# Patient Record
Sex: MALE | Race: WHITE | NOT HISPANIC OR LATINO | Employment: FULL TIME | URBAN - METROPOLITAN AREA
[De-identification: names, ages, dates, MRNs, and addresses within clinical notes are randomized per-mention and may not be internally consistent; named-entity substitution may affect disease eponyms.]

---

## 2018-03-08 ENCOUNTER — TELEPHONE (OUTPATIENT)
Dept: UROLOGY | Facility: MEDICAL CENTER | Age: 43
End: 2018-03-08

## 2018-03-08 NOTE — TELEPHONE ENCOUNTER
Phone call from Jazlyn Mensah @ [address] [facility]  Patient has a new patient appointment at their office at 1:30PM today for Gross hematuria  Past patient of dr Yamileth Lake  I advised Jazlyn Mensah, signed release needed  She will fax the release to my direct fax# as soon as patient arrives  Once Release is received I will fax the records to their direct Fax# [phone]

## 2018-03-09 NOTE — TELEPHONE ENCOUNTER
SIGNED RECORDS REQUEST RECEIVED   RECORDS FAXED TO [facility] [phone]

## 2019-08-19 ENCOUNTER — HOSPITAL ENCOUNTER (EMERGENCY)
Facility: HOSPITAL | Age: 44
Discharge: HOME | End: 2019-08-19
Attending: EMERGENCY MEDICINE | Admitting: EMERGENCY MEDICINE
Payer: COMMERCIAL

## 2019-08-19 VITALS
TEMPERATURE: 97.8 F | RESPIRATION RATE: 20 BRPM | BODY MASS INDEX: 35.87 KG/M2 | DIASTOLIC BLOOD PRESSURE: 91 MMHG | HEART RATE: 68 BPM | WEIGHT: 310 LBS | OXYGEN SATURATION: 98 % | HEIGHT: 78 IN | SYSTOLIC BLOOD PRESSURE: 136 MMHG

## 2019-08-19 DIAGNOSIS — S61.216A LACERATION OF RIGHT LITTLE FINGER WITHOUT FOREIGN BODY WITHOUT DAMAGE TO NAIL, INITIAL ENCOUNTER: Primary | ICD-10-CM

## 2019-08-19 PROCEDURE — 90715 TDAP VACCINE 7 YRS/> IM: CPT

## 2019-08-19 PROCEDURE — 99282 EMERGENCY DEPT VISIT SF MDM: CPT

## 2019-08-19 PROCEDURE — 90471 IMMUNIZATION ADMIN: CPT

## 2019-08-19 RX ORDER — LIDOCAINE HYDROCHLORIDE 20 MG/ML
5 INJECTION, SOLUTION EPIDURAL; INFILTRATION; INTRACAUDAL; PERINEURAL ONCE
Status: COMPLETED | OUTPATIENT
Start: 2019-08-19 | End: 2019-08-19

## 2019-08-19 RX ORDER — PANTOPRAZOLE SODIUM 20 MG/1
20 TABLET, DELAYED RELEASE ORAL DAILY
COMMUNITY

## 2019-08-19 RX ORDER — GINSENG 100 MG
1 CAPSULE ORAL ONCE
Status: COMPLETED | OUTPATIENT
Start: 2019-08-19 | End: 2019-08-19

## 2019-08-19 RX ADMIN — BACITRACIN ZINC 1 SMALL APPLICATION: 500 OINTMENT TOPICAL at 19:15

## 2019-08-19 RX ADMIN — LIDOCAINE HYDROCHLORIDE 5 ML: 20 INJECTION, SOLUTION EPIDURAL; INFILTRATION; INTRACAUDAL; PERINEURAL at 18:20

## 2019-08-19 RX ADMIN — TETANUS TOXOID, REDUCED DIPHTHERIA TOXOID AND ACELLULAR PERTUSSIS VACCINE, ADSORBED 0.5 ML: 5; 2.5; 8; 8; 2.5 SUSPENSION INTRAMUSCULAR at 18:17

## 2019-08-19 NOTE — ED PROVIDER NOTES
History  Chief Complaint   Patient presents with    Laceration     right 5th finger on glass     Patient broke a coffee mug which caused a laceration to his right little finger  Patient states that bled extensively, pain is minimal   Last Tdap is unknown  Patient denies other problems  History provided by:  Patient  Finger Laceration   Location:  Finger  Finger laceration location:  R little finger  Length:  2  Depth: Through dermis  Quality: avulsion    Bleeding: controlled    Laceration mechanism:  Broken glass  Pain details:     Severity:  Mild  Relieved by:  Nothing  Worsened by:  Nothing  Ineffective treatments:  None tried  Tetanus status:  Unknown  Associated symptoms: no fever, no focal weakness, no numbness, no rash, no redness and no swelling        Prior to Admission Medications   Prescriptions Last Dose Informant Patient Reported? Taking? pantoprazole (PROTONIX) 20 mg tablet   Yes Yes   Sig: Take 20 mg by mouth daily      Facility-Administered Medications: None       Past Medical History:   Diagnosis Date    Testicular cancer [facility])        Past Surgical History:   Procedure Laterality Date    HERNIA REPAIR      LYMPHADENECTOMY      SURGERY SCROTAL / TESTICULAR         History reviewed  No pertinent family history  I have reviewed and agree with the history as documented  Social History     Tobacco Use    Smoking status: Never Smoker    Smokeless tobacco: Never Used   Substance Use Topics    Alcohol use: Not Currently    Drug use: Never        Review of Systems   Constitutional: Negative for chills and fever  HENT: Negative for congestion, rhinorrhea and sore throat  Eyes: Negative for visual disturbance  Respiratory: Negative for cough and shortness of breath  Cardiovascular: Negative for chest pain  Gastrointestinal: Negative for abdominal pain, diarrhea, nausea and vomiting  Genitourinary: Negative for difficulty urinating  Skin: Negative for rash     Neurological: Negative for focal weakness and headaches  Physical Exam  Physical Exam   Constitutional: He is oriented to person, place, and time  He appears well-developed and well-nourished  HENT:   Head: Normocephalic  Right Ear: External ear normal    Left Ear: External ear normal    Nose: Nose normal    Mouth/Throat: Oropharynx is clear and moist    Eyes: Pupils are equal, round, and reactive to light  Conjunctivae and EOM are normal    Neck: Normal range of motion  Neck supple  Cardiovascular: Normal rate, regular rhythm, normal heart sounds and intact distal pulses  Pulmonary/Chest: Effort normal and breath sounds normal    Musculoskeletal: Normal range of motion  Neurological: He is alert and oriented to person, place, and time  Skin: Skin is warm and dry  Capillary refill takes less than 2 seconds  2 cm flap laceration over right little finger PIP joint dorsally   Psychiatric: He has a normal mood and affect  His behavior is normal  Thought content normal    Nursing note and vitals reviewed        Vital Signs  ED Triage Vitals [08/19/19 1728]   Temperature Pulse Respirations Blood Pressure SpO2   97 8 °F (36 6 °C) 82 16 142/83 97 %      Temp Source Heart Rate Source Patient Position - Orthostatic VS BP Location FiO2 (%)   Temporal Monitor Sitting Left arm --      Pain Score       6           Vitals:    08/19/19 1728   BP: 142/83   Pulse: 82   Patient Position - Orthostatic VS: Sitting         Visual Acuity      ED Medications  Medications   bacitracin topical ointment 1 small application (has no administration in time range)   tetanus-diphtheria-acellular pertussis (BOOSTRIX) IM injection 0 5 mL (0 5 mL Intramuscular Given 8/19/19 1817)   lidocaine (PF) (XYLOCAINE-MPF) 2 % injection 5 mL (5 mL Infiltration Given 8/19/19 1820)       Diagnostic Studies  Results Reviewed     None                 No orders to display              Procedures  Laceration repair  Date/Time: 8/19/2019 6:59 PM  Performed by: Renata Moreau PA-C  Authorized by: Renata Moreau PA-C   Consent: Verbal consent obtained  Risks and benefits: risks, benefits and alternatives were discussed  Consent given by: patient  Patient understanding: patient states understanding of the procedure being performed  Radiology Images displayed and confirmed  If images not available, report reviewed: imaging studies available  Patient identity confirmed: verbally with patient  Body area: upper extremity  Location details: right small finger  Laceration length: 3 cm  Tendon involvement: none  Nerve involvement: none  Vascular damage: yes (arteriole was lacerated, distal circulation intact)  Anesthesia: digital block    Anesthesia:  Local Anesthetic: lidocaine 2% without epinephrine  Anesthetic total: 5 mL    Wound Dehiscence:  Superficial Wound Dehiscence: simple closure      Procedure Details:  Preparation: Patient was prepped and draped in the usual sterile fashion    Irrigation solution: saline  Irrigation method: syringe  Amount of cleaning: standard  Skin closure: 5-0 nylon and Ethilon  Number of sutures: 6  Technique: simple  Approximation: close  Approximation difficulty: simple  Dressing: 4x4 sterile gauze, antibiotic ointment and splint  Patient tolerance: Patient tolerated the procedure well with no immediate complications             ED Course                               MDM    Disposition  Final diagnoses:   Laceration of right little finger without foreign body without damage to nail, initial encounter     Time reflects when diagnosis was documented in both MDM as applicable and the Disposition within this note     Time User Action Codes Description Comment    8/19/2019  7:01 PM Hayes Esters Add [Y25 361Y] Laceration of right little finger without foreign body without damage to nail, initial encounter       ED Disposition     ED Disposition Condition Date/Time Comment    Discharge Stable Mon Aug 19, 2019  7:01 PM Ute Pineda discharge to home/self care  Follow-up Information     Follow up With Specialties Details Why Contact Info    Brandy Brewster DO Family Medicine Schedule an appointment as soon as possible for a visit in 10 days For suture removal [address]  [phone]            Patient's Medications   Discharge Prescriptions    No medications on file     No discharge procedures on file      ED Provider  Electronically Signed by           Patrick Thompson PA-C  08/19/19 3924

## 2020-08-04 ENCOUNTER — HOSPITAL ENCOUNTER (INPATIENT)
Facility: HOSPITAL | Age: 45
LOS: 1 days | Discharge: HOME | DRG: 558 | End: 2020-08-06
Attending: EMERGENCY MEDICINE | Admitting: HOSPITALIST
Payer: COMMERCIAL

## 2020-08-04 ENCOUNTER — APPOINTMENT (EMERGENCY)
Dept: RADIOLOGY | Facility: HOSPITAL | Age: 45
DRG: 558 | End: 2020-08-04
Payer: COMMERCIAL

## 2020-08-04 DIAGNOSIS — M70.20 OLECRANON BURSITIS: Primary | ICD-10-CM

## 2020-08-04 DIAGNOSIS — M71.10 SEPTIC BURSITIS: ICD-10-CM

## 2020-08-04 DIAGNOSIS — M70.22 OLECRANON BURSITIS OF LEFT ELBOW: ICD-10-CM

## 2020-08-04 PROBLEM — E78.2 MIXED HYPERLIPIDEMIA: Status: ACTIVE | Noted: 2020-08-04

## 2020-08-04 PROBLEM — K21.9 GASTROESOPHAGEAL REFLUX DISEASE WITHOUT ESOPHAGITIS: Status: ACTIVE | Noted: 2020-08-04

## 2020-08-04 PROBLEM — I10 ESSENTIAL HYPERTENSION: Status: ACTIVE | Noted: 2020-08-04

## 2020-08-04 LAB
ALBUMIN SERPL BCP-MCNC: 4.1 G/DL (ref 3.5–5.7)
ALP SERPL-CCNC: 43 U/L (ref 40–150)
ALT SERPL W P-5'-P-CCNC: 16 U/L (ref 7–52)
ANION GAP SERPL CALCULATED.3IONS-SCNC: 7 MMOL/L (ref 4–13)
AST SERPL W P-5'-P-CCNC: 15 U/L (ref 13–39)
BASOPHILS # BLD AUTO: 0 THOUSANDS/ΜL (ref 0–0.1)
BASOPHILS NFR BLD AUTO: 0 % (ref 0–2)
BILIRUB SERPL-MCNC: 0.7 MG/DL (ref 0.2–1)
BUN SERPL-MCNC: 17 MG/DL (ref 7–25)
CALCIUM SERPL-MCNC: 9 MG/DL (ref 8.6–10.5)
CHLORIDE SERPL-SCNC: 100 MMOL/L (ref 98–107)
CO2 SERPL-SCNC: 26 MMOL/L (ref 21–31)
CREAT SERPL-MCNC: 0.87 MG/DL (ref 0.7–1.3)
CRP SERPL QL: 28.3 MG/L
EOSINOPHIL # BLD AUTO: 0 THOUSAND/ΜL (ref 0–0.61)
EOSINOPHIL NFR BLD AUTO: 0 % (ref 0–5)
ERYTHROCYTE [DISTWIDTH] IN BLOOD BY AUTOMATED COUNT: 12.7 % (ref 11.5–14.5)
GFR SERPL CREATININE-BSD FRML MDRD: 104 ML/MIN/1.73SQ M
GLUCOSE SERPL-MCNC: 114 MG/DL (ref 65–99)
HCT VFR BLD AUTO: 36 % (ref 42–47)
HGB BLD-MCNC: 12.5 G/DL (ref 14–18)
LACTATE SERPL-SCNC: 0.8 MMOL/L (ref 0.5–2)
LYMPHOCYTES # BLD AUTO: 1.4 THOUSANDS/ΜL (ref 0.6–4.47)
LYMPHOCYTES NFR BLD AUTO: 10 % (ref 21–51)
MCH RBC QN AUTO: 29.2 PG (ref 26–34)
MCHC RBC AUTO-ENTMCNC: 34.6 G/DL (ref 31–37)
MCV RBC AUTO: 85 FL (ref 81–99)
MONOCYTES # BLD AUTO: 0.9 THOUSAND/ΜL (ref 0.17–1.22)
MONOCYTES NFR BLD AUTO: 7 % (ref 2–12)
NEUTROPHILS # BLD AUTO: 11 THOUSANDS/ΜL (ref 1.4–6.5)
NEUTS SEG NFR BLD AUTO: 82 % (ref 42–75)
PLATELET # BLD AUTO: 202 THOUSANDS/UL (ref 149–390)
PMV BLD AUTO: 7.9 FL (ref 8.6–11.7)
POTASSIUM SERPL-SCNC: 3.5 MMOL/L (ref 3.5–5.5)
PROT SERPL-MCNC: 6.6 G/DL (ref 6.4–8.9)
RBC # BLD AUTO: 4.26 MILLION/UL (ref 4.3–5.9)
SODIUM SERPL-SCNC: 133 MMOL/L (ref 134–143)
WBC # BLD AUTO: 13.3 THOUSAND/UL (ref 4.8–10.8)

## 2020-08-04 PROCEDURE — 99220 PR INITIAL OBSERVATION CARE/DAY 70 MINUTES: CPT | Performed by: NURSE PRACTITIONER

## 2020-08-04 PROCEDURE — 80053 COMPREHEN METABOLIC PANEL: CPT | Performed by: EMERGENCY MEDICINE

## 2020-08-04 PROCEDURE — 83605 ASSAY OF LACTIC ACID: CPT | Performed by: EMERGENCY MEDICINE

## 2020-08-04 PROCEDURE — 85025 COMPLETE CBC W/AUTO DIFF WBC: CPT | Performed by: EMERGENCY MEDICINE

## 2020-08-04 PROCEDURE — 96365 THER/PROPH/DIAG IV INF INIT: CPT

## 2020-08-04 PROCEDURE — 99284 EMERGENCY DEPT VISIT MOD MDM: CPT

## 2020-08-04 PROCEDURE — 86140 C-REACTIVE PROTEIN: CPT | Performed by: EMERGENCY MEDICINE

## 2020-08-04 PROCEDURE — 87040 BLOOD CULTURE FOR BACTERIA: CPT | Performed by: EMERGENCY MEDICINE

## 2020-08-04 PROCEDURE — 96361 HYDRATE IV INFUSION ADD-ON: CPT

## 2020-08-04 PROCEDURE — 99285 EMERGENCY DEPT VISIT HI MDM: CPT | Performed by: EMERGENCY MEDICINE

## 2020-08-04 PROCEDURE — 36415 COLL VENOUS BLD VENIPUNCTURE: CPT | Performed by: EMERGENCY MEDICINE

## 2020-08-04 PROCEDURE — 73080 X-RAY EXAM OF ELBOW: CPT

## 2020-08-04 RX ORDER — LISINOPRIL 10 MG/1
40 TABLET ORAL
COMMUNITY

## 2020-08-04 RX ORDER — ACETAMINOPHEN 325 MG/1
650 TABLET ORAL ONCE
Status: COMPLETED | OUTPATIENT
Start: 2020-08-04 | End: 2020-08-04

## 2020-08-04 RX ORDER — ATORVASTATIN CALCIUM 80 MG/1
TABLET, FILM COATED ORAL
COMMUNITY

## 2020-08-04 RX ORDER — ALBUTEROL SULFATE 90 UG/1
2 AEROSOL, METERED RESPIRATORY (INHALATION) EVERY 6 HOURS PRN
COMMUNITY
Start: 2019-11-08 | End: 2020-11-07

## 2020-08-04 RX ADMIN — VANCOMYCIN HYDROCHLORIDE 2000 MG: 1 INJECTION, POWDER, LYOPHILIZED, FOR SOLUTION INTRAVENOUS at 22:58

## 2020-08-04 RX ADMIN — SODIUM CHLORIDE 1000 ML: 0.9 INJECTION, SOLUTION INTRAVENOUS at 21:07

## 2020-08-04 RX ADMIN — ACETAMINOPHEN 650 MG: 325 TABLET ORAL at 21:07

## 2020-08-05 PROBLEM — K21.9 GASTROESOPHAGEAL REFLUX DISEASE WITHOUT ESOPHAGITIS: Status: RESOLVED | Noted: 2020-08-04 | Resolved: 2020-08-05

## 2020-08-05 LAB
ANION GAP SERPL CALCULATED.3IONS-SCNC: 9 MMOL/L (ref 4–13)
BASOPHILS # BLD AUTO: 0 THOUSANDS/ΜL (ref 0–0.1)
BASOPHILS NFR BLD AUTO: 0 % (ref 0–2)
BUN SERPL-MCNC: 15 MG/DL (ref 7–25)
CALCIUM SERPL-MCNC: 8.5 MG/DL (ref 8.6–10.5)
CHLORIDE SERPL-SCNC: 103 MMOL/L (ref 98–107)
CO2 SERPL-SCNC: 25 MMOL/L (ref 21–31)
CREAT SERPL-MCNC: 0.74 MG/DL (ref 0.7–1.3)
EOSINOPHIL # BLD AUTO: 0 THOUSAND/ΜL (ref 0–0.61)
EOSINOPHIL NFR BLD AUTO: 0 % (ref 0–5)
ERYTHROCYTE [DISTWIDTH] IN BLOOD BY AUTOMATED COUNT: 13 % (ref 11.5–14.5)
GFR SERPL CREATININE-BSD FRML MDRD: 111 ML/MIN/1.73SQ M
GLUCOSE P FAST SERPL-MCNC: 100 MG/DL (ref 65–99)
GLUCOSE SERPL-MCNC: 100 MG/DL (ref 65–99)
HCT VFR BLD AUTO: 35.5 % (ref 42–47)
HGB BLD-MCNC: 12.5 G/DL (ref 14–18)
LYMPHOCYTES # BLD AUTO: 1.1 THOUSANDS/ΜL (ref 0.6–4.47)
LYMPHOCYTES NFR BLD AUTO: 8 % (ref 21–51)
MCH RBC QN AUTO: 29.7 PG (ref 26–34)
MCHC RBC AUTO-ENTMCNC: 35.2 G/DL (ref 31–37)
MCV RBC AUTO: 84 FL (ref 81–99)
MONOCYTES # BLD AUTO: 1.1 THOUSAND/ΜL (ref 0.17–1.22)
MONOCYTES NFR BLD AUTO: 9 % (ref 2–12)
NEUTROPHILS # BLD AUTO: 10.7 THOUSANDS/ΜL (ref 1.4–6.5)
NEUTS SEG NFR BLD AUTO: 83 % (ref 42–75)
PLATELET # BLD AUTO: 185 THOUSANDS/UL (ref 149–390)
PLATELET # BLD AUTO: 187 THOUSANDS/UL (ref 149–390)
PMV BLD AUTO: 8.4 FL (ref 8.6–11.7)
POTASSIUM SERPL-SCNC: 3.3 MMOL/L (ref 3.5–5.5)
PROCALCITONIN SERPL-MCNC: <0.05 NG/ML
RBC # BLD AUTO: 4.21 MILLION/UL (ref 4.3–5.9)
SODIUM SERPL-SCNC: 137 MMOL/L (ref 134–143)
VANCOMYCIN TROUGH SERPL-MCNC: 11 UG/ML (ref 5–12)
WBC # BLD AUTO: 12.9 THOUSAND/UL (ref 4.8–10.8)

## 2020-08-05 PROCEDURE — 80048 BASIC METABOLIC PNL TOTAL CA: CPT | Performed by: NURSE PRACTITIONER

## 2020-08-05 PROCEDURE — 99204 OFFICE O/P NEW MOD 45 MIN: CPT | Performed by: ORTHOPAEDIC SURGERY

## 2020-08-05 PROCEDURE — 99226 PR SBSQ OBSERVATION CARE/DAY 35 MINUTES: CPT | Performed by: HOSPITALIST

## 2020-08-05 PROCEDURE — 80202 ASSAY OF VANCOMYCIN: CPT | Performed by: NURSE PRACTITIONER

## 2020-08-05 PROCEDURE — 85025 COMPLETE CBC W/AUTO DIFF WBC: CPT | Performed by: NURSE PRACTITIONER

## 2020-08-05 PROCEDURE — 85049 AUTOMATED PLATELET COUNT: CPT | Performed by: NURSE PRACTITIONER

## 2020-08-05 PROCEDURE — 84145 PROCALCITONIN (PCT): CPT | Performed by: HOSPITALIST

## 2020-08-05 RX ORDER — SODIUM CHLORIDE 9 MG/ML
100 INJECTION, SOLUTION INTRAVENOUS CONTINUOUS
Status: DISCONTINUED | OUTPATIENT
Start: 2020-08-05 | End: 2020-08-06

## 2020-08-05 RX ORDER — MELOXICAM 7.5 MG/1
15 TABLET ORAL DAILY
Status: DISCONTINUED | OUTPATIENT
Start: 2020-08-05 | End: 2020-08-06 | Stop reason: HOSPADM

## 2020-08-05 RX ORDER — CEFEPIME HYDROCHLORIDE 2 G/50ML
2000 INJECTION, SOLUTION INTRAVENOUS EVERY 12 HOURS
Status: DISCONTINUED | OUTPATIENT
Start: 2020-08-05 | End: 2020-08-06

## 2020-08-05 RX ORDER — LISINOPRIL 5 MG/1
10 TABLET ORAL DAILY
Status: DISCONTINUED | OUTPATIENT
Start: 2020-08-05 | End: 2020-08-06 | Stop reason: HOSPADM

## 2020-08-05 RX ORDER — VANCOMYCIN HYDROCHLORIDE 1 G/200ML
1000 INJECTION, SOLUTION INTRAVENOUS EVERY 12 HOURS
Status: DISCONTINUED | OUTPATIENT
Start: 2020-08-05 | End: 2020-08-05 | Stop reason: DRUGHIGH

## 2020-08-05 RX ORDER — CEFEPIME HYDROCHLORIDE 2 G/1
2000 INJECTION, POWDER, FOR SOLUTION INTRAVENOUS EVERY 12 HOURS
Status: DISCONTINUED | OUTPATIENT
Start: 2020-08-05 | End: 2020-08-05

## 2020-08-05 RX ORDER — HYDROMORPHONE HCL 110MG/55ML
2 PATIENT CONTROLLED ANALGESIA SYRINGE INTRAVENOUS ONCE
Status: COMPLETED | OUTPATIENT
Start: 2020-08-05 | End: 2020-08-05

## 2020-08-05 RX ORDER — ONDANSETRON 2 MG/ML
4 INJECTION INTRAMUSCULAR; INTRAVENOUS EVERY 6 HOURS PRN
Status: DISCONTINUED | OUTPATIENT
Start: 2020-08-05 | End: 2020-08-06 | Stop reason: HOSPADM

## 2020-08-05 RX ORDER — ACETAMINOPHEN 325 MG/1
650 TABLET ORAL EVERY 6 HOURS PRN
Status: DISCONTINUED | OUTPATIENT
Start: 2020-08-05 | End: 2020-08-06 | Stop reason: HOSPADM

## 2020-08-05 RX ORDER — FAMOTIDINE 20 MG/1
40 TABLET, FILM COATED ORAL 2 TIMES DAILY
Status: DISCONTINUED | OUTPATIENT
Start: 2020-08-05 | End: 2020-08-05

## 2020-08-05 RX ORDER — ATORVASTATIN CALCIUM 80 MG/1
80 TABLET, FILM COATED ORAL
Status: DISCONTINUED | OUTPATIENT
Start: 2020-08-05 | End: 2020-08-06 | Stop reason: HOSPADM

## 2020-08-05 RX ORDER — ALBUTEROL SULFATE 90 UG/1
2 AEROSOL, METERED RESPIRATORY (INHALATION) EVERY 6 HOURS PRN
Status: DISCONTINUED | OUTPATIENT
Start: 2020-08-05 | End: 2020-08-06 | Stop reason: HOSPADM

## 2020-08-05 RX ADMIN — MORPHINE SULFATE 2 MG: 2 INJECTION, SOLUTION INTRAMUSCULAR; INTRAVENOUS at 13:44

## 2020-08-05 RX ADMIN — MORPHINE SULFATE 2 MG: 2 INJECTION, SOLUTION INTRAMUSCULAR; INTRAVENOUS at 09:45

## 2020-08-05 RX ADMIN — LISINOPRIL 10 MG: 5 TABLET ORAL at 09:50

## 2020-08-05 RX ADMIN — HYDROMORPHONE HYDROCHLORIDE 2 MG: 2 INJECTION, SOLUTION INTRAMUSCULAR; INTRAVENOUS; SUBCUTANEOUS at 04:50

## 2020-08-05 RX ADMIN — ATORVASTATIN CALCIUM 80 MG: 80 TABLET, FILM COATED ORAL at 15:44

## 2020-08-05 RX ADMIN — VANCOMYCIN HYDROCHLORIDE 1750 MG: 1 INJECTION, POWDER, LYOPHILIZED, FOR SOLUTION INTRAVENOUS at 15:43

## 2020-08-05 RX ADMIN — VANCOMYCIN HYDROCHLORIDE 1750 MG: 1 INJECTION, POWDER, LYOPHILIZED, FOR SOLUTION INTRAVENOUS at 09:53

## 2020-08-05 RX ADMIN — CEFEPIME HYDROCHLORIDE 2000 MG: 2 INJECTION, SOLUTION INTRAVENOUS at 11:55

## 2020-08-05 RX ADMIN — MELOXICAM 15 MG: 7.5 TABLET ORAL at 15:43

## 2020-08-05 RX ADMIN — SODIUM CHLORIDE 100 ML/HR: 0.9 INJECTION, SOLUTION INTRAVENOUS at 02:00

## 2020-08-05 RX ADMIN — CEFEPIME HYDROCHLORIDE 2000 MG: 2 INJECTION, SOLUTION INTRAVENOUS at 22:03

## 2020-08-05 RX ADMIN — MORPHINE SULFATE 2 MG: 2 INJECTION, SOLUTION INTRAMUSCULAR; INTRAVENOUS at 01:57

## 2020-08-05 RX ADMIN — SODIUM CHLORIDE 100 ML/HR: 0.9 INJECTION, SOLUTION INTRAVENOUS at 11:54

## 2020-08-05 RX ADMIN — ENOXAPARIN SODIUM 40 MG: 40 INJECTION SUBCUTANEOUS at 09:50

## 2020-08-05 RX ADMIN — MORPHINE SULFATE 2 MG: 2 INJECTION, SOLUTION INTRAMUSCULAR; INTRAVENOUS at 19:44

## 2020-08-05 NOTE — ASSESSMENT & PLAN NOTE
· Patient continues to complain of excruciating pain in his left elbow, patient cannot flex or extend his elbow - patient is requiring IV narcotics for pain control  · Await formal orthopedic input  · C reactive protein is elevated  · Will check a procalcitonin now, and again in the a m  · Continue vancomycin, will add cefepime  · I will be transitioning this patient from the observation status to the inpatient status in view of the continued need of IV antibiotics, IV narcotics, and an orthopedic evaluation    I do not anticipate that he will be discharged prior to the initial suspected less than 2 midnights  · Will repeat blood work for the morning

## 2020-08-05 NOTE — PROGRESS NOTES
Vancomycin Assessment    Gavino Bob is a 38 y o  male who is currently receiving vancomycin 2000mg IV once, then 1000mg IV Q12H for other bone/joint infection   Relevant clinical data and objective history reviewed:  Creatinine   Date Value Ref Range Status   08/04/2020 0 87 0 70 - 1 30 mg/dL Final     Comment:     Standardized to IDMS reference method     /83 (BP Location: Right arm)   Pulse 80   Temp 98 7 °F (37 1 °C) (Temporal)   Resp 20   Ht 6' 9"   Wt (!) 138 kg (305 lb 0 1 oz)   SpO2 99%   BMI 32 68 kg/m²   No intake/output data recorded  Lab Results   Component Value Date/Time    BUN 17 08/04/2020 09:06 PM    WBC 13 30 (H) 08/04/2020 09:06 PM    HGB 12 5 (L) 08/04/2020 09:06 PM    HCT 36 0 (L) 08/04/2020 09:06 PM    MCV 85 08/04/2020 09:06 PM     08/04/2020 09:06 PM     Temp Readings from Last 3 Encounters:   08/05/20 98 7 °F (37 1 °C) (Temporal)   08/19/19 97 8 °F (36 6 °C) (Temporal)     Vancomycin Days of Therapy: 1    Assessment/Plan  The patient is currently on vancomycin utilizing scheduled dosing based on adjusted body weight (due to obesity)  Baseline risks associated with therapy include: concomitant nephrotoxic medications  The patient is currently receiving 2000mg IV once, then 1000mg IV Q12H and after clinical evaluation will be changed to 2000mg IV once, then 1750mg IV Q8H  Pharmacy will also follow closely for s/sx of nephrotoxicity, infusion reactions, and appropriateness of therapy  BMP and CBC will be ordered per protocol  Plan for trough as patient approaches steady state, prior to the 4th  dose at approximately 2230 on 8/5/20  Due to infection severity, will target a trough of 15-20 (appropriate for most indications)   Pharmacy will continue to follow the patients culture results and clinical progress daily      Tonya Collet, Pharmacist

## 2020-08-05 NOTE — NURSING NOTE
Patient awake  Alert and oriented, pleasant and cooperative with care  Dr Méndez Credit in to assess patient  Moist heat reapplied to left elbow as Dr Méndez Credit requests  Left arm elevated on 2 pillows  Neurovascular status stable

## 2020-08-05 NOTE — QUICK NOTE
Patient did receive morphine 2 mg IV at 1:57 a m  For left elbow pain  RN notified provider at this time, of continued pain  Will order patient 1 time dose of 2 mg IV Dilaudid, continue to monitor patient

## 2020-08-05 NOTE — CONSULTS
Consultation - Orthopedics   Johana Lobato 38 y o  male MRN: [id number]  Unit/Bed#: -01 Encounter: [id number]      Assessment/Plan     Assessment:  Cellulitis left upper extremity slowly resolving  Plan:  Continue intravenous antibiotics for an additional 24-48 hours  Patient states redness is starting to decrease  Would recommend warm moist compresses  Will add anti-inflammatory medication   to be evaluated tomorrow    History of Present Illness   Physician Requesting Consult: Mayra Yates MD  Reason for Consult / Principal Problem:  Cellulitis left upper extremity  HPI: Johana Lobato is a 38y o  year old male who presents with left elbow pain for the past day  He works as a   He denies any specific trauma  He denies any numbness or tingling  He a questionable for chills last night  He noticed erythema along his elbow and forearm region  He did come to the emergency room where he was evaluated and subsequently admitted  He states he still some pain, albeit he states the redness is starting to decrease  Inpatient consult to Orthopedic Surgery  Consult performed by: Ruchi Mayorga DO  Consult ordered by: ALEX Akhtar          Review of Systems   Constitutional: Negative for chills, fever and unexpected weight change  HENT: Negative for hearing loss, nosebleeds and sore throat  Eyes: Negative for pain, redness and visual disturbance  Respiratory: Negative for cough, shortness of breath and wheezing  Cardiovascular: Negative for chest pain, palpitations and leg swelling  Gastrointestinal: Negative for abdominal pain, nausea and vomiting  Endocrine: Negative for polydipsia and polyuria  Genitourinary: Negative for dysuria and hematuria  Musculoskeletal: Positive for arthralgias, joint swelling and myalgias  Negative for back pain, gait problem, neck pain and neck stiffness  As noted in HPI   Skin: Negative for rash and wound     Neurological: Negative for dizziness, numbness and headaches  Psychiatric/Behavioral: Negative for decreased concentration and suicidal ideas  The patient is not nervous/anxious             Historical Information   Past Medical History:   Diagnosis Date    Asthma     Hypertension     Testicular cancer ([address] )      Past Surgical History:   Procedure Laterality Date    HERNIA REPAIR      LYMPHADENECTOMY      ROTATOR CUFF REPAIR      SURGERY SCROTAL / TESTICULAR       Social History   Social History     Substance and Sexual Activity   Alcohol Use Yes    Frequency: Monthly or less    Drinks per session: 1 or 2    Binge frequency: Never    Comment: few beers a month     Social History     Substance and Sexual Activity   Drug Use Never     E-Cigarette/Vaping    E-Cigarette Use Never User      E-Cigarette/Vaping Substances    Nicotine No     THC No     CBD No     Flavoring No     Other No     Unknown No      Social History     Tobacco Use   Smoking Status Former Smoker    Packs/day: 0 50    Years: 1 00    Pack years: 0 50    Types: Cigarettes    Start date: 1992    Last attempt to quit: 10/5/1993    Years since quittin 8   Smokeless Tobacco Never Used     Family History:   Family History   Problem Relation Age of Onset    Cancer Mother     Hypertension Father     Thyroid disease Sister     No Known Problems Daughter        Meds/Allergies   all current active meds have been reviewed  Allergies   Allergen Reactions    Ibuprofen Other (See Comments) and GI Bleeding     Other reaction(s): severe chest pain  800mg  800mg  Other reaction(s): Free Text  800mg    Cat Hair Extract Wheezing    Grass Extracts [Gramineae Pollens] Headache     Eyes water    Lidocaine Hives     rash    Pollen Extract Sneezing    Bee Pollen Rash    Dust Mite Extract Sneezing    Mold Extract [Trichophyton] Sneezing       Objective   Vitals: Blood pressure 137/72, pulse 96, temperature 99 2 °F (37 3 °C), temperature source Tympanic, resp  rate 20, height 6' 9" (2 057 m), weight (!) 138 kg (305 lb 0 1 oz), SpO2 96 %  ,Body mass index is 32 68 kg/m²  Intake/Output Summary (Last 24 hours) at 8/5/2020 1222  Last data filed at 8/5/2020 0953  Gross per 24 hour   Intake 360 ml   Output    Net 360 ml     I/O last 24 hours: In: 360 [P O :360]  Out: -     Invasive Devices     Peripheral Intravenous Line            Peripheral IV 08/04/20 Right Antecubital less than 1 day                Physical Exam  Ortho Exam     Physical Exam   Constitutional: He is oriented to person, place, and time  He appears well-developed and well-nourished  No distress  HENT:   Head: Normocephalic  Eyes: Conjunctivae are normal  Right eye exhibits no discharge  Left eye exhibits no discharge  No scleral icterus  Cardiovascular: Normal rate  Pulmonary/Chest: Effort normal    Neurological: He is alert and oriented to person, place, and time  Skin: Skin is warm and dry  No rash noted  He is not diaphoretic  No pallor  Psychiatric: He has a normal mood and affect  His behavior is normal  Judgment and thought content normal      Skeletal was the left upper extremity is neurovascular intact  Fingers are pink and mobile  Compartments are soft  There is erythema along the posterior aspect of the elbow  There is no fullness or fluid  The skin appears to be cellulitic in nature  There is no open wounds drainage pus or foul smell  There is decreased range of motion along the elbow secondary to pain but functionally intact    No red streaks present    Lab Results:   CBC:   Lab Results   Component Value Date    WBC 12 90 (H) 08/05/2020    HGB 12 5 (L) 08/05/2020    HCT 35 5 (L) 08/05/2020    MCV 84 08/05/2020     08/05/2020    MCH 29 7 08/05/2020    MCHC 35 2 08/05/2020    RDW 13 0 08/05/2020    MPV 8 4 (L) 08/05/2020     CMP:   Lab Results   Component Value Date    SODIUM 137 08/05/2020     08/05/2020    CO2 25 08/05/2020    BUN 15 08/05/2020 CREATININE 0 74 08/05/2020    CALCIUM 8 5 (L) 08/05/2020    AST 15 08/04/2020    ALT 16 08/04/2020    ALKPHOS 43 08/04/2020    EGFR 111 08/05/2020     PT/INR: No results found for: PT, INR  ESR: No results found for: ESR  Imaging Studies: I have personally reviewed pertinent films in PACS     X-rays personally reviewed displaced no fractures or dislocations  There is soft tissue swelling posteriorly    EKG, Pathology, and Other Studies: I have personally reviewed pertinent reports  VTE Prophylaxis: Sequential compression device (Venodyne)     Code Status: Level 1 - Full Code  Advance Directive and Living Will:      Power of :    POLST:      Counseling / Coordination of Care  Total floor / unit time spent today 35 minutes  Greater than 50% of total time was spent with the patient and / or family counseling and / or coordination of care   A description of the counseling / coordination of care:

## 2020-08-05 NOTE — PLAN OF CARE
Problem: INFECTION - ADULT  Goal: Absence or prevention of progression during hospitalization  Description: INTERVENTIONS:  - Assess and monitor for signs and symptoms of infection  - Monitor lab/diagnostic results  - [place] appropriate cooling/warming therapies per order  - Administer medications as ordered  - Instruct and encourage patient and family to use good hand hygiene technique  - Identify and instruct in appropriate isolation precautions for identified infection/condition  Outcome: Progressing     Problem: DISCHARGE PLANNING  Goal: Discharge to home or other facility with appropriate resources  Description: INTERVENTIONS:  - Identify barriers to discharge w/patient and caregiver  - Arrange for needed discharge resources and transportation as appropriate  - Identify discharge learning needs (meds, wound care, etc )  - Refer to Case Management Department for coordinating discharge planning if the patient needs post-hospital services based on physician/advanced practitioner order or complex needs related to functional status, cognitive ability, or social support system  Outcome: Progressing     Problem: SKIN/TISSUE INTEGRITY - ADULT  Goal: Skin integrity remains intact  Description: INTERVENTIONS  - Assess and monitor skin integrity  - Collaborate with interdisciplinary team   - Patient/family teaching  Outcome: Progressing

## 2020-08-05 NOTE — UTILIZATION REVIEW
Notification of Observation Admission/Observation Authorization Request   This is a Notification of Observation Admission for [facility]  Be advised that this patient was admitted to our facility under Observation Status  Contact Harriet Farrar at [phone] for additional admission information  [facility]  DEDICATED [phone]  Patient Name:   Prosper Pulido   YOB: 1975       [address]   [address]:   [address]  Tax ID: [id number]  NPI: [id number] Attending Provider/NPI:  Phone:  Address: Aileen Mora [[id number]]  [phone]  Same as Facility   Place of Service Code: 25     Place of Service Name:  CPT Code for Observation:  On [facility] / CPT 14025   Start Date:  Discharge Date & Time: No discharge date for patient encounter  Type of Admission: Observation Status Discharge Disposition (if discharged): Home/Self Care   Patient Diagnoses: Olecranon bursitis [M70 20]  Elbow swelling [M25 429]  Septic bursitis [M71 10]  Olecranon bursitis of left elbow [M70 22]   Orders: Admission Orders (From admission, onward)     Ordered        08/04/20 2319  Place in Observation (expected length of stay for this patient is less than two midnights)  Once                    Assigned Utilization Review Contact: Ronnie Barrier Review Department  Phone: [phone]; Fax [phone]  Email: [email or web address]   ATTENTION PAYERS: Please call the assigned Utilization  directly with any questions or concerns ALL voicemails in the department are confidential  Send all requests for admission clinical reviews, approved or denied determinations and any other requests to dedicated fax number belonging to the campus where the patient is receiving treatment

## 2020-08-05 NOTE — PROGRESS NOTES
Progress Note - Alvina Gramajo 1975, 38 y o  male MRN: [id number]    Unit/Bed#: -01 Encounter: [id number]    Primary Care Provider: Christofer Cali DO   Date and time admitted to hospital: 2020  8:39 PM        * Olecranon bursitis of left elbow  Assessment & Plan  · Patient continues to complain of excruciating pain in his left elbow, patient cannot flex or extend his elbow - patient is requiring IV narcotics for pain control  · Await formal orthopedic input  · C reactive protein is elevated  · Will check a procalcitonin now, and again in the a m  · Continue vancomycin, will add cefepime  · If, after the orthopedic evaluation, further inpatient workup is needed, I will transition this patient from the observation status to the inpatient status  · Will repeat blood work for the morning    Essential hypertension  Assessment & Plan  · Continue lisinopril    Mixed hyperlipidemia  Assessment & Plan  · Continue Lipitor        VTE Prophylaxis:  Enoxaparin (Lovenox)    Patient Centered Rounds: I have performed bedside rounds with nursing staff today  Discussions with Specialists or Other Care Team Provider:  Orthopedic surgery, case management, nursing, pharmacy  Education and Discussions with Family / Patient:  Patient was brought up to par with the plan of care for today, all questions answered to his satisfaction    Current Length of Stay: 0 day(s)    Current Patient Status: Observation   Certification Statement: The patient will continue to require additional inpatient hospital stay due to The impending orthopedic evaluation    Discharge Plan:  Discharge planning will commence after the patient has been cleared from an orthopedic standpoint    Code Status: Level 1 - Full Code    Subjective:   Patient seen and examined, patient continues to complain of a 10/10 left elbow pain  The pain is prohibiting him from flexing and or extending his elbow      Objective:     Vitals:   Temp (24hrs), Av 6 °F (37 6 °C), Min:98 7 °F (37 1 °C), Max:100 6 °F (38 1 °C)    Temp:  [98 7 °F (37 1 °C)-100 6 °F (38 1 °C)] 99 2 °F (37 3 °C)  HR:  [80-96] 96  Resp:  [18-20] 20  BP: (131-138)/(72-83) 137/72  SpO2:  [96 %-99 %] 96 %  Body mass index is 32 68 kg/m²  Input and Output Summary (last 24 hours):     No intake or output data in the 24 hours ending 08/05/20 1000    Physical Exam:     Physical Exam   Constitutional: He is oriented to person, place, and time  He appears well-developed  HENT:   Head: Normocephalic and atraumatic  Nose: Nose normal    Eyes: Pupils are equal, round, and reactive to light  Conjunctivae are normal    Neck: Normal range of motion  Neck supple  No JVD present  No thyromegaly present  Cardiovascular: Normal rate and regular rhythm  Exam reveals no gallop and no friction rub  No murmur heard  Pulmonary/Chest: Effort normal and breath sounds normal  No respiratory distress  Abdominal: Soft  Bowel sounds are normal  He exhibits no distension and no mass  There is no abdominal tenderness  There is no guarding  Musculoskeletal: Normal range of motion  Comments: Left elbow is extremely swollen, tender to touch, flexion and extension are not possible   Lymphadenopathy:     He has no cervical adenopathy  Neurological: He is alert and oriented to person, place, and time  No cranial nerve deficit  Skin: Skin is warm  No rash noted  No erythema  Psychiatric: His behavior is normal    Vitals reviewed        Additional Data:     Labs:    Results from last 7 days   Lab Units 08/05/20  0448   WBC Thousand/uL 12 90*   HEMOGLOBIN g/dL 12 5*   HEMATOCRIT % 35 5*   PLATELETS Thousands/uL 187   NEUTROS PCT % 83*   LYMPHS PCT % 8*   MONOS PCT % 9   EOS PCT % 0     Results from last 7 days   Lab Units 08/05/20  0448 08/04/20  2106   SODIUM mmol/L 137 133*   POTASSIUM mmol/L 3 3* 3 5   CHLORIDE mmol/L 103 100   CO2 mmol/L 25 26   BUN mg/dL 15 17   CREATININE mg/dL 0 74 0 87   CALCIUM mg/dL 8 5* 9  0   ALK PHOS U/L  --  43   ALT U/L  --  16   AST U/L  --  15                   * I Have Reviewed All Lab Data Listed Above  * Additional Pertinent Lab Tests Reviewed: Johnna 66 Admission  Reviewed    Imaging:  Imaging Reports Reviewed Today Include:  X-ray elbow-left olecranon bursitis    Recent Cultures (last 7 days):           Last 24 Hours Medication List:   acetaminophen, 650 mg, Oral, Q6H PRN, Radhika A Meckes, CRNP  albuterol, 2 puff, Inhalation, Q6H PRN, Radhika A Meckes, CRNP  atorvastatin, 80 mg, Oral, Daily With Dinner, Radhika A Meckes, CRNP  enoxaparin, 40 mg, Subcutaneous, Daily, Radhika A Meckes, CRNP  lisinopril, 10 mg, Oral, Daily, Radhika A Meckes, CRNP  morphine injection, 2 mg, Intravenous, Q4H PRN, Radhika A Meckes, CRNP  ondansetron, 4 mg, Intravenous, Q6H PRN, Radhika A Meckes, CRNP  sodium chloride, 100 mL/hr, Intravenous, Continuous, Radhika A Meckes, CRNP, Last Rate: 100 mL/hr (08/05/20 0200)  vancomycin, 15 mg/kg (Adjusted), Intravenous, Q8H, Radhika A Meckes, CRNP, Last Rate: 1,750 mg (08/05/20 0953)         Today, Patient Was Seen By: Tonia Barkley MD    ** Please Note: Dictation voice to text software may have been used in the creation of this document   **

## 2020-08-05 NOTE — ASSESSMENT & PLAN NOTE
· Sudden onset = septic  · Patient with elevated WBC, and temperature of 100 6°  · Dr Manny oBnilla made aware of orthopedic consultation by ED provider  · Approximately 24 hours of redness, increasing warmth, edema    · Continue vancomycin  · Will alternate heat and ice  · Morphine for pain control  · Continue IV fluids at 100 mL/hour

## 2020-08-05 NOTE — SOCIAL WORK
Evaluated the pt at the bedside  Pt was admitted to the hospital for olecranon bursitis of the elbow  Explained the role of CM and the options of discharge plannign with the pt  Pt lives with his wife in a 2 story home with 15 steps to the 2nd floor  Pt is independent and works  Pt PCP is Dr Kadie Gallego  Pt utilizes [employer]  Pt states his wife will transport home upon discharge  Patient/caregiver received discharge checklist   Content reviewed  Patient/caregiver encouraged to participate in discharge plan of care prior to discharge home  CM reviewed d/c planning process including the following: identifying help at home, patient preference for d/c planning needs, availability of treatment team to discuss questions or concerns patient and/or family may have regarding understanding medications and recognizing signs and symptoms once discharged  CM also encouraged patient to follow up with all recommended appointments after discharge  Patient advised of importance for patient and family to participate in managing patients medical well being

## 2020-08-05 NOTE — ED PROVIDER NOTES
History  Chief Complaint   Patient presents with    Elbow Swelling     HPI    Patient is a 38year old male who presents with left olecranon bursitis for the past day  Some overlying erythema  No vomiting or diarrhea  Currently well appearing  NAD  Notes sharp, pain in the left elbow, no radiation, worse with palpation  MDM olecranon bursitis will discuss plan with ortho  Of note, patient developed a fever while here, this coupled with the overlying erythema and chills, will admit  Prior to Admission Medications   Prescriptions Last Dose Informant Patient Reported? Taking? Cholecalciferol 1 25 MG (34645 UT) TABS 8/2/2020 at Unknown time  Yes Yes   Sig: take 1 capsule by mouth ONCE EVERY WEEK FOR 4 WEEKS THEN ONE CAPSULE ONCE EVERY MONTH   albuterol (PROVENTIL HFA,VENTOLIN HFA) 90 mcg/act inhaler Past Week at Unknown time  Yes Yes   Sig: Inhale 2 puffs every 6 (six) hours as needed   atorvastatin (LIPITOR) 80 mg tablet 8/4/2020 at Unknown time  Yes Yes   lisinopril (ZESTRIL) 10 mg tablet 8/4/2020 at Unknown time  Yes Yes   Sig: Take by mouth   pantoprazole (PROTONIX) 20 mg tablet Not Taking at Unknown time  Yes No   Sig: Take 20 mg by mouth daily      Facility-Administered Medications: None       Past Medical History:   Diagnosis Date    Asthma     Hypertension     [facility]icular cancer ([facility] [address] )        Past Surgical History:   Procedure Laterality Date    HERNIA REPAIR      LYMPHADENECTOMY      ROTATOR CUFF REPAIR      SURGERY SCROTAL / TESTICULAR         Family History   Problem Relation Age of Onset    Cancer Mother     Hypertension Father     Thyroid disease Sister     No Known Problems Daughter      I have reviewed and agree with the history as documented      E-Cigarette/Vaping    E-Cigarette Use Never User      E-Cigarette/Vaping Substances    Nicotine No     THC No     CBD No     Flavoring No     Other No     Unknown No      Social History     Tobacco Use    Smoking status: Former Smoker     Packs/day: 0 50     Years: 1 00     Pack years: 0 50     Types: Cigarettes     Start date: 1992     Last attempt to quit: 10/5/1993     Years since quittin 8    Smokeless tobacco: Never Used   Substance Use Topics    Alcohol use: Yes     Frequency: Monthly or less     Drinks per session: 1 or 2     Binge frequency: Never     Comment: few beers a month    Drug use: Never       Review of Systems   Musculoskeletal: Positive for arthralgias and joint swelling  All other systems reviewed and are negative  Physical Exam  Physical Exam  Vitals signs and nursing note reviewed  Constitutional:       Appearance: He is well-developed  He is not diaphoretic  HENT:      Head: Normocephalic and atraumatic  Eyes:      Pupils: Pupils are equal, round, and reactive to light  Neck:      Musculoskeletal: Normal range of motion and neck supple  Cardiovascular:      Rate and Rhythm: Normal rate and regular rhythm  Heart sounds: Normal heart sounds  No murmur  Pulmonary:      Effort: Pulmonary effort is normal  No respiratory distress  Breath sounds: Normal breath sounds  No wheezing  Abdominal:      General: Bowel sounds are normal  There is no distension  Palpations: Abdomen is soft  Tenderness: There is no abdominal tenderness  Musculoskeletal: Normal range of motion  General: Swelling and tenderness present  Skin:     General: Skin is warm and dry  Findings: Erythema present  Neurological:      Mental Status: He is alert and oriented to person, place, and time  Cranial Nerves: No cranial nerve deficit        Coordination: Coordination normal    Psychiatric:         Behavior: Behavior normal          Vital Signs  ED Triage Vitals [20]   Temperature Pulse Respirations Blood Pressure SpO2   99 9 °F (37 7 °C) 89 18 131/77 99 %      Temp Source Heart Rate Source Patient Position - Orthostatic VS BP Location FiO2 (%) Tympanic Monitor Sitting Left arm --      Pain Score       Worst Possible Pain           Vitals:    08/04/20 2043 08/05/20 0038 08/05/20 0656 08/05/20 1503   BP: 131/77 138/83 137/72 130/74   Pulse: 89 80 96 92   Patient Position - Orthostatic VS: Sitting Sitting Lying Lying         Visual Acuity      ED Medications  Medications   albuterol (PROVENTIL HFA,VENTOLIN HFA) inhaler 2 puff (has no administration in time range)   atorvastatin (LIPITOR) tablet 80 mg (80 mg Oral Given 8/5/20 1544)   lisinopril (ZESTRIL) tablet 10 mg (10 mg Oral Given 8/5/20 0950)   sodium chloride 0 9 % infusion (100 mL/hr Intravenous Rate/Dose Verify 8/5/20 1201)   ondansetron (ZOFRAN) injection 4 mg (has no administration in time range)   enoxaparin (LOVENOX) subcutaneous injection 40 mg (40 mg Subcutaneous Given 8/5/20 0950)   acetaminophen (TYLENOL) tablet 650 mg (has no administration in time range)   morphine injection 2 mg (2 mg Intravenous Given 8/5/20 1344)   vancomycin (VANCOCIN) 1,750 mg in sodium chloride 0 9 % 500 mL IVPB (1,750 mg Intravenous New Bag 8/5/20 1543)   cefepime (MAXIPIME) IVPB (premix) 2,000 mg 50 mL (0 mg Intravenous Stopped 8/5/20 1230)   meloxicam (MOBIC) tablet 15 mg (15 mg Oral Given 8/5/20 1543)   sodium chloride 0 9 % bolus 1,000 mL (0 mL Intravenous Stopped 8/4/20 2213)   acetaminophen (TYLENOL) tablet 650 mg (650 mg Oral Given 8/4/20 2107)   vancomycin (VANCOCIN) 2,000 mg in sodium chloride 0 9 % 500 mL IVPB (2,000 mg Intravenous New Bag 8/4/20 2258)   HYDROmorphone (DILAUDID) injection 2 mg (2 mg Intravenous Given 8/5/20 0450)       Diagnostic Studies  Results Reviewed     Procedure Component Value Units Date/Time    Blood culture #1 [[id number]] Collected:  08/04/20 2200    Lab Status:  Preliminary result Specimen:  Blood from Arm, Right Updated:  08/05/20 1101     Blood Culture Received in Microbiology Lab  Culture in Progress      Blood culture #2 [[id number]] Collected:  08/04/20 2223    Lab Status: Preliminary result Specimen:  Blood from Arm, Left Updated:  08/05/20 1101     Blood Culture Received in Microbiology Lab  Culture in Progress  C-reactive protein [[id number]]  (Abnormal) Collected:  08/04/20 2106    Lab Status:  Final result Specimen:  Blood from Arm, Right Updated:  08/04/20 2135     CRP 28 3 mg/L     Comprehensive metabolic panel [[id number]]  (Abnormal) Collected:  08/04/20 2106    Lab Status:  Final result Specimen:  Blood from Arm, Right Updated:  08/04/20 2134     Sodium 133 mmol/L      Potassium 3 5 mmol/L      Chloride 100 mmol/L      CO2 26 mmol/L      ANION GAP 7 mmol/L      BUN 17 mg/dL      Creatinine 0 87 mg/dL      Glucose 114 mg/dL      Calcium 9 0 mg/dL      AST 15 U/L      ALT 16 U/L      Alkaline Phosphatase 43 U/L      Total Protein 6 6 g/dL      Albumin 4 1 g/dL      Total Bilirubin 0 70 mg/dL      eGFR 104 ml/min/1 73sq m     Narrative:       [place] guidelines for Chronic Kidney Disease (CKD):     Stage 1 with normal or high GFR (GFR > 90 mL/min/1 73 square meters)    Stage 2 Mild CKD (GFR = 60-89 mL/min/1 73 square meters)    Stage 3A Moderate CKD (GFR = 45-59 mL/min/1 73 square meters)    Stage 3B Moderate CKD (GFR = 30-44 mL/min/1 73 square meters)    Stage 4 Severe CKD (GFR = 15-29 mL/min/1 73 square meters)    Stage 5 End Stage CKD (GFR <15 mL/min/1 73 square meters)  Note: GFR calculation is accurate only with a steady state creatinine    Lactic acid [[id number]]  (Normal) Collected:  08/04/20 2106    Lab Status:  Final result Specimen:  Blood from Arm, Right Updated:  08/04/20 2131     LACTIC ACID 0 8 mmol/L     Narrative:       Result may be elevated if tourniquet was used during collection      CBC and differential [[id number]]  (Abnormal) Collected:  08/04/20 2106    Lab Status:  Final result Specimen:  Blood from Arm, Right Updated:  08/04/20 2115     WBC 13 30 Thousand/uL      RBC 4 26 Million/uL      Hemoglobin 12 5 g/dL Hematocrit 36 0 %      MCV 85 fL      MCH 29 2 pg      MCHC 34 6 g/dL      RDW 12 7 %      MPV 7 9 fL      Platelets 267 Thousands/uL      Neutrophils Relative 82 %      Lymphocytes Relative 10 %      Monocytes Relative 7 %      Eosinophils Relative 0 %      Basophils Relative 0 %      Neutrophils Absolute 11 00 Thousands/µL      Lymphocytes Absolute 1 40 Thousands/µL      Monocytes Absolute 0 90 Thousand/µL      Eosinophils Absolute 0 00 Thousand/µL      Basophils Absolute 0 00 Thousands/µL                  XR elbow 3+ vw LEFT   Final Result by Sundeep Hansen MD (08/05 0350)      Soft tissue swelling overlying the olecranon which may be seen with olecranon bursitis  Workstation performed: [id number]                    Procedures  Procedures         ED Course       US AUDIT      Most Recent Value   Initial Alcohol Screen: US AUDIT-C    1  How often do you have a drink containing alcohol?  0 Filed at: 08/04/2020 2046   Audit-C Score  0 Filed at: 08/04/2020 2046                  HENOK/DAST-10      Most Recent Value   How many times in the past year have you    Used an illegal drug or used a prescription medication for non-medical reasons? Never Filed at: 08/04/2020 2046                                MDM      Disposition  Final diagnoses:   Olecranon bursitis   Septic bursitis     Time reflects when diagnosis was documented in both MDM as applicable and the Disposition within this note     Time User Action Codes Description Comment    8/4/2020 11:19 PM Stacey Figueredo Add [M70 20] Olecranon bursitis     8/4/2020 11:19 PM Briseyda Dubon Add [M71 10] Septic bursitis     8/4/2020 11:31 PM Dax Fofana Add [M70 22] Olecranon bursitis of left elbow       ED Disposition     ED Disposition Condition Date/Time Comment    Admit Stable Tue Aug 4, 2020 11:19 PM Case was discussed with nick ap and the patient's admission status was agreed to be Admission Status: observation status to the service of Dr Brett Rosales   Follow-up Information    None         Current Discharge Medication List      CONTINUE these medications which have NOT CHANGED    Details   albuterol (PROVENTIL HFA,VENTOLIN HFA) 90 mcg/act inhaler Inhale 2 puffs every 6 (six) hours as needed    Comments: Substitution to a formulary equivalent within the same pharmaceutical class is authorized  atorvastatin (LIPITOR) 80 mg tablet       Cholecalciferol 1 25 MG (26872 UT) TABS take 1 capsule by mouth ONCE EVERY WEEK FOR 4 WEEKS THEN ONE CAPSULE ONCE EVERY MONTH      lisinopril (ZESTRIL) 10 mg tablet Take by mouth      pantoprazole (PROTONIX) 20 mg tablet Take 20 mg by mouth daily           No discharge procedures on file      PDMP Review     None          ED Provider  Electronically Signed by           Janice Andrade MD  08/05/20 5680

## 2020-08-05 NOTE — UTILIZATION REVIEW
Initial Clinical Review  WAS OBSERVATION 8/4/20 @2319 CONVERTED TO INPATIENT ADMISSION 8/5/20 @1443 DUE TO CONTINUED STAY REQUIRED TO CARE FOR PATIENT WITH SEPTIC BURSITIS FOR WHOM ORTHOPEDIC CONSULT IS NEEDED AND IV ANTBX WILL CONTINUE   Admission: Date/Time/Statement:   Admission Orders (From admission, onward)     Ordered        08/05/20 1443  Inpatient Admission  Once         08/04/20 2319  Place in Observation (expected length of stay for this patient is less than two midnights)  Once                   Orders Placed This Encounter   Procedures    Inpatient Admission     Standing Status:   Standing     Number of Occurrences:   1     Order Specific Question:   Admitting Physician     Answer:   Donaldo Britt [[id number]]     Order Specific Question:   Level of Care     Answer:   Med Surg [16]     Order Specific Question:   Estimated length of stay     Answer:   More than 2 Midnights     Order Specific Question:   Certification     Answer:   I certify that inpatient services are medically necessary for this patient for a duration of greater than two midnights  See H&P and MD Progress Notes for additional information about the patient's course of treatment  ED Arrival Information     Expected Arrival Acuity Means of Arrival Escorted By Service Admission Type    - 8/4/2020 20:16 Urgent Walk-In Spouse General Medicine Urgent    Arrival Complaint    elbow/arm pain and swelling        Chief Complaint   Patient presents with    Elbow Swelling     Assessment/Plan: 37 yo male to ED from home admitted under observation due to septic L olecranon bursitis  Presented with 24 hrs of L elbow swelling and tenderness that started in AM  By lunchtime, increased swelling, extremely red, very warm, painful  On arrival, febrile 100 6  Exam revealed L elbow tenderness, erythema, and swelling  IV antbx were started with IVF, analgesics/antipyretics, and ortho consulted   Alternating heat and ice      8/5: requiring IV narcotic analgesics for persistent excruciating pain  Unable to flex or extend the elbow  Elbow is extremely swollen and tender  CRP elevated  Continue IVF,  IV vanco, add IV cefepime  LUE elevated on pillows  Per ortho: continue IV antbx for another 24-48 hours  Redness starting to decrease  Add antiinflammatories, warm compresses, and recheck tomorrow       ED Triage Vitals [08/04/20 2043]   Temperature Pulse Respirations Blood Pressure SpO2   99 9 °F (37 7 °C) 89 18 131/77 99 %      Temp Source Heart Rate Source Patient Position - Orthostatic VS BP Location FiO2 (%)   Tympanic Monitor Sitting Left arm --      Pain Score       Worst Possible Pain          Wt Readings from Last 1 Encounters:   08/05/20 (!) 138 kg (305 lb 0 1 oz)     Additional Vital Signs:   Date/Time   Temp   Pulse   Resp   BP   SpO2   O2 Device   Patient Position - Orthostatic VS    08/05/20 0656   99 2 °F (37 3 °C)   96   20   137/72   96 %   None (Room air)   Lying    08/05/20 0038   98 7 °F (37 1 °C)   80   20   138/83   99 %   None (Room air)   Sitting    08/04/20 2157   100 6 °F (38 1 °C)Abnormal                           Pertinent Labs/Diagnostic Test Results:     8/4 L elbow: Soft tissue swelling overlying the olecranon which may be seen with olecranon bursitis  No EKG  Results from last 7 days   Lab Units 08/05/20  1152 08/05/20  0448 08/04/20  2106   WBC Thousand/uL  --  12 90* 13 30*   HEMOGLOBIN g/dL  --  12 5* 12 5*   HEMATOCRIT %  --  35 5* 36 0*   PLATELETS Thousands/uL 185 187 202   NEUTROS ABS Thousands/µL  --  10 70* 11 00*         Results from last 7 days   Lab Units 08/05/20  0448 08/04/20  2106   SODIUM mmol/L 137 133*   POTASSIUM mmol/L 3 3* 3 5   CHLORIDE mmol/L 103 100   CO2 mmol/L 25 26   ANION GAP mmol/L 9 7   BUN mg/dL 15 17   CREATININE mg/dL 0 74 0 87   EGFR ml/min/1 73sq m 111 104   CALCIUM mg/dL 8 5* 9 0     Results from last 7 days   Lab Units 08/04/20  2106   AST U/L 15   ALT U/L 16   ALK PHOS U/L 43   TOTAL PROTEIN g/dL 6 6   ALBUMIN g/dL 4 1   TOTAL BILIRUBIN mg/dL 0 70         Results from last 7 days   Lab Units 08/05/20  0448 08/04/20  2106   GLUCOSE RANDOM mg/dL 100* 114*       Results from last 7 days   Lab Units 08/04/20  2106   LACTIC ACID mmol/L 0 8       Results from last 7 days   Lab Units 08/04/20  2106   CRP mg/L 28 3*     ED Treatment:   Medication Administration from 08/04/2020 2016 to 08/05/2020 0036       Date/Time Order Dose Route Action     08/04/2020 2107 sodium chloride 0 9 % bolus 1,000 mL 1,000 mL Intravenous New Bag     08/04/2020 2107 acetaminophen (TYLENOL) tablet 650 mg 650 mg Oral Given     08/04/2020 2258 vancomycin (VANCOCIN) 2,000 mg in sodium chloride 0 9 % 500 mL IVPB 2,000 mg Intravenous New Bag        Past Medical History:   Diagnosis Date    Asthma     Hypertension     Testicular cancer ([facility] [address] )      Present on Admission:   Olecranon bursitis of left elbow   Essential hypertension   Mixed hyperlipidemia      Admitting Diagnosis: Olecranon bursitis [M70 20]  Elbow swelling [M25 429]  Septic bursitis [M71 10]  Olecranon bursitis of left elbow [M70 22]  Age/Sex: 38 y o  male  Admission Orders:  Scheduled Medications:  atorvastatin, 80 mg, Oral, Daily With Dinner  cefepime, 2,000 mg, Intravenous, Q12H  enoxaparin, 40 mg, Subcutaneous, Daily  lisinopril, 10 mg, Oral, Daily  vancomycin, 15 mg/kg (Adjusted), Intravenous, Q8H    IV dilaudid x 1 8/5 @0450    Continuous IV Infusions:  sodium chloride, 100 mL/hr, Intravenous, Continuous      PRN Meds:  acetaminophen, 650 mg, Oral, Q6H PRN  albuterol, 2 puff, Inhalation, Q6H PRN  morphine injection, 2 mg, Intravenous, Q4H PRN x 3 8/5 @3760, 2130, 1344  ondansetron, 4 mg, Intravenous, Q6H PRN    SCD's  Heat/ice  Cardiac diet    IP CONSULT TO [address] [place] [address] Utilization Review Department  [email or web address]  ATTENTION: Please call with any questions or concerns to [phone] and carefully listen to the prompts so that you are directed to the right person  All voicemails are confidential   Yonas Waters all requests for admission clinical reviews, approved or denied determinations and any other requests to dedicated fax number below belonging to the campus where the patient is receiving treatment   List of dedicated fax numbers for the Facilities:  [address] DENIALS (Administrative/Medical Necessity) [phone]   [address]  [facility] (Maternity/NICU/Pediatrics) [phone]   Willam Navarrete [phone]   Fracisco Medellin [phone]   [facility] [phone]   Damián Garcia [phone]   [address] [facility] [address] [facility] [phone]   Johnnie Brewer [phone]   [address] [facility] [address]  [address] [facility] [address] [facility] [phone]

## 2020-08-05 NOTE — H&P
H&P- Cruzito Matthews 1975, 38 y o  male MRN: [id number]    Unit/Bed#: -01 Encounter: [id number]    Primary Care Provider: Yoly Lu DO   Date and time admitted to hospital: 8/4/2020  8:39 PM        * Olecranon bursitis of left elbow  Assessment & Plan  · Sudden onset = septic  · Patient with elevated WBC, and temperature of 100 6°  · Dr Alexi Meeks made aware of orthopedic consultation by ED provider  · Approximately 24 hours of redness, increasing warmth, edema  · Continue vancomycin  · Will alternate heat and ice  · Morphine for pain control  · Continue IV fluids at 100 mL/hour    Essential hypertension  Assessment & Plan  · Continue lisinopril    Mixed hyperlipidemia  Assessment & Plan  · Continue Lipitor        VTE Prophylaxis: Enoxaparin (Lovenox)  Code Status:  Full code  POLST: POLST is not applicable to this patient  Discussion with family:  Discussed with patient    Anticipated Length of Stay:  Patient will be admitted on an Observation basis with an anticipated length of stay of  < 2 midnights  Justification for Hospital Stay:  Consultation with orthopedics    Chief Complaint:   Left elbow swelling    History of Present Illness:    Cruzito Matthews is a 38 y o  male who presents with left elbow swelling and increased pain  Patient states that approximately 24 hours ago in the morning of 08/04/2020, the patient noticed that his left elbow was swollen and very tender to touch  By lunch time on 08/04/2020 the patient's left elbow was extremely red, very warm to touch, and significantly painful  Patient came to the emergency department and was diagnosed with septic bursitis of left elbow  He was started on vancomycin 2 g IV, normal saline solution, and given Tylenol for temperature of 100 6°  Orthopedics were called by ED provider, and will see patient in the a m       We will alternate heat and cold for patient's comfort, and order morphine for pain control      Review of Systems:  Review of Systems   Musculoskeletal: Positive for joint swelling  Past Medical and Surgical History:   Past Medical History:   Diagnosis Date    Asthma     Hypertension     Testicular cancer ([facility] [address] )        Past Surgical History:   Procedure Laterality Date    HERNIA REPAIR      LYMPHADENECTOMY      ROTATOR CUFF REPAIR      SURGERY SCROTAL / TESTICULAR         Meds/Allergies:  Prior to Admission medications    Medication Sig Start Date End Date Taking? Authorizing Provider   albuterol (PROVENTIL HFA,VENTOLIN HFA) 90 mcg/act inhaler Inhale 2 puffs every 6 (six) hours as needed 11/8/19 11/7/20 Yes Historical Provider, MD   Cholecalciferol 1 25 MG (86661 UT) TABS take 1 capsule by mouth ONCE EVERY WEEK FOR 4 WEEKS THEN ONE CAPSULE ONCE EVERY MONTH 4/30/20  Yes Historical Provider, MD   atorvastatin (LIPITOR) 80 mg tablet     Historical Provider, MD   lisinopril (ZESTRIL) 10 mg tablet Take by mouth    Historical Provider, MD   pantoprazole (PROTONIX) 20 mg tablet Take 20 mg by mouth daily    Historical Provider, MD     I have reviewed home medications with patient personally  Allergies:    Allergies   Allergen Reactions    Ibuprofen Other (See Comments) and GI Bleeding     Other reaction(s): severe chest pain  800mg  800mg  Other reaction(s): Free Text  800mg    Cat Hair Extract Wheezing    Grass Extracts [Gramineae Pollens] Headache     Eyes water    Lidocaine Hives     rash    Pollen Extract Sneezing    Bee Pollen Rash    Dust Mite Extract Sneezing    Mold Extract [Trichophyton] Sneezing       Social History:  Marital Status: /Civil Union   Occupation:    Patient Pre-hospital Living Situation:  At home   Patient Pre-hospital Level of Mobility:  Full functioning  Patient Pre-hospital Diet Restrictions:  Cardiac  Substance Use History:   Social History     Substance and Sexual Activity   Alcohol Use Yes    Frequency: Monthly or less    Drinks per session: 1 or 2    Binge frequency: Never    Comment: few beers a month     Social History     Tobacco Use   Smoking Status Former Smoker    Packs/day: 0 50    Years: 1 00    Pack years: 0 50    Types: Cigarettes    Start date: 1992    Last attempt to quit: 10/5/1993    Years since quittin 8   Smokeless Tobacco Never Used     Social History     Substance and Sexual Activity   Drug Use Never       Family History:  I have reviewed the patients family history    Physical Exam:   Vitals:   Blood Pressure: 138/83 (20)  Pulse: 80 (20)  Temperature: 98 7 °F (37 1 °C) (20)  Temp Source: Temporal (20)  Respirations: 20 (20)  Height: 6' 9" (20)  Weight - Scale: (!) 138 kg (305 lb 0 1 oz) (20)  SpO2: 99 % (20)    Physical Exam   Constitutional: He is oriented to person, place, and time  He appears well-developed  He is cooperative  HENT:   Head: Normocephalic and atraumatic  Nose: Nose normal    Mouth/Throat: Mucous membranes are moist    Eyes: Conjunctivae are normal    Neck: Normal range of motion  Cardiovascular: Normal rate, regular rhythm, normal heart sounds and normal pulses  Pulmonary/Chest: Effort normal and breath sounds normal    Abdominal: Soft  Normal appearance and bowel sounds are normal    Musculoskeletal:      Left forearm: He exhibits tenderness, swelling and edema  Arms:    Neurological: He is alert and oriented to person, place, and time  Skin:        Psychiatric: His speech is normal and behavior is normal  Mood normal        Additional Data:   Lab Results: I have personally reviewed pertinent reports      Results from last 7 days   Lab Units 20   WBC Thousand/uL 13 30*   HEMOGLOBIN g/dL 12 5*   HEMATOCRIT % 36 0*   PLATELETS Thousands/uL 202   NEUTROS PCT % 82*   LYMPHS PCT % 10*   MONOS PCT % 7   EOS PCT % 0     Results from last 7 days   Lab Units 20   SODIUM mmol/L 133*   POTASSIUM mmol/L 3 5 CHLORIDE mmol/L 100   CO2 mmol/L 26   BUN mg/dL 17   CREATININE mg/dL 0 87   CALCIUM mg/dL 9 0   ALK PHOS U/L 43   ALT U/L 16   AST U/L 15                   Imaging: I have personally reviewed pertinent reports  XR elbow 3+ vw LEFT    (Results Pending)       EKG, Pathology, and Other Studies Reviewed on Admission:   · EKG:  Not performed in ED    Epic Records Reviewed: Yes     ** Please Note: This note has been constructed using a voice recognition system   **

## 2020-08-06 VITALS
SYSTOLIC BLOOD PRESSURE: 133 MMHG | HEIGHT: 78 IN | TEMPERATURE: 98.8 F | RESPIRATION RATE: 18 BRPM | HEART RATE: 62 BPM | DIASTOLIC BLOOD PRESSURE: 61 MMHG | OXYGEN SATURATION: 99 % | WEIGHT: 305.01 LBS | BODY MASS INDEX: 35.29 KG/M2

## 2020-08-06 LAB
ANION GAP SERPL CALCULATED.3IONS-SCNC: 6 MMOL/L (ref 4–13)
BASOPHILS # BLD AUTO: 0 THOUSANDS/ΜL (ref 0–0.1)
BASOPHILS NFR BLD AUTO: 0 % (ref 0–2)
BUN SERPL-MCNC: 11 MG/DL (ref 7–25)
CALCIUM SERPL-MCNC: 8.3 MG/DL (ref 8.6–10.5)
CHLORIDE SERPL-SCNC: 104 MMOL/L (ref 98–107)
CO2 SERPL-SCNC: 27 MMOL/L (ref 21–31)
CREAT SERPL-MCNC: 0.67 MG/DL (ref 0.7–1.3)
EOSINOPHIL # BLD AUTO: 0.1 THOUSAND/ΜL (ref 0–0.61)
EOSINOPHIL NFR BLD AUTO: 1 % (ref 0–5)
ERYTHROCYTE [DISTWIDTH] IN BLOOD BY AUTOMATED COUNT: 13 % (ref 11.5–14.5)
GFR SERPL CREATININE-BSD FRML MDRD: 116 ML/MIN/1.73SQ M
GLUCOSE SERPL-MCNC: 99 MG/DL (ref 65–99)
HCT VFR BLD AUTO: 32.4 % (ref 42–47)
HGB BLD-MCNC: 11.5 G/DL (ref 14–18)
LYMPHOCYTES # BLD AUTO: 1.4 THOUSANDS/ΜL (ref 0.6–4.47)
LYMPHOCYTES NFR BLD AUTO: 14 % (ref 21–51)
MCH RBC QN AUTO: 30.1 PG (ref 26–34)
MCHC RBC AUTO-ENTMCNC: 35.5 G/DL (ref 31–37)
MCV RBC AUTO: 85 FL (ref 81–99)
MONOCYTES # BLD AUTO: 0.9 THOUSAND/ΜL (ref 0.17–1.22)
MONOCYTES NFR BLD AUTO: 9 % (ref 2–12)
NEUTROPHILS # BLD AUTO: 7.6 THOUSANDS/ΜL (ref 1.4–6.5)
NEUTS SEG NFR BLD AUTO: 76 % (ref 42–75)
PLATELET # BLD AUTO: 167 THOUSANDS/UL (ref 149–390)
PMV BLD AUTO: 8.2 FL (ref 8.6–11.7)
POTASSIUM SERPL-SCNC: 3.3 MMOL/L (ref 3.5–5.5)
PROCALCITONIN SERPL-MCNC: <0.05 NG/ML
RBC # BLD AUTO: 3.83 MILLION/UL (ref 4.3–5.9)
SODIUM SERPL-SCNC: 137 MMOL/L (ref 134–143)
WBC # BLD AUTO: 10 THOUSAND/UL (ref 4.8–10.8)

## 2020-08-06 PROCEDURE — 85025 COMPLETE CBC W/AUTO DIFF WBC: CPT | Performed by: HOSPITALIST

## 2020-08-06 PROCEDURE — 99231 SBSQ HOSP IP/OBS SF/LOW 25: CPT | Performed by: ORTHOPAEDIC SURGERY

## 2020-08-06 PROCEDURE — 99239 HOSP IP/OBS DSCHRG MGMT >30: CPT | Performed by: HOSPITALIST

## 2020-08-06 PROCEDURE — 84145 PROCALCITONIN (PCT): CPT | Performed by: HOSPITALIST

## 2020-08-06 PROCEDURE — 80048 BASIC METABOLIC PNL TOTAL CA: CPT | Performed by: HOSPITALIST

## 2020-08-06 RX ORDER — OXYCODONE HYDROCHLORIDE AND ACETAMINOPHEN 5; 325 MG/1; MG/1
1 TABLET ORAL EVERY 4 HOURS PRN
Status: DISCONTINUED | OUTPATIENT
Start: 2020-08-06 | End: 2020-08-06 | Stop reason: HOSPADM

## 2020-08-06 RX ORDER — DOXYCYCLINE HYCLATE 100 MG/1
100 CAPSULE ORAL EVERY 12 HOURS SCHEDULED
Qty: 24 CAPSULE | Refills: 0 | Status: SHIPPED | OUTPATIENT
Start: 2020-08-06 | End: 2020-08-18

## 2020-08-06 RX ORDER — DOXYCYCLINE HYCLATE 100 MG/1
100 CAPSULE ORAL EVERY 12 HOURS SCHEDULED
Status: DISCONTINUED | OUTPATIENT
Start: 2020-08-06 | End: 2020-08-06 | Stop reason: HOSPADM

## 2020-08-06 RX ORDER — MELOXICAM 15 MG/1
15 TABLET ORAL DAILY
Qty: 10 TABLET | Refills: 0 | Status: SHIPPED | OUTPATIENT
Start: 2020-08-07

## 2020-08-06 RX ORDER — OXYCODONE HYDROCHLORIDE AND ACETAMINOPHEN 5; 325 MG/1; MG/1
1 TABLET ORAL EVERY 4 HOURS PRN
Qty: 15 TABLET | Refills: 0 | Status: SHIPPED | OUTPATIENT
Start: 2020-08-06 | End: 2020-08-16

## 2020-08-06 RX ADMIN — VANCOMYCIN HYDROCHLORIDE 1750 MG: 1 INJECTION, POWDER, LYOPHILIZED, FOR SOLUTION INTRAVENOUS at 00:03

## 2020-08-06 RX ADMIN — MORPHINE SULFATE 2 MG: 2 INJECTION, SOLUTION INTRAMUSCULAR; INTRAVENOUS at 07:53

## 2020-08-06 RX ADMIN — MORPHINE SULFATE 2 MG: 2 INJECTION, SOLUTION INTRAMUSCULAR; INTRAVENOUS at 00:03

## 2020-08-06 RX ADMIN — MELOXICAM 15 MG: 7.5 TABLET ORAL at 08:40

## 2020-08-06 RX ADMIN — VANCOMYCIN HYDROCHLORIDE 1500 MG: 1 INJECTION, POWDER, LYOPHILIZED, FOR SOLUTION INTRAVENOUS at 06:25

## 2020-08-06 RX ADMIN — ENOXAPARIN SODIUM 40 MG: 40 INJECTION SUBCUTANEOUS at 08:40

## 2020-08-06 RX ADMIN — CEFEPIME HYDROCHLORIDE 2000 MG: 2 INJECTION, SOLUTION INTRAVENOUS at 09:22

## 2020-08-06 RX ADMIN — LISINOPRIL 10 MG: 5 TABLET ORAL at 08:40

## 2020-08-06 NOTE — NURSING NOTE
Discharge order received  Instructions reviewed with pt  Questions addressed  Iv removed and covered with dsd  Pt to be transported to [facility] for d/c home with wife

## 2020-08-06 NOTE — DISCHARGE INSTRUCTIONS
Elbow Bursitis   WHAT YOU NEED TO KNOW:   What is elbow bursitis? Elbow bursitis is inflammation of the bursa in your elbow  The bursa is a fluid-filled sac that acts as a cushion between a bone and a tendon  A tendon is a cord of strong tissue that connects muscles to bones  The bursa is located right under the point of your elbow  What causes elbow bursitis? · An injury, such as a fall    · Overuse of your elbow, such as when you play tennis, vacuum, or swing a hammer    · Pressure on your elbows, such as when you lean on your elbows    · Bacterial infection    · Medical conditions, such as rheumatoid arthritis or gout  What are the signs and symptoms of elbow bursitis? · Pain or tenderness when you move your elbow    · Redness or swelling on or around the point of your elbow    · Decreased movement of your elbow    · Warmth of the skin over your elbow  How is elbow bursitis diagnosed? Your healthcare provider will examine your elbow and ask about your injury or activities  You may need any of the following:  · Blood tests:  Your blood is tested for signs of infection  Healthcare providers may also check for diseases that may be causing your bursitis, such as rheumatoid arthritis  · X-rays: These pictures will show bone position problems, arthritis, or a fracture  · MRI:  This scan uses powerful magnets and a computer to take pictures of your elbow  An MRI may show tissue damage or arthritis  You may be given dye to help the pictures show up better  Tell the healthcare provider if you have ever had an allergic reaction to contrast dye  Do not enter the MRI room with anything metal  Metal can cause serious injury  Tell the healthcare provider if you have any metal in or on your body  · Fluid culture:  Healthcare providers use a needle to drain fluid from your bursa  The fluid will be sent to a lab and tested for infection  Removal of bursa fluid may also help relieve your symptoms    How is elbow bursitis treated? · Medicine:      ¨ NSAIDs:  These medicines decrease swelling, pain, and fever  NSAIDs are available without a doctor's order  Ask your healthcare provider which medicine is right for you  Ask how much to take and when to take it  Take as directed  NSAIDs can cause stomach bleeding and kidney problems if not taken correctly  ¨ Antibiotics: These help fight an infection caused by bacteria  You may need antibiotics if your bursitis is caused by infection  ¨ Steroid injection: This shot will help decrease pain and swelling  · Surgery: You may need surgery to remove your bursa or part of your elbow bone  Surgery is only done when other treatments do not work  What are the risks of elbow bursitis? The infection may spread to nearby joints  You may develop long-term bursitis  This may include pain and severe limitation of movement  How can I manage my symptoms? · Rest:  Rest your elbow as much as possible to decrease pain and swelling  Slowly start to do more each day  Return to your daily activities as directed  · Ice:  Ice helps decrease swelling and pain  Ice may also help prevent tissue damage  Use an ice pack, or put crushed ice in a plastic bag  Cover it with a towel and place it on your elbow for 15 to 20 minutes, 3 to 4 times each day, as directed  · Compress:  Healthcare providers may wrap your arm with tape or an elastic bandage to decrease swelling  Loosen the elastic bandage if you start to lose feeling in your fingers  · Elevate:  Raise your elbow above the level of your heart as often as you can  This will help decrease swelling and pain  Prop your elbow on pillows or blankets to keep it elevated comfortably  · Physical therapy:  A physical therapist teaches you exercises to help improve movement and strength, and to decrease pain  How can I prevent elbow bursitis? · Avoid injury and pressure to your elbows:   Wear elbow pads or protectors when you play sports  Do not lean on your elbows or clench your fists  Do not tightly  small items, such as tools or pens  · Stretch, warm up, and cool down:  Always stretch and do warmup and cool-down exercises before and after you exercise  This will help loosen your muscles and decrease stress on your elbows  Rest between workouts  When should I contact my healthcare provider? · Your pain and swelling increase  · Your symptoms do not improve after 10 days of treatment  · You have a fever  · You have questions or concerns about your condition or care  CARE AGREEMENT:   You have the right to help plan your care  Learn about your health condition and how it may be treated  Discuss treatment options with your caregivers to decide what care you want to receive  You always have the right to refuse treatment  The above information is an  only  It is not intended as medical advice for individual conditions or treatments  Talk to your doctor, nurse or pharmacist before following any medical regimen to see if it is safe and effective for you  © 2017 [facility] Information is for End User's use only and may not be sold, redistributed or otherwise used for commercial purposes  All illustrations and images included in CareNotes® are the copyrighted property of [employer]  or Tam Homer  Elbow Bursitis   WHAT YOU NEED TO KNOW:   Elbow bursitis is inflammation of the bursa in your elbow  The bursa is a fluid-filled sac that acts as a cushion between a bone and a tendon  A tendon is a cord of strong tissue that connects muscles to bones  The bursa is located right under the point of your elbow  DISCHARGE INSTRUCTIONS:   Medicines:   · NSAIDs:  These medicines decrease swelling, pain, and fever  NSAIDs are available without a doctor's order  Ask your healthcare provider which medicine is right for you  Ask how much to take and when to take it  Take as directed  NSAIDs can cause stomach bleeding and kidney problems if not taken correctly  · Antibiotics: These help fight an infection caused by bacteria  You may need antibiotics if your bursitis is caused by infection  · Take your medicine as directed  Contact your healthcare provider if you think your medicine is not helping or if you have side effects  Tell him of her if you are allergic to any medicine  Keep a list of the medicines, vitamins, and herbs you take  Include the amounts, and when and why you take them  Bring the list or the pill bottles to follow-up visits  Carry your medicine list with you in case of an emergency  Manage your symptoms:   · Rest:  Rest your elbow as much as possible to decrease pain and swelling  Slowly start to do more each day  Return to your daily activities as directed  · Ice:  Ice helps decrease swelling and pain  Ice may also help prevent tissue damage  Use an ice pack, or put crushed ice in a plastic bag  Cover it with a towel and place it on your elbow for 15 to 20 minutes, 3 to 4 times each day, as directed  · Compress:  Healthcare providers may wrap your arm with tape or an elastic bandage to decrease swelling  Loosen the elastic bandage if you start to lose feeling in your fingers  · Elevate:  Raise your elbow above the level of your heart as often as you can  This will help decrease swelling and pain  Prop your elbow on pillows or blankets to keep it elevated comfortably  Physical therapy:  A physical therapist teaches you exercises to help improve movement and strength, and to decrease pain  Prevent another elbow injury:   · Avoid injury and pressure to your elbows: Wear elbow pads or protectors when you play sports  Do not lean on your elbows or clench your fists  Do not tightly  small items, such as tools or pens  · Stretch, warm up, and cool down:  Always stretch and do warmup and cool-down exercises before and after you exercise   This will help loosen your muscles and decrease stress on your elbow  Rest between workouts  Follow up with your healthcare provider as directed:  Write down your questions so you remember to ask them during your visits  Contact your healthcare provider if:   · Your pain and swelling increase  · Your symptoms do not improve after 10 days of treatment  · You have a fever  · You have questions or concerns about your condition or care  © 2017 [facility] Information is for End User's use only and may not be sold, redistributed or otherwise used for commercial purposes  All illustrations and images included in CareNotes® are the copyrighted property of [employer]  or Tam Coronel  The above information is an  only  It is not intended as medical advice for individual conditions or treatments  Talk to your doctor, nurse or pharmacist before following any medical regimen to see if it is safe and effective for you

## 2020-08-06 NOTE — PROGRESS NOTES
Vancomycin Assessment    Raquel Ghosh is a 38 y o  male who is currently receiving vancomycin 1750mg IV Q8H for other bone/joint infection   Relevant clinical data and objective history reviewed:  Creatinine   Date Value Ref Range Status   08/05/2020 0 74 0 70 - 1 30 mg/dL Final     Comment:     Standardized to IDMS reference method   08/04/2020 0 87 0 70 - 1 30 mg/dL Final     Comment:     Standardized to IDMS reference method     /70 (BP Location: Right arm)   Pulse 77   Temp 99 °F (37 2 °C) (Temporal)   Resp 16   Ht 6' 9" (2 057 m)   Wt (!) 138 kg (305 lb 0 1 oz)   SpO2 97%   BMI 32 68 kg/m²   I/O last 3 completed shifts: In: 1460 [P O :720; I V :1601 7; IV Piggyback:1133 3]  Out: 725 [Urine:725]  Lab Results   Component Value Date/Time    BUN 15 08/05/2020 04:48 AM    WBC 12 90 (H) 08/05/2020 04:48 AM    HGB 12 5 (L) 08/05/2020 04:48 AM    HCT 35 5 (L) 08/05/2020 04:48 AM    MCV 84 08/05/2020 04:48 AM     08/05/2020 11:52 AM     Temp Readings from Last 3 Encounters:   08/05/20 99 °F (37 2 °C) (Temporal)   08/19/19 97 8 °F (36 6 °C) (Temporal)     Vancomycin Days of Therapy: 2    Assessment/Plan  The patient is currently on vancomycin utilizing scheduled dosing  Baseline risks associated with therapy include: concomitant nephrotoxic medications  The patient is receiving 1750mg IV Q8H with the most recent vancomycin level being at steady-state and sub-therapeutic (11 0) based on a goal of 15-20 (appropriate for most indications) ; therefore, after clinical evaluation will be changed to 1500mg IV Q6H   Pharmacy will continue to follow closely for s/sx of nephrotoxicity, infusion reactions, and appropriateness of therapy  BMP and CBC will be ordered per protocol  Plan for trough as patient approaches steady state, prior to the 4th  dose at approximately 1730 on 8/6/20  Pharmacy will continue to follow the patients culture results and clinical progress daily      Jamison Aponte Pharmacist

## 2020-08-06 NOTE — PROGRESS NOTES
Progress Note - Orthopedics   Jourdan Slade 38 y o  male MRN: [id number]  Unit/Bed#: -01 Encounter: [id number]    Assessment:  Resolving cellulitis left upper extremity    Plan:  Continue Mobic as an anti-inflammatory  Erythema and swelling seem to be improving  Continue to use warm, moist compresses  DC planning  White blood cell count continuing to decrease    Weight bearing:  Weight-bearing as tolerated    VTE Pharmacologic Prophylaxis: Sequential compression device (Venodyne)   VTE Mechanical Prophylaxis: sequential compression device    Subjective:  Doing well today, states that the swelling and redness is improved    Vitals: Blood pressure 133/61, pulse 62, temperature 98 8 °F (37 1 °C), temperature source Tympanic, resp  rate 18, height 6' 9" (2 057 m), weight (!) 138 kg (305 lb 0 1 oz), SpO2 99 %  ,Body mass index is 32 68 kg/m²        Intake/Output Summary (Last 24 hours) at 8/6/2020 0841  Last data filed at 8/6/2020 9893  Gross per 24 hour   Intake 3455 ml   Output 1250 ml   Net 2205 ml       Invasive Devices     Peripheral Intravenous Line            Peripheral IV 08/04/20 Right Antecubital 1 day                Physical Exam:   Left upper extremity is neurovascularly intact  Fingers are pink and mobile  Compartments are soft  Mild erythema and edema around the olecranon bursa  Mild tenderness upon palpation of the olecranon bursa  Sensation intact  Range of motion of the elbow intact      Lab, Imaging and other studies:   CBC:   Lab Results   Component Value Date    WBC 10 00 08/06/2020    HGB 11 5 (L) 08/06/2020    HCT 32 4 (L) 08/06/2020    MCV 85 08/06/2020     08/06/2020    MCH 30 1 08/06/2020    MCHC 35 5 08/06/2020    RDW 13 0 08/06/2020    MPV 8 2 (L) 08/06/2020     CMP:   Lab Results   Component Value Date    SODIUM 137 08/06/2020     08/06/2020    CO2 27 08/06/2020    BUN 11 08/06/2020    CREATININE 0 67 (L) 08/06/2020    CALCIUM 8 3 (L) 08/06/2020    EGFR 116 08/06/2020

## 2020-08-06 NOTE — ASSESSMENT & PLAN NOTE
· With some surrounding left elbow cellulitis  · Symptoms have significantly improved  · Patient is status post an orthopedic evaluation - no need for any acute phase surgical intervention as an inpatient  · Case reviewed with Dr Jesus butts for discharge home  · One discharge home on doxycycline for 12 more days, Mobic and p r n  Percocet  · Outpatient follow-up with orthopedic surgery and his primary care physician

## 2020-08-06 NOTE — DISCHARGE SUMMARY
Discharge- Gavino Bob 1975, 38 y o  male MRN: [id number]    Unit/Bed#: -01 Encounter: [id number]    Primary Care Provider: Romel Villela DO   Date and time admitted to hospital: 8/4/2020  8:39 PM        * Olecranon bursitis of left elbow  Assessment & Plan  · With some surrounding left elbow cellulitis  · Symptoms have significantly improved  · Patient is status post an orthopedic evaluation - no need for any acute phase surgical intervention as an inpatient  · Case reviewed with Dr Kwaku butts for discharge home  · One discharge home on doxycycline for 12 more days, Mobic and p r n  Percocet  · Outpatient follow-up with orthopedic surgery and his primary care physician  Essential hypertension  Assessment & Plan  · DC home on pre-admit meds at pre-admit dosages    Mixed hyperlipidemia  Assessment & Plan  · DC home on Lipitor          Discharging Physician / Practitioner: Ivy Saleh MD  PCP: Romel Villela DO  Admission Date:   Admission Orders (From admission, onward)     Ordered        08/05/20 1443  Inpatient Admission  Once         08/04/20 2319  Place in Observation (expected length of stay for this patient is less than two midnights)  Once                   Discharge Date: 08/06/20    Resolved Problems  Date Reviewed: 8/4/2020    None          Consultations During Hospital Stay:  · Orthopedic surgery    Procedures Performed:   · None    Significant Findings / Test Results:   · X-ray left elbow-Soft tissue swelling overlying the olecranon which may be seen with olecranon bursitis  Incidental Findings:   · None     Test Results Pending at Discharge (will require follow up):    · None     Outpatient Tests Requested:  · None    Complications:     None    Reason for Admission:  Left elbow pain/swelling/Olecranon bursitis/left elbow cellulitis    Hospital Course:     Gavino Bob is a 38 y o  male patient who originally presented to the hospital on 8/4/2020 due to left elbow pain and swelling  Please refer to the initial history and physical examination completed by Jt Tapia for the initial presenting features and complaints  In brief, the patient is a 41-year-old male who presented to the hospital back on day of admission after he experienced a sudden onset of left elbow pain and swelling  He was diagnosed with a left elbow cellulitis/olecranon bursitis and was admitted to [facility]  An orthopedic evaluation was obtained  Patient was started on IV vancomycin and IV cefepime  He was additionally started on anti-inflammatories - Mobic  Pain was controlled with IV narcotics which included IV morphine and IV Dilaudid  C reactive protein was elevated  T-max in the hospital is 100 6°  On the morning of 08/06/2020, the patient felt significantly better  After being seen by Orthopedic surgery, the patient was cleared for discharge from their standpoint  There was no indication for any type of acute phase inpatient surgical intervention  Recommendations were made for antibiotics  The patient received 2 days worth of vancomycin and cefepime in-house, he will go home on 12 more days worth of doxycycline  When all is said and done he will have  completed a total of a 14 day course of antibiotics  He was discharged home on all of his other pre-admission medications at the preadmission dosages  He will follow up in the outpatient setting with both his primary care physician, and with orthopedic surgery  Prescriptions were provided for the doxycycline, Mobic and Percocet  Please see above list of diagnoses and related plan for additional information  Condition at Discharge: good     Discharge Day Visit / Exam:     Subjective:  Patient seen and examined, looks and feels a lot better than yesterday    Vitals: Blood Pressure: 133/61 (08/06/20 0725)  Pulse: 62 (08/06/20 0725)  Temperature: 98 8 °F (37 1 °C) (08/06/20 0725)  Temp Source: Tympanic (08/06/20 0725)  Respirations: 18 (08/06/20 0725)  Height: 6' 9" (205 7 cm) (08/05/20 0038)  Weight - Scale: (!) 138 kg (305 lb 0 1 oz) (08/05/20 0038)  SpO2: 99 % (08/06/20 0725)  Exam:   Physical Exam   Constitutional: He is oriented to person, place, and time  He appears well-developed  HENT:   Head: Normocephalic and atraumatic  Nose: Nose normal    Eyes: Pupils are equal, round, and reactive to light  Conjunctivae are normal    Neck: Normal range of motion  Neck supple  No JVD present  No thyromegaly present  Cardiovascular: Normal rate and regular rhythm  Exam reveals no gallop and no friction rub  No murmur heard  Pulmonary/Chest: Effort normal and breath sounds normal  No respiratory distress  Abdominal: Soft  Bowel sounds are normal  He exhibits no distension and no mass  There is no abdominal tenderness  There is no guarding  Musculoskeletal: Normal range of motion  Comments: Left elbow swelling has significantly improved, patient can now minimally flex and extend his left forearm   Lymphadenopathy:     He has no cervical adenopathy  Neurological: He is alert and oriented to person, place, and time  No cranial nerve deficit  Skin: Skin is warm  No rash noted  No erythema  Psychiatric: His behavior is normal    Vitals reviewed  Discussion with Family:  No family members present at the time of my discharge evaluation    Discharge instructions/Information to patient and family:   See after visit summary for information provided to patient and family  Provisions for Follow-Up Care:  See after visit summary for information related to follow-up care and any pertinent home health orders  Disposition:     Home    For Discharges to [facility] SNF:   · Not Applicable to this Patient - Not Applicable to this Patient    Planned Readmission:    None     Discharge Statement:  I spent 45 minutes discharging the patient  This time was spent on the day of discharge   I had direct contact with the patient on the day of discharge  Greater than 50% of the total time was spent examining patient, answering all patient questions, arranging and discussing plan of care with patient as well as directly providing post-discharge instructions  Additional time then spent on discharge activities  Discharge Medications:  See after visit summary for reconciled discharge medications provided to patient and family        ** Please Note: This note has been constructed using a voice recognition system **

## 2020-08-06 NOTE — DISCHARGE INSTR - AVS FIRST PAGE
Dear Rishabh Power,     It was our pleasure to care for you here at [facility]  It is our hope that we were always able to exceed the expected standards for your care during your stay  You were hospitalized due to cellulitis/bursitis of the left elbow  You were cared for on the medical/surgical floor by Marii Helm MD with the [facility] who covers for your primary care physician (PCP), Lorenzo Mcgraw DO, while you were hospitalized  You were also seen by Dr Rodo Gr of [facility] orthopedics  If you have any questions or concerns related to this hospitalization, you may contact us at [phone]  For follow up as well as any medication refills, we recommend that you follow up with your primary care physician  A registered nurse will reach out to you by phone within a few days after your discharge to answer any additional questions that you may have after going home    However, at this time we provide for you here, the most important instructions / recommendations at discharge:     · Notable Medication Adjustments -   · Prescription has been provided for 12 days worth of doxycycline-please complete the course  · Prescription has been provided for Mobic - take 1 tablet daily  · Prescription has been provided for Percocet-take 1 tablet every 4 hours as needed for severe pain  · Testing Required after Discharge -   · To be further determined by Orthopedic surgery  · Important follow up information -   · Please ensure that you follow-up with your primary care physician, and with orthopedic surgery within a timely fashion  · Other Instructions -   · Please maintain a healthy diet  · Please review this entire after visit summary as additional general instructions including medication list, appointments, activity, diet, any pertinent wound care, and other additional recommendations from your care team that may be provided for you       Sincerely,     Wale Leung MD

## 2020-08-07 ENCOUNTER — TELEPHONE (OUTPATIENT)
Dept: OBGYN CLINIC | Facility: HOSPITAL | Age: 45
End: 2020-08-07

## 2020-08-07 NOTE — TELEPHONE ENCOUNTER
Called and left a message for pt  To set up a new pt  Appt  With Dr Precious Roy   Pt  Was seen in the ED and needs a follow up appt  For 10-14 days after his ED visit  Please schedule if pt  Calls back

## 2020-08-10 LAB
BACTERIA BLD CULT: NORMAL
BACTERIA BLD CULT: NORMAL

## 2022-06-08 ENCOUNTER — APPOINTMENT (EMERGENCY)
Dept: CT IMAGING | Facility: HOSPITAL | Age: 47
End: 2022-06-08
Payer: COMMERCIAL

## 2022-06-08 ENCOUNTER — TELEPHONE (OUTPATIENT)
Dept: CARDIAC SURGERY | Facility: CLINIC | Age: 47
End: 2022-06-08

## 2022-06-08 ENCOUNTER — APPOINTMENT (EMERGENCY)
Dept: RADIOLOGY | Facility: HOSPITAL | Age: 47
End: 2022-06-08
Payer: COMMERCIAL

## 2022-06-08 ENCOUNTER — HOSPITAL ENCOUNTER (EMERGENCY)
Facility: HOSPITAL | Age: 47
Discharge: HOME | End: 2022-06-08
Attending: EMERGENCY MEDICINE
Payer: COMMERCIAL

## 2022-06-08 VITALS
DIASTOLIC BLOOD PRESSURE: 86 MMHG | RESPIRATION RATE: 16 BRPM | HEART RATE: 56 BPM | HEIGHT: 78 IN | TEMPERATURE: 96.8 F | BODY MASS INDEX: 34.43 KG/M2 | WEIGHT: 297.6 LBS | SYSTOLIC BLOOD PRESSURE: 137 MMHG | OXYGEN SATURATION: 99 %

## 2022-06-08 DIAGNOSIS — I71.2 THORACIC AORTIC ANEURYSM (HCC): ICD-10-CM

## 2022-06-08 DIAGNOSIS — R07.9 CHEST PAIN: Primary | ICD-10-CM

## 2022-06-08 DIAGNOSIS — R06.02 SOB (SHORTNESS OF BREATH): ICD-10-CM

## 2022-06-08 LAB
ALBUMIN SERPL BCP-MCNC: 4.4 G/DL (ref 3.5–5)
ALP SERPL-CCNC: 57 U/L (ref 34–104)
ALT SERPL W P-5'-P-CCNC: 15 U/L (ref 7–52)
ANION GAP SERPL CALCULATED.3IONS-SCNC: 8 MMOL/L (ref 4–13)
AST SERPL W P-5'-P-CCNC: 17 U/L (ref 13–39)
ATRIAL RATE: 57 BPM
ATRIAL RATE: 62 BPM
BASOPHILS # BLD AUTO: 0.02 THOUSANDS/ΜL (ref 0–0.1)
BASOPHILS NFR BLD AUTO: 0 % (ref 0–1)
BILIRUB SERPL-MCNC: 0.64 MG/DL (ref 0.2–1)
BUN SERPL-MCNC: 18 MG/DL (ref 5–25)
CALCIUM SERPL-MCNC: 9.7 MG/DL (ref 8.4–10.2)
CARDIAC TROPONIN I PNL SERPL HS: <2 NG/L
CARDIAC TROPONIN I PNL SERPL HS: <2 NG/L
CHLORIDE SERPL-SCNC: 102 MMOL/L (ref 96–108)
CO2 SERPL-SCNC: 27 MMOL/L (ref 21–32)
CREAT SERPL-MCNC: 0.78 MG/DL (ref 0.6–1.3)
D DIMER PPP FEU-MCNC: 0.29 UG/ML FEU
EOSINOPHIL # BLD AUTO: 0.39 THOUSAND/ΜL (ref 0–0.61)
EOSINOPHIL NFR BLD AUTO: 5 % (ref 0–6)
ERYTHROCYTE [DISTWIDTH] IN BLOOD BY AUTOMATED COUNT: 12.2 % (ref 11.6–15.1)
FLUAV RNA RESP QL NAA+PROBE: NEGATIVE
FLUBV RNA RESP QL NAA+PROBE: NEGATIVE
GFR SERPL CREATININE-BSD FRML MDRD: 107 ML/MIN/1.73SQ M
GLUCOSE SERPL-MCNC: 99 MG/DL (ref 65–140)
HCT VFR BLD AUTO: 44.2 % (ref 36.5–49.3)
HGB BLD-MCNC: 14.4 G/DL (ref 12–17)
IMM GRANULOCYTES # BLD AUTO: 0.02 THOUSAND/UL (ref 0–0.2)
IMM GRANULOCYTES NFR BLD AUTO: 0 % (ref 0–2)
LIPASE SERPL-CCNC: 27 U/L (ref 11–82)
LYMPHOCYTES # BLD AUTO: 1.62 THOUSANDS/ΜL (ref 0.6–4.47)
LYMPHOCYTES NFR BLD AUTO: 22 % (ref 14–44)
MAGNESIUM SERPL-MCNC: 1.9 MG/DL (ref 1.9–2.7)
MCH RBC QN AUTO: 28.6 PG (ref 26.8–34.3)
MCHC RBC AUTO-ENTMCNC: 32.6 G/DL (ref 31.4–37.4)
MCV RBC AUTO: 88 FL (ref 82–98)
MONOCYTES # BLD AUTO: 0.64 THOUSAND/ΜL (ref 0.17–1.22)
MONOCYTES NFR BLD AUTO: 9 % (ref 4–12)
NEUTROPHILS # BLD AUTO: 4.75 THOUSANDS/ΜL (ref 1.85–7.62)
NEUTS SEG NFR BLD AUTO: 64 % (ref 43–75)
NRBC BLD AUTO-RTO: 0 /100 WBCS
P AXIS: 50 DEGREES
P AXIS: 52 DEGREES
PLATELET # BLD AUTO: 247 THOUSANDS/UL (ref 149–390)
PMV BLD AUTO: 9.8 FL (ref 8.9–12.7)
POTASSIUM SERPL-SCNC: 3.8 MMOL/L (ref 3.5–5.3)
PR INTERVAL: 206 MS
PR INTERVAL: 216 MS
PROT SERPL-MCNC: 7.3 G/DL (ref 6.4–8.4)
QRS AXIS: -21 DEGREES
QRS AXIS: -27 DEGREES
QRSD INTERVAL: 106 MS
QRSD INTERVAL: 106 MS
QT INTERVAL: 424 MS
QT INTERVAL: 446 MS
QTC INTERVAL: 430 MS
QTC INTERVAL: 434 MS
RBC # BLD AUTO: 5.04 MILLION/UL (ref 3.88–5.62)
RSV RNA RESP QL NAA+PROBE: NEGATIVE
SARS-COV-2 RNA RESP QL NAA+PROBE: NEGATIVE
SODIUM SERPL-SCNC: 137 MMOL/L (ref 135–147)
T WAVE AXIS: 46 DEGREES
T WAVE AXIS: 47 DEGREES
VENTRICULAR RATE: 57 BPM
VENTRICULAR RATE: 62 BPM
WBC # BLD AUTO: 7.44 THOUSAND/UL (ref 4.31–10.16)

## 2022-06-08 PROCEDURE — 83735 ASSAY OF MAGNESIUM: CPT | Performed by: EMERGENCY MEDICINE

## 2022-06-08 PROCEDURE — 93010 ELECTROCARDIOGRAM REPORT: CPT | Performed by: INTERNAL MEDICINE

## 2022-06-08 PROCEDURE — 99285 EMERGENCY DEPT VISIT HI MDM: CPT

## 2022-06-08 PROCEDURE — 85379 FIBRIN DEGRADATION QUANT: CPT | Performed by: EMERGENCY MEDICINE

## 2022-06-08 PROCEDURE — 80053 COMPREHEN METABOLIC PANEL: CPT | Performed by: EMERGENCY MEDICINE

## 2022-06-08 PROCEDURE — 0241U HB NFCT DS VIR RESP RNA 4 TRGT: CPT | Performed by: EMERGENCY MEDICINE

## 2022-06-08 PROCEDURE — 74174 CTA ABD&PLVS W/CONTRAST: CPT

## 2022-06-08 PROCEDURE — 71045 X-RAY EXAM CHEST 1 VIEW: CPT

## 2022-06-08 PROCEDURE — 83690 ASSAY OF LIPASE: CPT | Performed by: EMERGENCY MEDICINE

## 2022-06-08 PROCEDURE — 85025 COMPLETE CBC W/AUTO DIFF WBC: CPT | Performed by: EMERGENCY MEDICINE

## 2022-06-08 PROCEDURE — 71275 CT ANGIOGRAPHY CHEST: CPT

## 2022-06-08 PROCEDURE — G1004 CDSM NDSC: HCPCS

## 2022-06-08 PROCEDURE — 36415 COLL VENOUS BLD VENIPUNCTURE: CPT | Performed by: EMERGENCY MEDICINE

## 2022-06-08 PROCEDURE — 84484 ASSAY OF TROPONIN QUANT: CPT | Performed by: EMERGENCY MEDICINE

## 2022-06-08 PROCEDURE — 99285 EMERGENCY DEPT VISIT HI MDM: CPT | Performed by: EMERGENCY MEDICINE

## 2022-06-08 PROCEDURE — 96374 THER/PROPH/DIAG INJ IV PUSH: CPT

## 2022-06-08 PROCEDURE — 93005 ELECTROCARDIOGRAM TRACING: CPT

## 2022-06-08 PROCEDURE — 94640 AIRWAY INHALATION TREATMENT: CPT

## 2022-06-08 RX ORDER — IPRATROPIUM BROMIDE AND ALBUTEROL SULFATE 2.5; .5 MG/3ML; MG/3ML
3 SOLUTION RESPIRATORY (INHALATION) ONCE
Status: COMPLETED | OUTPATIENT
Start: 2022-06-08 | End: 2022-06-08

## 2022-06-08 RX ORDER — FENTANYL CITRATE 50 UG/ML
25 INJECTION, SOLUTION INTRAMUSCULAR; INTRAVENOUS ONCE
Status: COMPLETED | OUTPATIENT
Start: 2022-06-08 | End: 2022-06-08

## 2022-06-08 RX ADMIN — IPRATROPIUM BROMIDE AND ALBUTEROL SULFATE 3 ML: 2.5; .5 SOLUTION RESPIRATORY (INHALATION) at 06:37

## 2022-06-08 RX ADMIN — IOHEXOL 100 ML: 350 INJECTION, SOLUTION INTRAVENOUS at 07:50

## 2022-06-08 RX ADMIN — FENTANYL CITRATE 25 MCG: 50 INJECTION, SOLUTION INTRAMUSCULAR; INTRAVENOUS at 05:05

## 2022-06-08 NOTE — ED PROCEDURE NOTE
PROCEDURE  ECG 12 Lead Documentation Only    Date/Time: 6/8/2022 4:55 AM  Performed by: Namita Doyle MD  Authorized by: Namita Doyle MD     Indications / Diagnosis:  Cp   ECG reviewed by me, the ED Provider: yes    Patient location:  ED  Previous ECG:     Comparison to cardiac monitor: Yes    Interpretation:     Interpretation: normal    Rate:     ECG rate:  62    ECG rate assessment: normal    Rhythm:     Rhythm: sinus rhythm    Ectopy:     Ectopy: none    QRS:     QRS axis:  Normal    QRS intervals:  Normal  Conduction:     Conduction: abnormal      Abnormal conduction: 1st degree    ST segments:     ST segments:  Non-specific  T waves:     T waves: non-specific           Namita Doyle MD  06/08/22 8777

## 2022-06-08 NOTE — ED PROVIDER NOTES
History  Chief Complaint   Patient presents with    Shortness of Breath     Shortness of breath last few days  Today worse, used inhaler without relief  Chest feels tight  49-YEAR-OLD MALE  PMH:  CAD w/ MI several years ago      PT HAD SUDDEN SOB AND CHEST TIGHTNESS  PAIN IS RATED AS MODERATE TO SEVERE  DESCRIBED AS PRESSURE    PATIENT HAS NO SHORTNESS OF BREATH  NO COMPLAINTS OF DIAPHORESIS  HE DENIES ANY RADIATION OF PAIN TO THE JAW NECK OR THE BACK  PAIN DID RADIATE DOWN THE LEFT ARM      INTERVENTIONS: NONE    PATIENT DENIES ANY COUGH, NO FEVERS OR CHILLS  PT HAS HAD SEVERAL DAYS OF NASAL CONGESTION, HE FEELS THIS IS TYPICAL AND OFTEN OCCURS EVERY YEAR WITH SEASONAL ALLERGIES    VTE  RISK FACTORS:  NONE  NO HISTORY OF PE OR DVT  NO LONG CAR RIDES OR PLANE RIDES  NO IMMOBILIZATION  NO RECENT SURGERY OR TRAUMA  NO HEMOPTYSIS  OTHER ASSOCIATED SYMPTOMS:  NO ABDOMINAL PAIN  NO NAUSEA OR VOMITING  NO STOOL CHANGES  NO ACUTE BACK PAIN         History provided by:  Patient  Chest Pain  Pain location:  Substernal area  Pain quality: pressure    Pain radiates to:  L arm  Pain severity:  Severe  Onset quality:  Sudden  Chronicity:  New  Relieved by:  Nothing  Worsened by:  Nothing tried  Ineffective treatments:  None tried  Associated symptoms: shortness of breath    Associated symptoms: no back pain, no claudication, no cough, no diaphoresis, no dizziness, no fatigue, no fever, no headache, no heartburn, no lower extremity edema, no nausea, no near-syncope, no palpitations, no syncope, not vomiting and no weakness        Prior to Admission Medications   Prescriptions Last Dose Informant Patient Reported? Taking?    Cholecalciferol 1 25 MG (41376 UT) TABS   Yes No   Sig: take 1 capsule by mouth ONCE EVERY WEEK FOR 4 WEEKS THEN ONE CAPSULE ONCE EVERY MONTH   atorvastatin (LIPITOR) 80 mg tablet   Yes No   lisinopril (ZESTRIL) 10 mg tablet   Yes No   Sig: Take by mouth   meloxicam (MOBIC) 15 mg tablet Not Taking at Unknown time  No No   Sig: Take 1 tablet (15 mg total) by mouth daily   Patient not taking: Reported on 2022   pantoprazole (PROTONIX) 20 mg tablet Not Taking at Unknown time  Yes No   Sig: Take 20 mg by mouth daily   Patient not taking: Reported on 2022      Facility-Administered Medications: None       Past Medical History:   Diagnosis Date    Asthma     Hypertension     Testicular cancer ([facility] [address] )        Past Surgical History:   Procedure Laterality Date    HERNIA REPAIR      LYMPHADENECTOMY      ROTATOR CUFF REPAIR      [address] / TESTICULAR         Family History   Problem Relation Age of Onset    Cancer Mother     Hypertension Father     Thyroid disease Sister     No Known Problems Daughter      I have reviewed and agree with the history as documented  E-Cigarette/Vaping    E-Cigarette Use Never User      E-Cigarette/Vaping Substances    Nicotine No     THC No     CBD No     Flavoring No     Other No     Unknown No      Social History     Tobacco Use    Smoking status: Former Smoker     Packs/day: 0 50     Years: 1 00     Pack years: 0 50     Types: Cigarettes     Start date: 1992     Quit date: 10/5/1993     Years since quittin 6    Smokeless tobacco: Never Used   Vaping Use    Vaping Use: Never used   Substance Use Topics    Alcohol use: Yes     Comment: few beers a month    Drug use: Never       Review of Systems   Constitutional: Negative for chills, diaphoresis, fatigue and fever  Respiratory: Positive for shortness of breath  Negative for cough and stridor  Cardiovascular: Positive for chest pain  Negative for palpitations, claudication, leg swelling, syncope and near-syncope  Gastrointestinal: Negative for blood in stool, heartburn, nausea and vomiting  Genitourinary: Negative for difficulty urinating, dysuria, flank pain and frequency     Musculoskeletal: Negative for arthralgias, back pain, gait problem, joint swelling, myalgias and neck stiffness  Skin: Negative for wound  Neurological: Negative for dizziness, weakness, light-headedness and headaches  All other systems reviewed and are negative  Physical Exam  Physical Exam  Constitutional:       General: He is not in acute distress  Appearance: He is well-developed  He is not ill-appearing, toxic-appearing or diaphoretic  HENT:      Head: Normocephalic and atraumatic  Nose: Nose normal       Mouth/Throat:      Pharynx: No oropharyngeal exudate  Eyes:      General: No scleral icterus  Right eye: No discharge  Left eye: No discharge  Conjunctiva/sclera: Conjunctivae normal       Pupils: Pupils are equal, round, and reactive to light  Neck:      Vascular: No JVD  Trachea: No tracheal deviation  Cardiovascular:      Rate and Rhythm: Normal rate and regular rhythm  Heart sounds: Normal heart sounds  No murmur heard  No friction rub  No gallop  Pulmonary:      Effort: Pulmonary effort is normal  No tachypnea, accessory muscle usage or respiratory distress  Breath sounds: Normal breath sounds  No stridor  No wheezing, rhonchi or rales  Chest:      Chest wall: No tenderness  Abdominal:      General: Bowel sounds are normal  There is no distension  Palpations: Abdomen is soft  There is no mass  Tenderness: There is no abdominal tenderness  There is no guarding or rebound  Hernia: No hernia is present  Musculoskeletal:         General: No tenderness or deformity  Normal range of motion  Cervical back: Normal range of motion and neck supple  Lymphadenopathy:      Cervical: No cervical adenopathy  Skin:     General: Skin is warm  Capillary Refill: Capillary refill takes less than 2 seconds  Coloration: Skin is not cyanotic or pale  Findings: No ecchymosis, erythema or rash  Neurological:      Mental Status: He is alert and oriented to person, place, and time        Cranial Nerves: No cranial nerve deficit  Sensory: No sensory deficit  Motor: No abnormal muscle tone  Coordination: Coordination normal    Psychiatric:         Behavior: Behavior normal          Thought Content: Thought content normal          Judgment: Judgment normal          Vital Signs  ED Triage Vitals [06/08/22 0439]   Temperature Pulse Respirations Blood Pressure SpO2   (!) 96 8 °F (36 °C) 56 16 158/88 100 %      Temp Source Heart Rate Source Patient Position - Orthostatic VS BP Location FiO2 (%)   Tympanic Monitor Sitting Right arm --      Pain Score       No Pain           Vitals:    06/08/22 0439 06/08/22 0500 06/08/22 0543   BP: 158/88 144/85 132/84   Pulse: 56 64    Patient Position - Orthostatic VS: Sitting           Visual Acuity      ED Medications  Medications   ipratropium-albuterol (DUO-NEB) 0 5-2 5 mg/3 mL inhalation solution 3 mL (has no administration in time range)   fentanyl citrate (PF) 100 MCG/2ML 25 mcg (25 mcg Intravenous Given 6/8/22 0505)       Diagnostic Studies  Results Reviewed     Procedure Component Value Units Date/Time    COVID/FLU/RSV - 2 hour TAT [[id number]]  (Normal) Collected: 06/08/22 0500    Lab Status: Final result Specimen: Nares from Nasopharyngeal Swab Updated: 06/08/22 0545     SARS-CoV-2 Negative     INFLUENZA A PCR Negative     INFLUENZA B PCR Negative     RSV PCR Negative    Narrative:      FOR PEDIATRIC PATIENTS - copy/paste COVID Guidelines URL to browser: [email or web address] [email or web address]  ashx    SARS-CoV-2 assay is a Nucleic Acid Amplification assay intended for the  qualitative detection of nucleic acid from SARS-CoV-2 in nasopharyngeal  swabs  Results are for the presumptive identification of SARS-CoV-2 RNA  Positive results are indicative of infection with SARS-CoV-2, the virus  causing COVID-19, but do not rule out bacterial infection or co-infection  with other viruses   Laboratories within the [place] and its  territories are required to report all positive results to the appropriate  public health authorities  Negative results do not preclude SARS-CoV-2  infection and should not be used as the sole basis for treatment or other  patient management decisions  Negative results must be combined with  clinical observations, patient history, and epidemiological information  This test has not been FDA cleared or approved  This test has been authorized by FDA under an Emergency Use Authorization  (EUA)  This test is only authorized for the duration of time the  declaration that circumstances exist justifying the authorization of the  emergency use of an in vitro diagnostic tests for detection of SARS-CoV-2  virus and/or diagnosis of COVID-19 infection under section 564(b)(1) of  the Act, 21 U  S C  064ZLX-1(N)(5), unless the authorization is terminated  or revoked sooner  The test has been validated but independent review by FDA  and CLIA is pending  Test performed using [employer] GeneXpert: This RT-PCR assay targets N2,  a region unique to SARS-CoV-2  A conserved region in the E-gene was chosen  for pan-Sarbecovirus detection which includes SARS-CoV-2      HS Troponin I 2hr [[id number]]     Lab Status: No result Specimen: Blood     HS Troponin 0hr (reflex protocol) [[id number]]  (Normal) Collected: 06/08/22 0502    Lab Status: Final result Specimen: Blood from Arm, Right Updated: 06/08/22 0532     hs TnI 0hr <2 ng/L     CMP [[id number]] Collected: 06/08/22 0500    Lab Status: Final result Specimen: Blood from Arm, Right Updated: 06/08/22 0525     Sodium 137 mmol/L      Potassium 3 8 mmol/L      Chloride 102 mmol/L      CO2 27 mmol/L      ANION GAP 8 mmol/L      BUN 18 mg/dL      Creatinine 0 78 mg/dL      Glucose 99 mg/dL      Calcium 9 7 mg/dL      AST 17 U/L      ALT 15 U/L      Alkaline Phosphatase 57 U/L      Total Protein 7 3 g/dL      Albumin 4 4 g/dL      Total Bilirubin 0 64 mg/dL      eGFR 107 ml/min/1 73sq m Narrative:      National Kidney Disease Foundation guidelines for Chronic Kidney Disease (CKD):     Stage 1 with normal or high GFR (GFR > 90 mL/min/1 73 square meters)    Stage 2 Mild CKD (GFR = 60-89 mL/min/1 73 square meters)    Stage 3A Moderate CKD (GFR = 45-59 mL/min/1 73 square meters)    Stage 3B Moderate CKD (GFR = 30-44 mL/min/1 73 square meters)    Stage 4 Severe CKD (GFR = 15-29 mL/min/1 73 square meters)    Stage 5 End Stage CKD (GFR <15 mL/min/1 73 square meters)  Note: GFR calculation is accurate only with a steady state creatinine    Lipase [[id number]]  (Normal) Collected: 06/08/22 0500    Lab Status: Final result Specimen: Blood from Arm, Right Updated: 06/08/22 0525     Lipase 27 u/L     Magnesium [[id number]]  (Normal) Collected: 06/08/22 0500    Lab Status: Final result Specimen: Blood from Arm, Right Updated: 06/08/22 0525     Magnesium 1 9 mg/dL     D-Dimer [[id number]]  (Normal) Collected: 06/08/22 0502    Lab Status: Final result Specimen: Blood from Arm, Right Updated: 06/08/22 0523     D-Dimer, Quant 0 29 ug/ml FEU     CBC and differential [[id number]] Collected: 06/08/22 0501    Lab Status: Final result Specimen: Blood from Arm, Right Updated: 06/08/22 0508     WBC 7 44 Thousand/uL      RBC 5 04 Million/uL      Hemoglobin 14 4 g/dL      Hematocrit 44 2 %      MCV 88 fL      MCH 28 6 pg      MCHC 32 6 g/dL      RDW 12 2 %      MPV 9 8 fL      Platelets 053 Thousands/uL      nRBC 0 /100 WBCs      Neutrophils Relative 64 %      Immat GRANS % 0 %      Lymphocytes Relative 22 %      Monocytes Relative 9 %      Eosinophils Relative 5 %      Basophils Relative 0 %      Neutrophils Absolute 4 75 Thousands/µL      Immature Grans Absolute 0 02 Thousand/uL      Lymphocytes Absolute 1 62 Thousands/µL      Monocytes Absolute 0 64 Thousand/µL      Eosinophils Absolute 0 39 Thousand/µL      Basophils Absolute 0 02 Thousands/µL                  XR chest 1 view portable   ED Interpretation by Albina Hua Leo Bond MD (06/08 0541)   COMPARISON: NONE    EXAM PERFORMED/VIEWS:  XR CHEST Portable         FINDINGS:     Cardiomediastinal silhouette appears unremarkable      The lungs are clear  No pneumothorax or pleural effusion      Visualized osseous structures appear unremarkable for the patient's age      IMPRESSION:     No focal consolidation, pleural effusion, or pneumothorax                             Procedures  Procedures         ED Course  ED Course as of 06/08/22 0626   Wed Jun 08, 2022   0500 Pt seen and evaluated    Here for acute cp/sob  H/o CAD w/ MI several years ago  EKG is non ischemic    0540 D-Dimer, Quant: 0 29   0540 CMP   0540 hs TnI 0hr: <2   0540 Lipase: 27   0540 Magnesium: 1 9   0540 CBC and differential   0543 Pt stable appearing  Pt understands results of work up    0550 COVID/FLU/RSV - 2 hour TAT   0610 Pt remains stable    0612 Sign out:     Dr Jill Coppola to f/u on Delta trop and assist w/ disposition              HEART Risk Score    Flowsheet Row Most Recent Value   Heart Score Risk Calculator    History 1 Filed at: 06/08/2022 0542   ECG 1 Filed at: 06/08/2022 0542   Age 1 Filed at: 06/08/2022 0542   Risk Factors 2 Filed at: 06/08/2022 0542   Troponin 0 Filed at: 06/08/2022 0542   HEART Score 5 Filed at: 06/08/2022 0542                        SBIRT 20yo+    Flowsheet Row Most Recent Value   SBIRT (25 yo +)    In order to provide better care to our patients, we are screening all of our patients for alcohol and drug use  Would it be okay to ask you these screening questions? Yes Filed at: 06/08/2022 0068   Initial Alcohol Screen: US AUDIT-C     1  How often do you have a drink containing alcohol? 0 Filed at: 06/08/2022 0526   2  How many drinks containing alcohol do you have on a typical day you are drinking? 0 Filed at: 06/08/2022 0526   3a  Male UNDER 65: How often do you have five or more drinks on one occasion? 0 Filed at: 06/08/2022 0526   3b  FEMALE Any Age, or MALE 65+:  How often do you have 4 or more drinks on one occassion? 0 Filed at: 06/08/2022 0526   Audit-C Score 0 Filed at: 06/08/2022 4067   HENOK: How many times in the past year have you    Used an illegal drug or used a prescription medication for non-medical reasons? Never Filed at: 06/08/2022 7954                    MDM    Disposition  Final diagnoses:   Chest pain   SOB (shortness of breath)     Time reflects when diagnosis was documented in both MDM as applicable and the Disposition within this note     Time User Action Codes Description Comment    6/8/2022  5:43 AM Satira Malou Add [R07 9] Chest pain     6/8/2022  5:43 AM Satira Malou Add [R06 02] SOB (shortness of breath)       ED Disposition     None      Follow-up Information    None         Patient's Medications   Discharge Prescriptions    No medications on file       No discharge procedures on file      PDMP Review     None          ED Provider  Electronically Signed by           Patricia Cleaning MD  06/08/22 6918

## 2022-06-08 NOTE — DISCHARGE INSTRUCTIONS
RETURN IF WORSE IN ANY WAY:   RETURN OF CHEST PAIN OR SHORTNESS OF BREATH,   FEVER OR FLU LIKE SYMPTOMS,   OR NEW AND CONCERNING SYMPTOMS SIGNS OR SYMPTOMS      PLEASE CALL YOUR PRIMARY DOCTOR IN THE MORNING TO SET UP FOLLOW UP   PLEASE REVIEW THE WORK UP RESULTS WITH YOUR DOCTOR      Your CT of the chest / abdomen / pelvis showed: No acute pathology  No aortic dissection  4 3 cm ectasia of the ascending aorta  Recommendation is for surveillance follow-up chest CT in 12 months

## 2022-06-08 NOTE — ED PROCEDURE NOTE
PROCEDURE  ECG 12 Lead Documentation Only    Date/Time: 6/8/2022 6:59 AM  Performed by: Kirsten Yates DO  Authorized by: Fredi Garces III, DO     Indications / Diagnosis:  Sob  ECG reviewed by me, the ED Provider: yes    Patient location:  ED  Previous ECG:     Previous ECG:  Compared to current  Comments:      I personally reviewed this EKG is performed the patient June 8, 2020 to come EKG was completed at 6:59 a m  and interpreted by me at 6:59 a m , sinus bradycardia with no acute ST abnormalities, T-wave flattening known ear anterior lateral leads  No diffuse elevations to indicate pericarditis  No coved ST elevations greater than 2mm with negative T waves in V1-3 to indicate concern for brugada  No biphasic T waves in V2, V3 to indicate Wellens (critical stenosis of LAD)  No elevation in aVR or deviation when compared to V1 (can be associated with ST depression in I,II, V4-6 when left main occlusion is present)           Fredi Garces III, DO  06/08/22 8697

## 2022-06-08 NOTE — ED CARE HANDOFF
Emergency Department Sign Out Note        Sign out and transfer of care from Dr Johnathan Pope See Separate Emergency Department note  The patient, Nancy Curry, was evaluated by the previous provider for Dr Johnathan Pope  Workup Completed:  CXR, LABS, EKG    ED Course / Workup Pending (followup):  REPEAT TROPONIN      HEART Risk Score    Flowsheet Row Most Recent Value   Heart Score Risk Calculator    History 1 Filed at: 06/08/2022 0542   ECG 1 Filed at: 06/08/2022 0542   Age 1 Filed at: 06/08/2022 0542   Risk Factors 2 Filed at: 06/08/2022 0542   Troponin 0 Filed at: 06/08/2022 0542   HEART Score 5 Filed at: 06/08/2022 0542                                  ED Course as of 06/08/22 0948   Wed Jun 08, 2022   0636 Received signout out from Dr Johnathan Pope  6306 Patient seen and examined, still having nebulizer treatment currently  Will reassess when completed  1996 Review of CT imaging from [facility]: 2017: No evidence of aortic dissection or intramural hematoma  Small, 4 1 cm   aneurysm of the ascending thoracic aorta is unchanged from the prior   examination  9684 CTA of chest resulted: No acute pathology  No aortic dissection  4 3 cm ectasia of the ascending aorta  Recommendation is for surveillance follow-up chest CT in 12 months      Colonic diverticulosis without diverticulitis  2817 Reviewed discharge instructions with the patient, patient requesting to be seen within the [facility] system, requesting to transfer his PCP to local provider within her network and also the follow-up for his incidental finding of his thoracic aneurysm within the [facility] system  Referrals will be made on the patient's behalf  Procedures  MDM    This is a very pleasant, nontoxic, 50-year-old gentleman presents emergency department with shortness of breath are and some upper back pain at 3:00 a m  this morning, 2 sets of cardiac enzymes unremarkable, EKG unremarkable    Reviewed the chart noted in 2017 patient was admitted to the hospital at [facility] for a chest pain rule out, cardiac catheterization negative with no intervention, enzymes which were does surely negative, nuclear stress test negative  Incidentally on a CT of the chest in 2017 noted the patient had a 4 1 cm aneurysm of the ascending thoracic aorta, CT of the chest abdomen pelvis today showed the patient had a 4 3 cm lasting ascending aorta, no evidence of dissection on dissection protocol study, patient be referred as an outpatient to CT surgery for further evaluation and serial surveillance, patient is made aware of this finding  Patient also requested a transfer of care from [facility] to a sitting position, employed referral made on the patient's behalf to [facility]  Patient stable for discharge  Portions of the record may have been created with voice recognition software  Occasional wrong word or "sound a like" substitutions may have occurred due to the inherent limitations of voice recognition software  Read the chart carefully and recognize, using context, where substitutions have occurred  Counseling: I had a detailed discussion with the patient and/or guardian regarding: the historical points, exam findings, and any diagnostic results supporting the discharge diagnosis, lab results, radiology results, discharge instructions reviewed with patient and/or family/caregiver and understanding was verbalized  Instructions given to return to the emergency department if symptoms worsen or persist, or if there are any questions or concerns that arise at home       Disposition  Final diagnoses:   Chest pain   SOB (shortness of breath)   Thoracic aortic aneurysm [facility])     Time reflects when diagnosis was documented in both MDM as applicable and the Disposition within this note     Time User Action Codes Description Comment    6/8/2022  5:43 AM Lillian Steven Add [R07 9] Chest pain     6/8/2022  5:43 AM Lillian Steven Add [R06 02] SOB (shortness of breath)     6/8/2022  8:50 AM Khoi Howe Add [I71 2] Thoracic aortic aneurysm [facility])       ED Disposition     ED Disposition   Discharge    Condition   Stable    Date/Time   Wed Jun 8, 2022  8:45 AM    Comment   Marco Valdes discharge to home/self care                 Follow-up Information     Follow up With Specialties Details Why Contact Info    Jimmy Borrego DO Family Medicine Schedule an appointment as soon as possible for a visit   [address] [id number]  [address] [facility], [address] [place] [address]  [phone]          Discharge Medication List as of 6/8/2022  8:54 AM      CONTINUE these medications which have NOT CHANGED    Details   atorvastatin (LIPITOR) 80 mg tablet Historical Med      Cholecalciferol 1 25 MG (72844 UT) TABS take 1 capsule by mouth ONCE EVERY WEEK FOR 4 WEEKS THEN ONE CAPSULE ONCE EVERY MONTH, Historical Med      lisinopril (ZESTRIL) 10 mg tablet Take by mouth, Historical Med      meloxicam (MOBIC) 15 mg tablet Take 1 tablet (15 mg total) by mouth daily, Starting Fri 8/7/2020, Normal      pantoprazole (PROTONIX) 20 mg tablet Take 20 mg by mouth daily, Historical Med                  ED Provider  Electronically Signed by     Omer Dotson DO  06/08/22 5059

## 2022-06-08 NOTE — TELEPHONE ENCOUNTER
Called patient and left message requesting a call back to schedule a consultation appointment with Dr Newton Shallow

## 2022-06-13 ENCOUNTER — OFFICE VISIT (OUTPATIENT)
Dept: CARDIAC SURGERY | Facility: CLINIC | Age: 47
End: 2022-06-13
Payer: COMMERCIAL

## 2022-06-13 VITALS
SYSTOLIC BLOOD PRESSURE: 121 MMHG | DIASTOLIC BLOOD PRESSURE: 80 MMHG | WEIGHT: 297 LBS | HEIGHT: 78 IN | OXYGEN SATURATION: 97 % | RESPIRATION RATE: 20 BRPM | BODY MASS INDEX: 34.36 KG/M2 | HEART RATE: 77 BPM

## 2022-06-13 DIAGNOSIS — R07.9 CHEST PAIN, UNSPECIFIED TYPE: Primary | ICD-10-CM

## 2022-06-13 DIAGNOSIS — I71.2 THORACIC AORTIC ANEURYSM (HCC): ICD-10-CM

## 2022-06-13 PROBLEM — I71.20 THORACIC AORTIC ANEURYSM: Status: ACTIVE | Noted: 2022-06-13

## 2022-06-13 PROCEDURE — 99204 OFFICE O/P NEW MOD 45 MIN: CPT | Performed by: NURSE PRACTITIONER

## 2022-06-13 NOTE — PROGRESS NOTES
Consultation - Cardiothoracic Surgery   Carlos Herrera 51 y o  male MRN: [id number]    Physician Requesting Consult: Koko Galarza III, DO    Reason for Consult / Principal Problem: Ascending aortic aneurysm    History of Present Illness: Carlos Herrera is a 51y o  year old male referred for consultation for ascending aortic dilatation noted on recent CTA  Patient;s PMHx is notable for  HTN, HLD, (+) FH premature CAD, (?) h/o MI 2017, asthma, h/o testicular cancer s/p orchiectomy (1993) & chemo rx and lumbar radiculopathy  Patient was seen in the ED at [facility] for sudden onset CP & SOB  Troponin & ECG  Negative  CTA (-) for PE or aortic dissection; incidental finding of 43 mm diameter ascending aorta  Patient has several CT scans performed at [facility] (2012, 2014, 2015, 2016 & 2017)- only report available by accessing "Care Everywhere " CT chest 2017 demonstrates 41 mm ascending aorta  Upon interview patient reports several episodes of chest pain over the years  He follows with [facility]  He states he was told by EMS in 2017 while being transported to the hospital that he had an MI  He has never had a cardiac cath  He reports his chest pain occurs generally with exertion  He denies palpitations, lightheadedness, syncope or diaphoresis  He is a non-smoker, non-drinker, denies drug use  He works as a truck/  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: DAD with MI at age 38  Patient is 6'9" 297 lb  His sister and mother are both extremely tall as well        Past Medical History:  Past Medical History:   Diagnosis Date    Asthma     Hypertension     Testicular cancer ([facility] [address] )          Past Surgical History:   Past Surgical History:   Procedure Laterality Date    HERNIA REPAIR      LYMPHADENECTOMY      ORCHIECTOMY      and LN dissection    ROTATOR CUFF REPAIR           Family History:  Family History   Problem Relation Age of Onset    Cancer Mother     Hypertension Father    Zuleima Augie Thyroid disease Sister     No Known Problems Daughter          Social History:    Social History     Substance and Sexual Activity   Alcohol Use Yes    Comment: few beers a or 2 month     Social History     Substance and Sexual Activity   Drug Use Never     Social History     Tobacco Use   Smoking Status Former Smoker    Packs/day: 0 50    Years: 1 00    Pack years: 0 50    Types: Cigarettes    Start date: 1992   Zac Bones Quit date: 10/5/1993    Years since quittin 7   Smokeless Tobacco Never Used       Home Medications:   Prior to Admission medications    Medication Sig Start Date End Date Taking? Authorizing Provider   atorvastatin (LIPITOR) 80 mg tablet    Yes Historical Provider, MD   Cholecalciferol 1 25 MG (62747 UT) TABS take 1 capsule by mouth ONCE EVERY WEEK FOR 4 WEEKS THEN ONE CAPSULE ONCE EVERY MONTH 20  Yes Historical Provider, MD   lisinopril (ZESTRIL) 10 mg tablet Take 40 mg by mouth   Yes Historical Provider, MD   meloxicam (MOBIC) 15 mg tablet Take 1 tablet (15 mg total) by mouth daily  Patient not taking: No sig reported 20   Jayden Chu MD   pantoprazole (PROTONIX) 20 mg tablet Take 20 mg by mouth daily  Patient not taking: No sig reported    Historical Provider, MD       Allergies:   Allergies   Allergen Reactions    Ibuprofen Other (See Comments) and GI Bleeding     Other reaction(s): severe chest pain  800mg  800mg  Other reaction(s): Free Text  800mg    Cat Hair Extract Wheezing    Grass Extracts [Gramineae Pollens] Headache     Eyes water    Lidocaine Hives     rash    Pollen Extract Sneezing    Bee Pollen Rash    Dust Mite Extract Sneezing    Mold Extract [Trichophyton] Sneezing       Review of Systems:  Review of Systems - History obtained from chart review and the patient  General ROS: negative  Psychological ROS: negative  Ophthalmic ROS: negative  ENT ROS: negative  Allergy and Immunology ROS: negative  Hematological and Lymphatic ROS: negative  Endocrine ROS: negative  Breast ROS: negative  Respiratory ROS: positive for - shortness of breath  negative for - cough, hemoptysis, orthopnea or pleuritic pain  Cardiovascular ROS: positive for - chest pain and dyspnea on exertion  negative for - edema, irregular heartbeat, loss of consciousness, murmur, orthopnea, palpitations, paroxysmal nocturnal dyspnea or rapid heart rate  Gastrointestinal ROS: no abdominal pain, change in bowel habits, or black or bloody stools  Genito-Urinary ROS: no dysuria, trouble voiding, or hematuria  Musculoskeletal ROS: positive for - low back pain  Neurological ROS: no TIA or stroke symptoms  Dermatological ROS: negative    Vital Signs:     Vitals:    06/13/22 1425 06/13/22 1427   BP: 134/77 121/80   BP Location: Left arm Right arm   Patient Position: Sitting Sitting   Cuff Size: Standard Standard   Pulse: 77    Resp: 20    SpO2: 97%    Weight: 135 kg (297 lb)    Height: 6' 9" (2 057 m)        Physical Exam:    General: Alert,orienetd, extremely tall, no acute distress  HEENT/NECK:  PERRLA  No jugular venous distention  Cardiac:Regular rate and rhythm, no murmurs rubs or gallops  Carotid arteries: 2+ pulses, no bruits  Pulmonary:  Breath sounds clear bilaterally  Abdomen:  Non-tender, Non-distended  Positive bowel sounds  Upper extremities: 2+ radial pulses; brisk capillary refill  Lower extremities: Extremities warm/dry  PT/DP pulses 2+ bilaterally  No edema B/L  Neuro: Alert and oriented X 3  Sensation is grossly intact  No focal deficits  Musculoskeletal: MAEE, stable gait  Skin: Warm/Dry, without rashes or lesions      Lab Results:     Results from last 7 days   Lab Units 06/08/22  0501   WBC Thousand/uL 7 44   HEMOGLOBIN g/dL 14 4   HEMATOCRIT % 44 2   PLATELETS Thousands/uL 247     Results from last 7 days   Lab Units 06/08/22  0500   POTASSIUM mmol/L 3 8   CHLORIDE mmol/L 102   CO2 mmol/L 27   BUN mg/dL 18   CREATININE mg/dL 0 78   CALCIUM mg/dL 9 7 Imaging Studies:     CTA c/a/p: 6/8/22    No acute pathology  No aortic dissection  4 3 cm ectasia of the ascending aorta  I have personally reviewed pertinent films in PACS     PCP and Cardiology notes reviewed     Assessment:  Patient Active Problem List    Diagnosis Date Noted    Thoracic aortic aneurysm ([facility] [address] ) 06/13/2022    Olecranon bursitis of left elbow 08/04/2020    Essential hypertension 08/04/2020    Mixed hyperlipidemia 08/04/2020         Impression/Plan:    43 mm ascending aortic aneurysm which is not concerning in a male that is 6'9" in height and weight 297 lb  He has previous imaging in 2017 performed at [facility] demonstrating 41 mm ascending aorta  He does not meet surgical criteria  This finding is likely not the etiology of his chest pain as there is no rupture, dissection or IMH noted on his CTA  Given his (+) FH for premature CAD and personal hx of HTN and HLD, we will refer his to [facility] for workup of his chest pain  We will plan to see him back in our aortic clinic in 1 year with repeat non-contrast chest CT scan and echocardiogram for routine aortic surveillance  Ken Riedel was comfortable with our recommendations, and their questions were answered to their satisfaction  Thank you for allowing us to participate in the care of this patient         SIGNATURE: ALEX Recinos  DATE: June 13, 2022  TIME: 2:51 PM

## 2022-06-13 NOTE — LETTER
June 13, 2022     Adilson Wright, [address] [facility] [address] [place] [id number]    Patient: Kp Ann   YOB: 1975   Date of Visit: 6/13/2022       Dear Dr HARTMANN: Thank you for referring Kp Ann to me for evaluation  Below are my notes for this consultation  If you have questions, please do not hesitate to call me  I look forward to following your patient along with you  Sincerely,        Ebonie Enriquez DO        CC: DO Katina Fitch CRNP  6/13/2022  3:23 PM  Attested  Consultation - Cardiothoracic Surgery   Kp Ann 51 y o  male MRN: [id number]    Physician Requesting Consult: Pete Azar III, DO    Reason for Consult / Principal Problem: Ascending aortic aneurysm    History of Present Illness: Kp Ann is a 51y o  year old male referred for consultation for ascending aortic dilatation noted on recent CTA  Patient;s PMHx is notable for  HTN, HLD, (+) FH premature CAD, (?) h/o MI 2017, asthma, h/o testicular cancer s/p orchiectomy (1993) & chemo rx and lumbar radiculopathy  Patient was seen in the ED at [facility] for sudden onset CP & SOB  Troponin & ECG  Negative  CTA (-) for PE or aortic dissection; incidental finding of 43 mm diameter ascending aorta  Patient has several CT scans performed at [facility] (2012, 2014, 2015, 2016 & 2017)- only report available by accessing "Care Everywhere " CT chest 2017 demonstrates 41 mm ascending aorta  Upon interview patient reports several episodes of chest pain over the years  He follows with [facility]  He states he was told by EMS in 2017 while being transported to the hospital that he had an MI  He has never had a cardiac cath  He reports his chest pain occurs generally with exertion  He denies palpitations, lightheadedness, syncope or diaphoresis  He is a non-smoker, non-drinker, denies drug use  He works as a truck/    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: DAD with MI at age 38  Patient is 6'9" 297 lb  His sister and mother are both extremely tall as well  Past Medical History:  Past Medical History:   Diagnosis Date    Asthma     Hypertension     Testicular cancer ([address] )          Past Surgical History:   Past Surgical History:   Procedure Laterality Date    HERNIA REPAIR      LYMPHADENECTOMY      ORCHIECTOMY      and LN dissection    ROTATOR CUFF REPAIR           Family History:  Family History   Problem Relation Age of Onset   Chavez Romano Cancer Mother     Hypertension Father     Thyroid disease Sister     No Known Problems Daughter          Social History:    Social History     Substance and Sexual Activity   Alcohol Use Yes    Comment: few beers a or 2 month     Social History     Substance and Sexual Activity   Drug Use Never     Social History     Tobacco Use   Smoking Status Former Smoker    Packs/day: 0 50    Years: 1 00    Pack years: 0 50    Types: Cigarettes    Start date: 1992   Chavez Romano Quit date: 10/5/1993    Years since quittin 7   Smokeless Tobacco Never Used       Home Medications:   Prior to Admission medications    Medication Sig Start Date End Date Taking? Authorizing Provider   atorvastatin (LIPITOR) 80 mg tablet    Yes Historical Provider, MD   Cholecalciferol 1 25 MG (61176 UT) TABS take 1 capsule by mouth ONCE EVERY WEEK FOR 4 WEEKS THEN ONE CAPSULE ONCE EVERY MONTH 20  Yes Historical Provider, MD   lisinopril (ZESTRIL) 10 mg tablet Take 40 mg by mouth   Yes Historical Provider, MD   meloxicam (MOBIC) 15 mg tablet Take 1 tablet (15 mg total) by mouth daily  Patient not taking: No sig reported 20   Aaron Owens MD   pantoprazole (PROTONIX) 20 mg tablet Take 20 mg by mouth daily  Patient not taking: No sig reported    Historical Provider, MD       Allergies:   Allergies   Allergen Reactions    Ibuprofen Other (See Comments) and GI Bleeding     Other reaction(s): severe chest pain  800mg  800mg  Other reaction(s): Free Text  800mg    Cat Hair Extract Wheezing    Grass Extracts [Gramineae Pollens] Headache     Eyes water    Lidocaine Hives     rash    Pollen Extract Sneezing    Bee Pollen Rash    Dust Mite Extract Sneezing    Mold Extract [Trichophyton] Sneezing       Review of Systems:  Review of Systems - History obtained from chart review and the patient  General ROS: negative  Psychological ROS: negative  Ophthalmic ROS: negative  ENT ROS: negative  Allergy and Immunology ROS: negative  Hematological and Lymphatic ROS: negative  Endocrine ROS: negative  Breast ROS: negative  Respiratory ROS: positive for - shortness of breath  negative for - cough, hemoptysis, orthopnea or pleuritic pain  Cardiovascular ROS: positive for - chest pain and dyspnea on exertion  negative for - edema, irregular heartbeat, loss of consciousness, murmur, orthopnea, palpitations, paroxysmal nocturnal dyspnea or rapid heart rate  Gastrointestinal ROS: no abdominal pain, change in bowel habits, or black or bloody stools  Genito-Urinary ROS: no dysuria, trouble voiding, or hematuria  Musculoskeletal ROS: positive for - low back pain  Neurological ROS: no TIA or stroke symptoms  Dermatological ROS: negative    Vital Signs:     Vitals:    06/13/22 1425 06/13/22 1427   BP: 134/77 121/80   BP Location: Left arm Right arm   Patient Position: Sitting Sitting   Cuff Size: Standard Standard   Pulse: 77    Resp: 20    SpO2: 97%    Weight: 135 kg (297 lb)    Height: 6' 9" (2 057 m)        Physical Exam:    General: Alert,orienetd, extremely tall, no acute distress  HEENT/NECK:  PERRLA  No jugular venous distention  Cardiac:Regular rate and rhythm, no murmurs rubs or gallops  Carotid arteries: 2+ pulses, no bruits  Pulmonary:  Breath sounds clear bilaterally  Abdomen:  Non-tender, Non-distended  Positive bowel sounds  Upper extremities: 2+ radial pulses; brisk capillary refill  Lower extremities: Extremities warm/dry   PT/DP pulses 2+ bilaterally  No edema B/L  Neuro: Alert and oriented X 3  Sensation is grossly intact  No focal deficits  Musculoskeletal: MAEE, stable gait  Skin: Warm/Dry, without rashes or lesions  Lab Results:     Results from last 7 days   Lab Units 06/08/22  0501   WBC Thousand/uL 7 44   HEMOGLOBIN g/dL 14 4   HEMATOCRIT % 44 2   PLATELETS Thousands/uL 247     Results from last 7 days   Lab Units 06/08/22  0500   POTASSIUM mmol/L 3 8   CHLORIDE mmol/L 102   CO2 mmol/L 27   BUN mg/dL 18   CREATININE mg/dL 0 78   CALCIUM mg/dL 9 7       Imaging Studies:     CTA c/a/p: 6/8/22    No acute pathology  No aortic dissection  4 3 cm ectasia of the ascending aorta  I have personally reviewed pertinent films in PACS     PCP and Cardiology notes reviewed     Assessment:  Patient Active Problem List    Diagnosis Date Noted    Thoracic aortic aneurysm ([facility] [address] ) 06/13/2022    Olecranon bursitis of left elbow 08/04/2020    Essential hypertension 08/04/2020    Mixed hyperlipidemia 08/04/2020         Impression/Plan:    43 mm ascending aortic aneurysm which is not concerning in a male that is 6'9" in height and weight 297 lb  He has previous imaging in 2017 performed at [facility] demonstrating 41 mm ascending aorta  He does not meet surgical criteria  This finding is likely not the etiology of his chest pain as there is no rupture, dissection or IMH noted on his CTA  Given his (+) FH for premature CAD and personal hx of HTN and HLD, we will refer his to [facility] for workup of his chest pain  We will plan to see him back in our aortic clinic in 1 year with repeat non-contrast chest CT scan and echocardiogram for routine aortic surveillance  Jt Paz was comfortable with our recommendations, and their questions were answered to their satisfaction  Thank you for allowing us to participate in the care of this patient         SIGNATURE: ALEX Woodson  DATE: June 13, 2022  TIME: 2:51 PM  Attestation signed by Peewee Sen Jesse Alexander DO at 6/13/2022  3:34 PM:  Mr Fauzia Sanchez is a 52 y/o male who presents for evaluation of an incidentally found ascending aortic aneurysm of 43mm  He has no known family history of aneurysm or connective tissue disorder  Studies were reviewed by me personally  Indexed for BSA his aortic size falls within the normal range  However, based on these findings, I recommend continued smoking cessation, blood pressure control, surveillance and aortic precautions  SBP goal should be 110s-120s with resting heart rate goal 60-70  The patient will return to the office in 1 year for review of surveillance imaging  We will also refer him to our cardiologists due to his history of intermittent chest pain and family history of coronary disease

## 2022-08-12 ENCOUNTER — CONSULT (OUTPATIENT)
Dept: CARDIOLOGY CLINIC | Facility: CLINIC | Age: 47
End: 2022-08-12
Payer: COMMERCIAL

## 2022-08-12 VITALS
BODY MASS INDEX: 34.48 KG/M2 | HEART RATE: 60 BPM | WEIGHT: 298 LBS | HEIGHT: 78 IN | SYSTOLIC BLOOD PRESSURE: 108 MMHG | DIASTOLIC BLOOD PRESSURE: 72 MMHG

## 2022-08-12 DIAGNOSIS — I10 ESSENTIAL HYPERTENSION: Primary | ICD-10-CM

## 2022-08-12 DIAGNOSIS — I71.20 THORACIC AORTIC ANEURYSM: ICD-10-CM

## 2022-08-12 DIAGNOSIS — E78.2 MIXED HYPERLIPIDEMIA: ICD-10-CM

## 2022-08-12 DIAGNOSIS — R07.9 CHEST PAIN, UNSPECIFIED TYPE: ICD-10-CM

## 2022-08-12 PROCEDURE — 99204 OFFICE O/P NEW MOD 45 MIN: CPT | Performed by: NURSE PRACTITIONER

## 2022-08-12 NOTE — ASSESSMENT & PLAN NOTE
Discovered in 2017 on CT scan, incidental finding  Recent imaging reviewed, 4 3 cm  Echo ordered to assess aortic valve

## 2022-08-12 NOTE — PROGRESS NOTES
Patient ID: Key Wolf is a 51 y o  male  Plan:      Thoracic aortic aneurysm ([address] )  Discovered in 2017 on CT scan, incidental finding  Recent imaging reviewed, 4 3 cm  Echo ordered to assess aortic valve      Essential hypertension  Blood pressure well controlled  Continue lisinopril    Mixed hyperlipidemia  Continue lipitor 80 mg daily    Chest pain  Ongoing episodes of chest pain  Will check stress test       Follow up Plan/Summary Comments:  I have ordered an echocardiogram for further assessment of the aortic valve given his known thoracic aortic aneurysm  The aneurysm was recently assessed on CT imaging during an ER visit  It was noted to have increased in size from 4 0 cm to 4 3 cm since 2017  I have ordered a stress test for further evaluation of his chest pain and other atypical symptoms  There is a family history, though his symptoms do not follow a typical pattern of angina  I recommended that he follow-up with your office for some additional blood work including a TSH for his fatigue  He also questions whether his vitamin-D level is okay and I advised him to discuss with you  I did not recommend any medication changes today I recommended that he continue lisinopril Lipitor for blood pressure and cholesterol management  I will see Khari Isaac back in 6-8 weeks to review the results of his testing and for follow-up  He was advised to notify our office with any worsening or new symptoms  HPI:  I had the pleasure of meeting Khari Isaac in the office today to establish cardiac care  Khari Isaac is a pleasant 50-year-old male with medical problems including htn, hld, obesity  He has a known thoracic aortic aneurysm that was discovered incidentally in 2017  He states at that time, he was told that he had an MI by an EMS provider  He was previously followed by the [address] [place] [address] Drive for his hypertension and hyperlipidemia    Following a recent ER encounter for chest pain, he presented to establish care in our office  Susana Bran reports intermittent episodes of not feeling well over the last 6 months, now more severe and more frequent  On June 8th, he was evaluated in the ER at [facility] for chest pressure and SOB  Episode lasted for several hours  Resolved with meds (duo neb, fentanyl) in ER  Trop levels normal     He had another episode of not feeling well while on vacation last week--no cardiac symptoms, just didn't feel well and is unable to elaborate further  This morning he had SOB while at work  He notes that it came on out of nowhere  No associated chest pressure, tightness, burning or pain  Resolved without intervention after 5 minutes  He denies any episodes of dizziness, lightheadedness, syncope, palpitations  He is a former smoker, quit years ago  Alcohol on occasion  Denies drug use  Family history Father had an MI in his mid-late 42's  Now has Afib  Review of Systems   10  point ROS  was otherwise non pertinent or negative except as per HPI or as below  Gait: Normal      Most recent or relevant cardiac/vascular testing:    None          Objective:     /72   Pulse 60   Ht 6' 9" (2 057 m)   Wt 135 kg (298 lb)   BMI 31 93 kg/m²     PHYSICAL EXAM:    General:  Normal appearance, no acute distress  Eyes:  Anicteric  Oral mucosa:  Moist   Neck:  No JVD  Carotid upstrokes are brisk without bruits  No masses  Chest:  Clear to auscultation   Cardiac:  No palpable PMI  Normal S1 and S2  No murmur gallop or rub  Abdomen:  Soft and nontender  No palpable organomegaly or aortic enlargement  Extremities:  No peripheral edema  Musculoskeletal:  Symmetric  Vascular:  Pedal pulses are intact  Neuro:  Grossly symmetric  Psych:  Alert and oriented x3      Allergies   Allergen Reactions    Ibuprofen Other (See Comments) and GI Bleeding     Other reaction(s): severe chest pain  800mg  800mg  Other reaction(s): Free Text  800mg    Cat Hair Extract Wheezing  Grass Extracts [Gramineae Pollens] Headache     Eyes water    Lidocaine Hives     rash    Pollen Extract Sneezing    Bee Pollen Rash    Dust Mite Extract Sneezing    Mold Extract [Trichophyton] Sneezing       Current Outpatient Medications:     atorvastatin (LIPITOR) 80 mg tablet, , Disp: , Rfl:     lisinopril (ZESTRIL) 10 mg tablet, Take 40 mg by mouth, Disp: , Rfl:     Cholecalciferol 1 25 MG (23189 UT) TABS, take 1 capsule by mouth ONCE EVERY WEEK FOR 4 WEEKS THEN ONE CAPSULE ONCE EVERY MONTH (Patient not taking: Reported on 2022), Disp: , Rfl:     meloxicam (MOBIC) 15 mg tablet, Take 1 tablet (15 mg total) by mouth daily (Patient not taking: No sig reported), Disp: 10 tablet, Rfl: 0    pantoprazole (PROTONIX) 20 mg tablet, Take 20 mg by mouth daily (Patient not taking: No sig reported), Disp: , Rfl:   Past Medical History:   Diagnosis Date    Ascending aortic aneurysm (HCC)     Asthma     Hyperlipidemia     Hypertension     [address] ([facility] [address] )      Past Surgical History:   Procedure Laterality Date    HERNIA REPAIR      LYMPHADENECTOMY      ORCHIECTOMY      and LN dissection    ROTATOR CUFF REPAIR         CMP:   Lab Results   Component Value Date    K 3 8 2022     2022    CO2 27 2022    BUN 18 2022    CREATININE 0 78 2022    EGFR 107 2022     Lipid Profile:  No results found for: CHOL, TRIG, HDL, LDL      Social History     Tobacco Use   Smoking Status Former Smoker    Packs/day: 0 50    Years: 1 00    Pack years: 0 50    Types: Cigarettes    Start date: 1992   Ghazal Bocanegra Quit date: 10/5/1993    Years since quittin 8   Smokeless Tobacco Never Used

## 2023-05-22 ENCOUNTER — TELEPHONE (OUTPATIENT)
Dept: CARDIAC SURGERY | Facility: CLINIC | Age: 48
End: 2023-05-22

## 2023-05-22 NOTE — TELEPHONE ENCOUNTER
Called patient and left a message for patient to call the office and updated health insurance, patient is scheduled to have a CT CHEST And ECHO on 5/30/23, unable to obtain auth

## 2023-05-25 NOTE — TELEPHONE ENCOUNTER
2nd attempt: called patient to get updated health insurance information, patient stated he will call the office back with his member ID #

## 2023-05-25 NOTE — TELEPHONE ENCOUNTER
Left patient a message to make him aware, ct was approved but he needs to call Salvador Silva and confirm he would like ct done at [facility]

## 2023-05-30 ENCOUNTER — HOSPITAL ENCOUNTER (OUTPATIENT)
Dept: NON INVASIVE DIAGNOSTICS | Facility: HOSPITAL | Age: 48
Discharge: HOME | End: 2023-05-30

## 2023-05-30 ENCOUNTER — HOSPITAL ENCOUNTER (OUTPATIENT)
Dept: CT IMAGING | Facility: HOSPITAL | Age: 48
Discharge: HOME | End: 2023-05-30

## 2023-05-30 VITALS
SYSTOLIC BLOOD PRESSURE: 108 MMHG | DIASTOLIC BLOOD PRESSURE: 72 MMHG | WEIGHT: 298 LBS | HEART RATE: 66 BPM | HEIGHT: 78 IN | BODY MASS INDEX: 34.48 KG/M2

## 2023-05-30 DIAGNOSIS — I71.20 THORACIC AORTIC ANEURYSM (HCC): ICD-10-CM

## 2023-05-30 LAB
AORTIC ROOT: 3.7 CM
AORTIC VALVE MEAN VELOCITY: 10.3 M/S
APICAL FOUR CHAMBER EJECTION FRACTION: 55 %
ASCENDING AORTA: 4.3 CM
AV AREA BY CONTINUOUS VTI: 3 CM2
AV AREA PEAK VELOCITY: 2.7 CM2
AV LVOT MEAN GRADIENT: 3 MMHG
AV LVOT PEAK GRADIENT: 7 MMHG
AV MEAN GRADIENT: 5 MMHG
AV PEAK GRADIENT: 9 MMHG
AV VALVE AREA: 2.95 CM2
AV VELOCITY RATIO: 0.87
DOP CALC AO PEAK VEL: 1.54 M/S
DOP CALC AO VTI: 34.79 CM
DOP CALC LVOT AREA: 3.14 CM2
DOP CALC LVOT DIAMETER: 2 CM
DOP CALC LVOT PEAK VEL VTI: 32.74 CM
DOP CALC LVOT PEAK VEL: 1.34 M/S
DOP CALC LVOT STROKE INDEX: 36.4 ML/M2
DOP CALC LVOT STROKE VOLUME: 102.8
E WAVE DECELERATION TIME: 226 MS
FRACTIONAL SHORTENING: 31 (ref 28–44)
INTERVENTRICULAR SEPTUM IN DIASTOLE (PARASTERNAL SHORT AXIS VIEW): 1 CM
INTERVENTRICULAR SEPTUM: 1 CM (ref 0.6–1.1)
LAAS-AP2: 24.6 CM2
LAAS-AP4: 23.1 CM2
LEFT ATRIUM SIZE: 4.2 CM
LEFT INTERNAL DIMENSION IN SYSTOLE: 3.8 CM (ref 2.1–4)
LEFT VENTRICULAR INTERNAL DIMENSION IN DIASTOLE: 5.5 CM (ref 3.5–6)
LEFT VENTRICULAR POSTERIOR WALL IN END DIASTOLE: 1 CM
LEFT VENTRICULAR STROKE VOLUME: 85 ML
LVSV (TEICH): 85 ML
MV E'TISSUE VEL-SEP: 11 CM/S
MV PEAK A VEL: 0.62 M/S
MV PEAK E VEL: 84 CM/S
MV STENOSIS PRESSURE HALF TIME: 65 MS
MV VALVE AREA P 1/2 METHOD: 3.38
RIGHT ATRIUM AREA SYSTOLE A4C: 17 CM2
RIGHT VENTRICLE ID DIMENSION: 3.9 CM
SINOTUBULAR JUNCTION: 3.9 CM
SL CV LEFT ATRIUM LENGTH A2C: 5.6 CM
SL CV LV EF: 55
SL CV PED ECHO LEFT VENTRICLE DIASTOLIC VOLUME (MOD BIPLANE) 2D: 146 ML
SL CV PED ECHO LEFT VENTRICLE SYSTOLIC VOLUME (MOD BIPLANE) 2D: 62 ML
SL CV SINUS OF VALSALVA 2D: 3.8 CM
STJ: 3.9 CM
TR MAX PG: 23 MMHG
TR PEAK VELOCITY: 2.4 M/S
TRICUSPID ANNULAR PLANE SYSTOLIC EXCURSION: 1.8 CM
TRICUSPID VALVE PEAK REGURGITATION VELOCITY: 2.38 M/S

## 2023-06-05 ENCOUNTER — OFFICE VISIT (OUTPATIENT)
Dept: CARDIAC SURGERY | Facility: CLINIC | Age: 48
End: 2023-06-05
Payer: COMMERCIAL

## 2023-06-05 VITALS
BODY MASS INDEX: 34.84 KG/M2 | TEMPERATURE: 96.3 F | WEIGHT: 301.1 LBS | HEART RATE: 67 BPM | DIASTOLIC BLOOD PRESSURE: 88 MMHG | SYSTOLIC BLOOD PRESSURE: 130 MMHG | OXYGEN SATURATION: 100 % | HEIGHT: 78 IN

## 2023-06-05 DIAGNOSIS — I71.21 ANEURYSM OF ASCENDING AORTA WITHOUT RUPTURE (HCC): Primary | ICD-10-CM

## 2023-06-05 PROCEDURE — 99214 OFFICE O/P EST MOD 30 MIN: CPT | Performed by: THORACIC SURGERY (CARDIOTHORACIC VASCULAR SURGERY)

## 2023-06-05 RX ORDER — LISINOPRIL 40 MG/1
40 TABLET ORAL DAILY
COMMUNITY
Start: 2023-05-31

## 2023-06-05 RX ORDER — ERGOCALCIFEROL 1.25 MG/1
50000 CAPSULE ORAL
COMMUNITY
Start: 2023-05-29

## 2023-06-05 RX ORDER — ROSUVASTATIN CALCIUM 40 MG/1
40 TABLET, COATED ORAL DAILY
COMMUNITY
Start: 2023-05-29

## 2023-06-05 NOTE — LETTER
June 5, 2023     [address] [place] [address]  [address] [facility]    Patient: Phuong Sood   YOB: 1975   Date of Visit: 6/5/2023       Dear Dr Gordon Castillo: Thank you for referring Phuong Sood to me for evaluation  Below are my notes for this consultation  If you have questions, please do not hesitate to call me  I look forward to following your patient along with you  Sincerely,        Sobeida Rios DO        CC: DO Lisandro Avery PA-C  6/5/2023  9:41 AM  Attested  Aortic Surveillance - Cardiothoracic Surgery   Phuong Sood 49 y o  male MRN: [id number]    History of Present Illness: Phuong Sood is a 49y o  year old male who presents today for ongoing surveillance of previously identified ascending aortic aneurysm  They were most recently evaluated in our office with CT imaging in June 2022, and aneurysm measured 43 mm at that time  In review, patient has a PMHx of HTN, HLD, asthma, scoliosis, testicular cancer (s/p orchiectomy and chemo treatment in 1993), lumbar radiculopathy  Family history significant for premature CAD in his dad, who had an MI at age 39  His mom has an ascending aortic aneurysm as well as aortic regurgitation  Upon interview, patient denies chest pain, shortness of breath, syncope, difficulty swallowing, upper back pain, ANNE, palpitations  He has occasional dizziness, and recently had numbness in his arms that was transient, and he attributes this to stress at work  He is employed as a   He does not smoke or use drugs  He is an occasional drinker  He follows with Dr Edelmira Angulo at the [address] [place] [address] Drive  He was recently started on Lisinopril for his blood pressure  He does not routinely check his blood pressure at home       Past Medical History:  Past Medical History:   Diagnosis Date   • Ascending aortic aneurysm ([facility] [address] )    • Asthma    • Hyperlipidemia    • Hypertension    • Testicular cancer ([facility] [address] )          Past Surgical History:   Past Surgical History:   Procedure Laterality Date   • HERNIA REPAIR     • LYMPHADENECTOMY     • ORCHIECTOMY      and LN dissection   • ROTATOR CUFF REPAIR           Family History:  Family History   Problem Relation Age of Onset   • Cancer Mother    • Hypertension Father    • Thyroid disease Sister    • No Known Problems Daughter          Social History:    Social History     Substance and Sexual Activity   Alcohol Use Yes    Comment: few beers a or 2 month     Social History     Substance and Sexual Activity   Drug Use Never     Social History     Tobacco Use   Smoking Status Former   • Packs/day: 0 50   • Years: 1 00   • Total pack years: 0 50   • Types: Cigarettes   • Start date: 1992   • Quit date: 10/5/1993   • Years since quittin 6   Smokeless Tobacco Never       Home Medications:   Prior to Admission medications    Medication Sig Start Date End Date Taking? Authorizing Provider   atorvastatin (LIPITOR) 80 mg tablet     Historical Provider, MD   Cholecalciferol 1 25 MG (64369 UT) TABS take 1 capsule by mouth ONCE EVERY WEEK FOR 4 WEEKS THEN ONE CAPSULE ONCE EVERY MONTH  Patient not taking: Reported on 2022   Historical Provider, MD   lisinopril (ZESTRIL) 10 mg tablet Take 40 mg by mouth    Historical Provider, MD   meloxicam (MOBIC) 15 mg tablet Take 1 tablet (15 mg total) by mouth daily  Patient not taking: No sig reported 20   Marcelle Pritchard MD   pantoprazole (PROTONIX) 20 mg tablet Take 20 mg by mouth daily  Patient not taking: No sig reported    Historical Provider, MD       Allergies:   Allergies   Allergen Reactions   • Ibuprofen Other (See Comments) and GI Bleeding     Other reaction(s): severe chest pain  800mg  800mg  Other reaction(s): Free Text  800mg   • Cat Hair Extract Wheezing   • Grass Extracts [Gramineae Pollens] Headache     Eyes water   • Lidocaine Hives     rash   • Pollen Extract Sneezing   • Bee Pollen Rash   • Dust Mite Extract "Sneezing   • Mold Extract [Trichophyton] Sneezing       Review of Systems:     Review of Systems   Constitutional: Negative  HENT: Negative  Eyes: Negative  Respiratory: Negative  Cardiovascular: Negative  Gastrointestinal: Negative  Endocrine: Negative  Genitourinary: Negative  Musculoskeletal: Positive for back pain  Low back pain, h/o scoliosis     Skin: Negative  Allergic/Immunologic: Negative  Neurological: Positive for numbness  Arms   Hematological: Negative  Psychiatric/Behavioral: Negative  Vital Signs:     Vitals:    06/05/23 0847 06/05/23 0855   BP: 125/84 130/88   BP Location: Left arm Right arm   Patient Position: Sitting Sitting   Cuff Size: Large Large   Pulse: 67    Temp: (!) 96 3 °F (35 7 °C)    TempSrc: Tympanic    SpO2: 100%    Weight: (!) 137 kg (301 lb 1 6 oz)    Height: 6' 9\" (2 057 m)        Physical Exam:     Physical Exam  Constitutional:       General: He is not in acute distress  HENT:      Head: Normocephalic and atraumatic  Eyes:      Pupils: Pupils are equal, round, and reactive to light  Cardiovascular:      Rate and Rhythm: Normal rate and regular rhythm  Pulses:           Carotid pulses are 2+ on the right side and 2+ on the left side  Dorsalis pedis pulses are 2+ on the right side and 2+ on the left side  Posterior tibial pulses are 2+ on the right side and 2+ on the left side  Heart sounds: Normal heart sounds  Pulmonary:      Effort: Pulmonary effort is normal       Breath sounds: Normal breath sounds  Abdominal:      Palpations: Abdomen is soft  Tenderness: There is no abdominal tenderness  Musculoskeletal:      Cervical back: Full passive range of motion without pain  Skin:     General: Skin is warm and dry  Capillary Refill: Capillary refill takes less than 2 seconds  Neurological:      General: No focal deficit present  Mental Status: He is alert     Psychiatric:         " "Mood and Affect: Mood normal          Behavior: Behavior normal  Behavior is cooperative  Lab Results:               Invalid input(s): \"LABGLOM\"      No results found for: \"HGBA1C\"  No results found for: \"CKMB\", \"CKMBINDEX\", \"CKTOTAL\", \"TROPONINI\"    Imaging Studies:     CT Chest: 5/30/23  IMPRESSION:     Stable fusiform ectasia of the ascending thoracic aorta measuring up to 43 mm  Recommendation is for continued follow-up low radiation dose chest CT in one year  Echocardiogram: 5/30/23  Findings    Left Ventricle Left ventricular cavity size is mildly dilated  Wall thickness is mildly increased  The left ventricular ejection fraction is 55%  Systolic function is normal   Wall motion is normal  Diastolic function is normal       Right Ventricle Right ventricular cavity size is normal  Systolic function is normal  Normal tricuspid annular plane systolic excursion (TAPSE) > 1 7 cm  Left Atrium The atrium is normal in size  Right Atrium The atrium is normal in size  Aortic Valve The aortic valve is trileaflet  There is mild regurgitation  The aortic valve has no significant stenosis  Mitral Valve There is mild regurgitation  There is no evidence of stenosis  Tricuspid Valve Tricuspid valve structure is normal  There is trace regurgitation  There is no evidence of stenosis  The right ventricular systolic pressure is normal       Pulmonic Valve Pulmonic valve structure is normal  There is mild regurgitation  There is no evidence of stenosis  Ascending Aorta The aortic root is mildly dilated to 3 8 cm  The ascending aorta is dilated to 4 3 cm  IVC/SVC The inferior vena cava is normal in size  Pericardium There is no pericardial effusion  The pericardium is normal in appearance  I have personally reviewed pertinent reports        Assessment:  Patient Active Problem List    Diagnosis Date Noted   • Chest pain 08/12/2022   • Thoracic aortic aneurysm ([address] ) " 06/13/2022   • Olecranon bursitis of left elbow 08/04/2020   • Essential hypertension 08/04/2020   • Mixed hyperlipidemia 08/04/2020     Ascending aortic aneurysm; Ongoing ascending aortic replacement workup    Plan:      CT chest, without contrast performed prior to the visit today was reviewed  Ascending aorta was measured at 43 mm at it's greatest diameter  These findings were confirmed and shared with the patient today  As there has been no interval enlargement since June 2022,  follow-up monitoring is the treatment plan  Arrangements have been made for future surveillance to be completed with CT chest, without contrast in 1 Years  Bill Smith was comfortable with our recommendations, and their questions were answered to their satisfaction  Thank you for allowing us to participate in the care of this patient  Aortic Aneurysm Instructions were provided to the patient as follows:    1  No lifting more than 50 pounds  2  Maintain a controlled blood pressure with a goal of 120/80  3  Follow up in Aortic Clinic as recommended with radiology follow up as instructed  4  Report to the ER or call 911 immediately with the following signs / symptoms: sudden onset of back pain, chest pain or shortness of breath  The patient recently had a screening colonoscopy in 2018  Therefore GI referral is not indicated at this time  SIGNATURE: Glory Spears PA-C  DATE: June 5, 2023  TIME: 9:24 AM     Attestation signed by Danilo Bennett DO at 6/5/2023  9:43 AM:  I supervised the Advanced Practitioner  ? I performed, in its entirety, the assessment/plan component of the visit  I agree with the Advanced Practitioner's note with the following additions/exceptions:      Mr Janna Vides presents for routine surveillance of his ascending aortic aneurysm  CT and TTE were reviewed by me personally  CT showed no significant change at 43mm  TTE showed a trileaflet aortic valve with mild AI, preserved LVEF    Based on these findings, I recommend continued smoking cessation, blood pressure control, surveillance and aortic precautions  SBP goal should be 110s-120s with resting heart rate goal 60-70  The patient will return to the office in 1 year for review of surveillance imaging        Richie File, DO 06/05/23

## 2023-06-05 NOTE — PROGRESS NOTES
Aortic Surveillance - Cardiothoracic Surgery   Cindy Stein 49 y o  male MRN: [id number]    History of Present Illness: Cindy Stein is a 49y o  year old male who presents today for ongoing surveillance of previously identified ascending aortic aneurysm  They were most recently evaluated in our office with CT imaging in June 2022, and aneurysm measured 43 mm at that time  In review, patient has a PMHx of HTN, HLD, asthma, scoliosis, testicular cancer (s/p orchiectomy and chemo treatment in 1993), lumbar radiculopathy  Family history significant for premature CAD in his dad, who had an MI at age 39  His mom has an ascending aortic aneurysm as well as aortic regurgitation  Upon interview, patient denies chest pain, shortness of breath, syncope, difficulty swallowing, upper back pain, ANNE, palpitations  He has occasional dizziness, and recently had numbness in his arms that was transient, and he attributes this to stress at work  He is employed as a   He does not smoke or use drugs  He is an occasional drinker  He follows with Dr Jodi Fofana at the [address] [place] [address] Drive  He was recently started on Lisinopril for his blood pressure  He does not routinely check his blood pressure at home       Past Medical History:  Past Medical History:   Diagnosis Date   • Ascending aortic aneurysm ([facility] [address] )    • Asthma    • Hyperlipidemia    • Hypertension    • Testicular cancer [facility])          Past Surgical History:   Past Surgical History:   Procedure Laterality Date   • HERNIA REPAIR     • LYMPHADENECTOMY     • ORCHIECTOMY      and LN dissection   • ROTATOR CUFF REPAIR           Family History:  Family History   Problem Relation Age of Onset   • Cancer Mother    • Hypertension Father    • Thyroid disease Sister    • No Known Problems Daughter          Social History:    Social History     Substance and Sexual Activity   Alcohol Use Yes    Comment: few beers a or 2 month     Social History     Substance and Sexual Activity   Drug Use Never     Social History     Tobacco Use   Smoking Status Former   • Packs/day: 0 50   • Years: 1 00   • Total pack years: 0 50   • Types: Cigarettes   • Start date: 1992   • Quit date: 10/5/1993   • Years since quittin 6   Smokeless Tobacco Never       Home Medications:   Prior to Admission medications    Medication Sig Start Date End Date Taking? Authorizing Provider   atorvastatin (LIPITOR) 80 mg tablet     Historical Provider, MD   Cholecalciferol 1 25 MG (37742 UT) TABS take 1 capsule by mouth ONCE EVERY WEEK FOR 4 WEEKS THEN ONE CAPSULE ONCE EVERY MONTH  Patient not taking: Reported on 2022   Historical Provider, MD   lisinopril (ZESTRIL) 10 mg tablet Take 40 mg by mouth    Historical Provider, MD   meloxicam (MOBIC) 15 mg tablet Take 1 tablet (15 mg total) by mouth daily  Patient not taking: No sig reported 20   Esmer Miller MD   pantoprazole (PROTONIX) 20 mg tablet Take 20 mg by mouth daily  Patient not taking: No sig reported    Historical Provider, MD       Allergies: Allergies   Allergen Reactions   • Ibuprofen Other (See Comments) and GI Bleeding     Other reaction(s): severe chest pain  800mg  800mg  Other reaction(s): Free Text  800mg   • Cat Hair Extract Wheezing   • Grass Extracts [Gramineae Pollens] Headache     Eyes water   • Lidocaine Hives     rash   • Pollen Extract Sneezing   • Bee Pollen Rash   • Dust Mite Extract Sneezing   • Mold Extract [Trichophyton] Sneezing       Review of Systems:     Review of Systems   Constitutional: Negative  HENT: Negative  Eyes: Negative  Respiratory: Negative  Cardiovascular: Negative  Gastrointestinal: Negative  Endocrine: Negative  Genitourinary: Negative  Musculoskeletal: Positive for back pain  Low back pain, h/o scoliosis     Skin: Negative  Allergic/Immunologic: Negative  Neurological: Positive for numbness  Arms   Hematological: Negative      Psychiatric/Behavioral: "Negative  Vital Signs:     Vitals:    06/05/23 0847 06/05/23 0855   BP: 125/84 130/88   BP Location: Left arm Right arm   Patient Position: Sitting Sitting   Cuff Size: Large Large   Pulse: 67    Temp: (!) 96 3 °F (35 7 °C)    TempSrc: Tympanic    SpO2: 100%    Weight: (!) 137 kg (301 lb 1 6 oz)    Height: 6' 9\" (2 057 m)        Physical Exam:     Physical Exam  Constitutional:       General: He is not in acute distress  HENT:      Head: Normocephalic and atraumatic  Eyes:      Pupils: Pupils are equal, round, and reactive to light  Cardiovascular:      Rate and Rhythm: Normal rate and regular rhythm  Pulses:           Carotid pulses are 2+ on the right side and 2+ on the left side  Dorsalis pedis pulses are 2+ on the right side and 2+ on the left side  Posterior tibial pulses are 2+ on the right side and 2+ on the left side  Heart sounds: Normal heart sounds  Pulmonary:      Effort: Pulmonary effort is normal       Breath sounds: Normal breath sounds  Abdominal:      Palpations: Abdomen is soft  Tenderness: There is no abdominal tenderness  Musculoskeletal:      Cervical back: Full passive range of motion without pain  Skin:     General: Skin is warm and dry  Capillary Refill: Capillary refill takes less than 2 seconds  Neurological:      General: No focal deficit present  Mental Status: He is alert  Psychiatric:         Mood and Affect: Mood normal          Behavior: Behavior normal  Behavior is cooperative  Lab Results:               Invalid input(s): \"LABGLOM\"      No results found for: \"HGBA1C\"  No results found for: \"CKMB\", \"CKMBINDEX\", \"CKTOTAL\", \"TROPONINI\"    Imaging Studies:     CT Chest: 5/30/23  IMPRESSION:     Stable fusiform ectasia of the ascending thoracic aorta measuring up to 43 mm  Recommendation is for continued follow-up low radiation dose chest CT in one year      Echocardiogram: 5/30/23  Findings    Left Ventricle Left " ventricular cavity size is mildly dilated  Wall thickness is mildly increased  The left ventricular ejection fraction is 55%  Systolic function is normal   Wall motion is normal  Diastolic function is normal       Right Ventricle Right ventricular cavity size is normal  Systolic function is normal  Normal tricuspid annular plane systolic excursion (TAPSE) > 1 7 cm  Left Atrium The atrium is normal in size  Right Atrium The atrium is normal in size  Aortic Valve The aortic valve is trileaflet  There is mild regurgitation  The aortic valve has no significant stenosis  Mitral Valve There is mild regurgitation  There is no evidence of stenosis  Tricuspid Valve Tricuspid valve structure is normal  There is trace regurgitation  There is no evidence of stenosis  The right ventricular systolic pressure is normal       Pulmonic Valve Pulmonic valve structure is normal  There is mild regurgitation  There is no evidence of stenosis  Ascending Aorta The aortic root is mildly dilated to 3 8 cm  The ascending aorta is dilated to 4 3 cm  IVC/SVC The inferior vena cava is normal in size  Pericardium There is no pericardial effusion  The pericardium is normal in appearance  I have personally reviewed pertinent reports  Assessment:  Patient Active Problem List    Diagnosis Date Noted   • Chest pain 08/12/2022   • Thoracic aortic aneurysm ([address] ) 06/13/2022   • Olecranon bursitis of left elbow 08/04/2020   • Essential hypertension 08/04/2020   • Mixed hyperlipidemia 08/04/2020     Ascending aortic aneurysm; Ongoing ascending aortic replacement workup    Plan:      CT chest, without contrast performed prior to the visit today was reviewed  Ascending aorta was measured at 43 mm at it's greatest diameter  These findings were confirmed and shared with the patient today  As there has been no interval enlargement since June 2022,  follow-up monitoring is the treatment plan  Arrangements have been made for future surveillance to be completed with CT chest, without contrast in 1 Years  Robert Fermín was comfortable with our recommendations, and their questions were answered to their satisfaction  Thank you for allowing us to participate in the care of this patient  Aortic Aneurysm Instructions were provided to the patient as follows:    1  No lifting more than 50 pounds  2  Maintain a controlled blood pressure with a goal of 120/80  3  Follow up in Aortic Clinic as recommended with radiology follow up as instructed  4  Report to the ER or call 911 immediately with the following signs / symptoms: sudden onset of back pain, chest pain or shortness of breath  The patient recently had a screening colonoscopy in 2018  Therefore GI referral is not indicated at this time       SIGNATURE: Rohini Velez PA-C  DATE: June 5, 2023  TIME: 9:24 AM

## 2023-07-23 ENCOUNTER — APPOINTMENT (EMERGENCY)
Dept: RADIOLOGY | Facility: HOSPITAL | Age: 48
End: 2023-07-23
Payer: COMMERCIAL

## 2023-07-23 ENCOUNTER — HOSPITAL ENCOUNTER (OUTPATIENT)
Facility: HOSPITAL | Age: 48
Setting detail: OBSERVATION
Discharge: HOME | End: 2023-07-24
Attending: FAMILY MEDICINE | Admitting: INTERNAL MEDICINE
Payer: COMMERCIAL

## 2023-07-23 ENCOUNTER — APPOINTMENT (EMERGENCY)
Dept: CT IMAGING | Facility: HOSPITAL | Age: 48
End: 2023-07-23
Payer: COMMERCIAL

## 2023-07-23 DIAGNOSIS — I10 ESSENTIAL HYPERTENSION: ICD-10-CM

## 2023-07-23 DIAGNOSIS — R07.9 CHEST PAIN, UNSPECIFIED TYPE: Primary | ICD-10-CM

## 2023-07-23 LAB
2HR DELTA HS TROPONIN: 0 NG/L
ALBUMIN SERPL BCP-MCNC: 4.6 G/DL (ref 3.5–5)
ALP SERPL-CCNC: 47 U/L (ref 34–104)
ALT SERPL W P-5'-P-CCNC: 21 U/L (ref 7–52)
ANION GAP SERPL CALCULATED.3IONS-SCNC: 9 MMOL/L
AST SERPL W P-5'-P-CCNC: 17 U/L (ref 13–39)
ATRIAL RATE: 59 BPM
ATRIAL RATE: 61 BPM
BASOPHILS # BLD AUTO: 0.01 THOUSANDS/ÂΜL (ref 0–0.1)
BASOPHILS NFR BLD AUTO: 0 % (ref 0–1)
BILIRUB SERPL-MCNC: 0.82 MG/DL (ref 0.2–1)
BUN SERPL-MCNC: 17 MG/DL (ref 5–25)
CALCIUM SERPL-MCNC: 9.4 MG/DL (ref 8.4–10.2)
CARDIAC TROPONIN I PNL SERPL HS: 2 NG/L
CARDIAC TROPONIN I PNL SERPL HS: 2 NG/L
CHLORIDE SERPL-SCNC: 102 MMOL/L (ref 96–108)
CO2 SERPL-SCNC: 27 MMOL/L (ref 21–32)
CREAT SERPL-MCNC: 0.84 MG/DL (ref 0.6–1.3)
EOSINOPHIL # BLD AUTO: 0.08 THOUSAND/ÂΜL (ref 0–0.61)
EOSINOPHIL NFR BLD AUTO: 1 % (ref 0–6)
ERYTHROCYTE [DISTWIDTH] IN BLOOD BY AUTOMATED COUNT: 12 % (ref 11.6–15.1)
FLUAV RNA RESP QL NAA+PROBE: NEGATIVE
FLUBV RNA RESP QL NAA+PROBE: NEGATIVE
GFR SERPL CREATININE-BSD FRML MDRD: 103 ML/MIN/1.73SQ M
GLUCOSE SERPL-MCNC: 83 MG/DL (ref 65–140)
HCT VFR BLD AUTO: 41.1 % (ref 36.5–49.3)
HGB BLD-MCNC: 13.6 G/DL (ref 12–17)
IMM GRANULOCYTES # BLD AUTO: 0.02 THOUSAND/UL (ref 0–0.2)
IMM GRANULOCYTES NFR BLD AUTO: 0 % (ref 0–2)
LYMPHOCYTES # BLD AUTO: 1.38 THOUSANDS/ÂΜL (ref 0.6–4.47)
LYMPHOCYTES NFR BLD AUTO: 20 % (ref 14–44)
MCH RBC QN AUTO: 28.9 PG (ref 26.8–34.3)
MCHC RBC AUTO-ENTMCNC: 33.1 G/DL (ref 31.4–37.4)
MCV RBC AUTO: 87 FL (ref 82–98)
MONOCYTES # BLD AUTO: 0.39 THOUSAND/ÂΜL (ref 0.17–1.22)
MONOCYTES NFR BLD AUTO: 6 % (ref 4–12)
NEUTROPHILS # BLD AUTO: 4.93 THOUSANDS/ÂΜL (ref 1.85–7.62)
NEUTS SEG NFR BLD AUTO: 73 % (ref 43–75)
NRBC BLD AUTO-RTO: 0 /100 WBCS
P AXIS: 48 DEGREES
P AXIS: 53 DEGREES
PLATELET # BLD AUTO: 217 THOUSANDS/UL (ref 149–390)
PMV BLD AUTO: 9.5 FL (ref 8.9–12.7)
POTASSIUM SERPL-SCNC: 3.9 MMOL/L (ref 3.5–5.3)
PR INTERVAL: 204 MS
PR INTERVAL: 206 MS
PROT SERPL-MCNC: 7.4 G/DL (ref 6.4–8.4)
QRS AXIS: -36 DEGREES
QRS AXIS: -38 DEGREES
QRSD INTERVAL: 106 MS
QRSD INTERVAL: 106 MS
QT INTERVAL: 416 MS
QT INTERVAL: 428 MS
QTC INTERVAL: 418 MS
QTC INTERVAL: 423 MS
RBC # BLD AUTO: 4.7 MILLION/UL (ref 3.88–5.62)
RSV RNA RESP QL NAA+PROBE: NEGATIVE
SARS-COV-2 RNA RESP QL NAA+PROBE: NEGATIVE
SODIUM SERPL-SCNC: 138 MMOL/L (ref 135–147)
T WAVE AXIS: 35 DEGREES
T WAVE AXIS: 38 DEGREES
VENTRICULAR RATE: 59 BPM
VENTRICULAR RATE: 61 BPM
WBC # BLD AUTO: 6.81 THOUSAND/UL (ref 4.31–10.16)

## 2023-07-23 PROCEDURE — 93005 ELECTROCARDIOGRAM TRACING: CPT

## 2023-07-23 PROCEDURE — 80053 COMPREHEN METABOLIC PANEL: CPT | Performed by: FAMILY MEDICINE

## 2023-07-23 PROCEDURE — 96374 THER/PROPH/DIAG INJ IV PUSH: CPT

## 2023-07-23 PROCEDURE — 93010 ELECTROCARDIOGRAM REPORT: CPT | Performed by: INTERNAL MEDICINE

## 2023-07-23 PROCEDURE — 99223 1ST HOSP IP/OBS HIGH 75: CPT | Performed by: PHYSICIAN ASSISTANT

## 2023-07-23 PROCEDURE — G1004 CDSM NDSC: HCPCS

## 2023-07-23 PROCEDURE — 99222 1ST HOSP IP/OBS MODERATE 55: CPT | Performed by: PHYSICIAN ASSISTANT

## 2023-07-23 PROCEDURE — 96376 TX/PRO/DX INJ SAME DRUG ADON: CPT

## 2023-07-23 PROCEDURE — 99285 EMERGENCY DEPT VISIT HI MDM: CPT

## 2023-07-23 PROCEDURE — 71275 CT ANGIOGRAPHY CHEST: CPT

## 2023-07-23 PROCEDURE — 84484 ASSAY OF TROPONIN QUANT: CPT | Performed by: FAMILY MEDICINE

## 2023-07-23 PROCEDURE — 0241U HB NFCT DS VIR RESP RNA 4 TRGT: CPT | Performed by: FAMILY MEDICINE

## 2023-07-23 PROCEDURE — 71045 X-RAY EXAM CHEST 1 VIEW: CPT

## 2023-07-23 PROCEDURE — 74174 CTA ABD&PLVS W/CONTRAST: CPT

## 2023-07-23 PROCEDURE — 36415 COLL VENOUS BLD VENIPUNCTURE: CPT

## 2023-07-23 PROCEDURE — 85025 COMPLETE CBC W/AUTO DIFF WBC: CPT | Performed by: FAMILY MEDICINE

## 2023-07-23 RX ORDER — ACETAMINOPHEN 325 MG/1
650 TABLET ORAL EVERY 4 HOURS PRN
Status: DISCONTINUED | OUTPATIENT
Start: 2023-07-23 | End: 2023-07-24 | Stop reason: HOSPADM

## 2023-07-23 RX ORDER — MAGNESIUM HYDROXIDE/ALUMINUM HYDROXICE/SIMETHICONE 120; 1200; 1200 MG/30ML; MG/30ML; MG/30ML
30 SUSPENSION ORAL EVERY 6 HOURS PRN
Status: DISCONTINUED | OUTPATIENT
Start: 2023-07-23 | End: 2023-07-24 | Stop reason: HOSPADM

## 2023-07-23 RX ORDER — ISOSORBIDE MONONITRATE 30 MG/1
30 TABLET, EXTENDED RELEASE ORAL DAILY
Status: DISCONTINUED | OUTPATIENT
Start: 2023-07-23 | End: 2023-07-24

## 2023-07-23 RX ORDER — ONDANSETRON 2 MG/ML
4 INJECTION INTRAMUSCULAR; INTRAVENOUS EVERY 6 HOURS PRN
Status: DISCONTINUED | OUTPATIENT
Start: 2023-07-23 | End: 2023-07-24 | Stop reason: HOSPADM

## 2023-07-23 RX ORDER — HYDROMORPHONE HCL/PF 1 MG/ML
0.5 SYRINGE (ML) INJECTION ONCE
Status: COMPLETED | OUTPATIENT
Start: 2023-07-23 | End: 2023-07-23

## 2023-07-23 RX ORDER — IBUPROFEN 200 MG
200 TABLET ORAL EVERY 6 HOURS PRN
COMMUNITY

## 2023-07-23 RX ORDER — LISINOPRIL 20 MG/1
40 TABLET ORAL DAILY
Status: DISCONTINUED | OUTPATIENT
Start: 2023-07-23 | End: 2023-07-24 | Stop reason: HOSPADM

## 2023-07-23 RX ORDER — ATORVASTATIN CALCIUM 80 MG/1
80 TABLET, FILM COATED ORAL
Status: DISCONTINUED | OUTPATIENT
Start: 2023-07-23 | End: 2023-07-24 | Stop reason: HOSPADM

## 2023-07-23 RX ORDER — ASPIRIN 81 MG/1
81 TABLET, CHEWABLE ORAL DAILY
Status: DISCONTINUED | OUTPATIENT
Start: 2023-07-24 | End: 2023-07-23

## 2023-07-23 RX ORDER — HYDROMORPHONE HCL/PF 1 MG/ML
1 SYRINGE (ML) INJECTION ONCE
Status: COMPLETED | OUTPATIENT
Start: 2023-07-23 | End: 2023-07-23

## 2023-07-23 RX ORDER — ISOSORBIDE MONONITRATE 30 MG/1
30 TABLET, EXTENDED RELEASE ORAL DAILY
Status: DISCONTINUED | OUTPATIENT
Start: 2023-07-23 | End: 2023-07-23

## 2023-07-23 RX ADMIN — HYDROMORPHONE HYDROCHLORIDE 0.5 MG: 1 INJECTION, SOLUTION INTRAMUSCULAR; INTRAVENOUS; SUBCUTANEOUS at 11:04

## 2023-07-23 RX ADMIN — ATORVASTATIN CALCIUM 80 MG: 80 TABLET, FILM COATED ORAL at 20:42

## 2023-07-23 RX ADMIN — HYDROMORPHONE HYDROCHLORIDE 1 MG: 1 INJECTION, SOLUTION INTRAMUSCULAR; INTRAVENOUS; SUBCUTANEOUS at 11:59

## 2023-07-23 RX ADMIN — ISOSORBIDE MONONITRATE 30 MG: 30 TABLET, EXTENDED RELEASE ORAL at 14:33

## 2023-07-23 RX ADMIN — ASPIRIN 81 MG: 81 TABLET, COATED ORAL at 16:22

## 2023-07-23 RX ADMIN — MORPHINE SULFATE 2 MG: 2 INJECTION, SOLUTION INTRAMUSCULAR; INTRAVENOUS at 21:35

## 2023-07-23 RX ADMIN — IOHEXOL 100 ML: 350 INJECTION, SOLUTION INTRAVENOUS at 11:19

## 2023-07-23 RX ADMIN — LISINOPRIL 40 MG: 20 TABLET ORAL at 14:33

## 2023-07-23 RX ADMIN — MORPHINE SULFATE 2 MG: 2 INJECTION, SOLUTION INTRAMUSCULAR; INTRAVENOUS at 17:06

## 2023-07-23 NOTE — ASSESSMENT & PLAN NOTE
· Patient with intermittent chest pain since Wednesday 7/19/23  · JULIA: 0  · Troponin: 2 -> 2  · EKG- no ischemic changes  · Continue home regimen of lisinopril and statin  · Patient started on lopressor 25 mg po BID and Imdur 30 mg po q 24 hours by cardiology  · Telemetry monitoring  · Cardiology consultation appreciated  · Labs in am

## 2023-07-23 NOTE — PLAN OF CARE
Problem: PAIN - ADULT  Goal: Verbalizes/displays adequate comfort level or baseline comfort level  Description: Interventions:  - Encourage patient to monitor pain and request assistance  - Assess pain using appropriate pain scale  - Administer analgesics based on type and severity of pain and evaluate response  - Implement non-pharmacological measures as appropriate and evaluate response  - Consider cultural and social influences on pain and pain management  - Notify physician/advanced practitioner if interventions unsuccessful or patient reports new pain  Outcome: Progressing     Problem: DISCHARGE PLANNING  Goal: Discharge to home or other facility with appropriate resources  Description: INTERVENTIONS:  - Identify barriers to discharge w/patient and caregiver  - Arrange for needed discharge resources and transportation as appropriate  - Identify discharge learning needs (meds, wound care, etc.)  - Arrange for interpretive services to assist at discharge as needed  - Refer to Case Management Department for coordinating discharge planning if the patient needs post-hospital services based on physician/advanced practitioner order or complex needs related to functional status, cognitive ability, or social support system  Outcome: Progressing     Problem: Knowledge Deficit  Goal: Patient/family/caregiver demonstrates understanding of disease process, treatment plan, medications, and discharge instructions  Description: Complete learning assessment and assess knowledge base.   Interventions:  - Provide teaching at level of understanding  - Provide teaching via preferred learning methods  Outcome: Progressing     Problem: CARDIOVASCULAR - ADULT  Goal: Absence of cardiac dysrhythmias or at baseline rhythm  Description: INTERVENTIONS:  - Continuous cardiac monitoring, vital signs, obtain 12 lead EKG if ordered  - Administer antiarrhythmic and heart rate control medications as ordered  - Monitor electrolytes and administer replacement therapy as ordered  Outcome: Progressing

## 2023-07-23 NOTE — CONSULTS
[address]  Consult  Name: Isaias Leyva 49 y.o. male I MRN: [id number]  Unit/Bed#: ED 25 I Date of Admission: 7/23/2023   Date of Service: 7/23/2023 I Hospital Day: 0    Consult to cardiology  Consult performed by: Cheko Martin PA-C  Consult ordered by: Elizabeth Mora MD          Assessment/Plan   Chest pain  Assessment & Plan  Presents with stabbing chest pain last lasts 10 seconds in duration   Intermittent from Wednesday     Radiates into the left arm    Associated with SOB (taking his breath away) and fatigue   [place] at rest    Not aggravated or alleviated by anything    CTA: no dissecting Aneurysm,  Asc Ao Aneurysm stable at 4.3 cm   HS troponin negative thus far    Continue serial troponin level   EKG is non-ischemic   Continue current medical management   Patient on Lisinopril and statin    Will add BB to regimen          Thoracic aortic aneurysm [facility])  Assessment & Plan  Ascending aorta stable at 4.3 cm per CT    Mixed hyperlipidemia  Assessment & Plan  Tolerating statin therapy  Continue atorvastatin 80 mg daily    Essential hypertension  Assessment & Plan  Moderately controlled in the setting of chest pain   Continue pre-hospital medications   On lisinopril 40 mg daily   Will add BB and monitor              Summary Comments:  Patient with intermittent chest pain that lasts seconds in duration. Check serial troponin levels. In the meantime continue medical management with Lisinopril and statin. Metoprolol will be added. Thank you for allowing us to see this pleasant patient in consult. We will follow course along with you and make outpatient plans outlined above with all arrangements. Outpatient Cardiologist:     Chief Complaint:  Chief Complaint   Patient presents with   • Chest Pain     Center chest          History of Present Illness: The patient is a 49year old with a PMH of HTN HLD and TAA with ascending aorta measuring 4.3 cm.   He comes into the emergency room with complaints of of chest discomfort since Wednesday of this week. He states that it has been intermittent. When it does come it lasts 10 seconds in duration and is a sharp stabbing pain in the substernal region. It usually radiates into the left arm causing numbness in the arm and the tip of the fingers. When he experiences the pain it "takes his breath away". He experiences this usually at rest.  He did not notice that it came after a heavy meal or during exertion. It can occur at any time. It is not alleviated or aggravated by anything. Patient experienced this several times while he was in the ER. It is of note that his blood pressure is elevated. Patient received Dilaudid in the emergency room and he states that even that did not take his pain away. CT scan shows no dissection of aneurysm and a sending aortic aneurysm is stable at 4.3 cm. EKG is nonischemic and troponins have been negative thus far. Review of Systems: a 10 point review of systems was conducted and is negative except for as mentioned in the HPI or as below. Review of Systems   Constitutional: Negative. HENT: Negative. Eyes: Negative. Cardiovascular: Positive for chest pain. Negative for claudication, cyanosis, dyspnea on exertion, irregular heartbeat, leg swelling, near-syncope, orthopnea, palpitations, paroxysmal nocturnal dyspnea and syncope. Respiratory: Negative. Negative for cough, hemoptysis, shortness of breath, sleep disturbances due to breathing, snoring, sputum production and wheezing. Endocrine: Negative. Hematologic/Lymphatic: Negative. Skin: Negative. Musculoskeletal: Negative. Gastrointestinal: Negative. Genitourinary: Negative. Neurological: Negative. Psychiatric/Behavioral: Negative. Allergic/Immunologic: Negative.         Past Medical and Surgical History:  Past Medical History:   Diagnosis Date   • Ascending aortic aneurysm ([address])    • Asthma    • Hyperlipidemia    • Hypertension    • Testicular cancer ([address] [facility])      Past Surgical History:   Procedure Laterality Date   • HERNIA REPAIR     • LYMPHADENECTOMY     • ORCHIECTOMY      and LN dissection   • ROTATOR CUFF REPAIR       Social History     Substance and Sexual Activity   Alcohol Use Yes    Comment: few beers a or 2 month     Social History     Substance and Sexual Activity   Drug Use Never     Social History     Tobacco Use   Smoking Status Former   • Packs/day: 0.50   • Years: 1.00   • Total pack years: 0.50   • Types: Cigarettes   • Start date: 1992   • Quit date: 10/5/1993   • Years since quittin.8   Smokeless Tobacco Never       Family History:  Family History   Problem Relation Age of Onset   • Cancer Mother    • Hypertension Father    • Thyroid disease Sister    • No Known Problems Daughter        Medication:    Current Facility-Administered Medications:   •  metoprolol tartrate (LOPRESSOR) tablet 25 mg, 25 mg, Oral, Q12H Coby GEORGES PA-C    Current Outpatient Medications:   •  atorvastatin (LIPITOR) 80 mg tablet, , Disp: , Rfl:   •  Cholecalciferol 1.25 MG (02952 UT) TABS, , Disp: , Rfl:   •  ergocalciferol (VITAMIN D2) 50,000 units, Take 50,000 Units by mouth, Disp: , Rfl:   •  lisinopril (ZESTRIL) 10 mg tablet, Take 40 mg by mouth (Patient not taking: Reported on 2023), Disp: , Rfl:   •  lisinopril (ZESTRIL) 40 mg tablet, Take 40 mg by mouth daily, Disp: , Rfl:   •  meloxicam (MOBIC) 15 mg tablet, Take 1 tablet (15 mg total) by mouth daily (Patient not taking: Reported on 2022), Disp: 10 tablet, Rfl: 0  •  pantoprazole (PROTONIX) 20 mg tablet, Take 20 mg by mouth daily (Patient not taking: Reported on 2022), Disp: , Rfl:   •  rosuvastatin (CRESTOR) 40 MG tablet, Take 40 mg by mouth daily, Disp: , Rfl:      Allergies:   Allergies   Allergen Reactions   • Ibuprofen Other (See Comments) and GI Bleeding     Other reaction(s): severe chest pain  800mg  800mg  Other reaction(s): Free Text  800mg • Cat Hair Extract Wheezing   • Grass Extracts [Gramineae Pollens] Headache     Eyes water   • Lidocaine Hives     rash   • Pollen Extract Sneezing   • Bee Pollen Rash   • Dust Mite Extract Sneezing   • Mold Extract [Trichophyton] Sneezing       Physical Exam:  Vitals: Blood pressure 144/81, pulse (!) 53, temperature 98.3 °F (36.8 °C), temperature source Tympanic, resp. rate 16, SpO2 94 %. , There is no height or weight on file to calculate BMI.,   Orthostatic Blood Pressures    Flowsheet Row Most Recent Value   Blood Pressure 144/81 filed at 07/23/2023 1503      , Systolic (59IMY), GWB:225 , Min:136 , BTM:316   , Diastolic (75ZWH), DGY:87, Min:70, Max:90    Physical Exam  Vitals and nursing note reviewed. Constitutional:       Appearance: He is well-developed. He is obese. HENT:      Head: Normocephalic and atraumatic. Mouth/Throat:      Mouth: Mucous membranes are moist.   Eyes:      General: No scleral icterus. Conjunctiva/sclera: Conjunctivae normal.   Neck:      Thyroid: No thyromegaly. Vascular: No JVD. Cardiovascular:      Rate and Rhythm: Normal rate and regular rhythm. Heart sounds: Normal heart sounds, S1 normal and S2 normal. No murmur heard. No friction rub. No gallop. Pulmonary:      Effort: No respiratory distress. Breath sounds: No wheezing or rales. Abdominal:      General: Bowel sounds are normal. There is no distension. Palpations: Abdomen is soft. Tenderness: There is no abdominal tenderness. Comments: Aorta not palpable   Musculoskeletal:         General: No tenderness or deformity. Normal range of motion. Cervical back: Normal range of motion and neck supple. Right lower leg: No edema. Left lower leg: No edema. Skin:     General: Skin is warm and dry. Neurological:      General: No focal deficit present. Mental Status: He is alert and oriented to person, place, and time.    Psychiatric:         Mood and Affect: Mood normal.         Behavior: Behavior normal.         Judgment: Judgment normal.           Most Recent Cardiac Imaging:   Echo 5/30/2023: Normal LVEF 55% no WMA aortic root mildly dilated 3.8 cm ascending aorta dilated at 4.3 cm mild AI mild MR    EKG/Telemetry: Normal sinus rhythm    7/23/2023:Sinus bradycardia  Left axis deviation  Possible Inferior infarct , age undetermined      Lab Results:   Troponins:   Results from last 7 days   Lab Units 07/23/23  1019   HS TNI 0HR ng/L 2      BNP:    CBC with diff:   Results from last 7 days   Lab Units 07/23/23  1019   WBC Thousand/uL 6.81   HEMOGLOBIN g/dL 13.6   HEMATOCRIT % 41.1   PLATELETS Thousands/uL 217   RBC Million/uL 4.70     CMP:  Results from last 7 days   Lab Units 07/23/23  1019   POTASSIUM mmol/L 3.9   CHLORIDE mmol/L 102   BUN mg/dL 17   CREATININE mg/dL 0.84   CALCIUM mg/dL 9.4   AST U/L 17   ALT U/L 21   ALK PHOS U/L 47   EGFR ml/min/1.73sq m 103     Lipid Profile:

## 2023-07-23 NOTE — H&P
[address]  H&P  Name: Tarik Quan 49 y.o. male I MRN: [id number]  Unit/Bed#: -01 I Date of Admission: 7/23/2023   Date of Service: 7/23/2023 I Hospital Day: 0      Assessment/Plan   * Chest pain  Assessment & Plan  · Patient with intermittent chest pain since Wednesday 7/19/23  · JULIA: 0  · Troponin: 2 -> 2  · EKG- no ischemic changes  · Continue home regimen of lisinopril and statin  · Patient started on lopressor 25 mg po BID and Imdur 30 mg po q 24 hours by cardiology  · Telemetry monitoring  · Cardiology consultation appreciated  · Labs in am    Thoracic aortic aneurysm [facility])  Assessment & Plan  · CTA dissection protocol: Stable fusiform ectasia of the ascending thoracic aorta measuring up to 43 mm. Recommendation is for continued surveillance with follow-up low radiation dose chest CT in one year. Essential hypertension  Assessment & Plan  · Blood pressure currently controlled  · Continues patient's home regimen of lisinopril  · Lopressor 25 mg po BID added by cardiology  · Continue to monitor blood pressure trends    Mixed hyperlipidemia  Assessment & Plan  · Continue statin therapy         VTE Pharmacologic Prophylaxis: VTE Score: 2 Low Risk (Score 0-2) - Encourage Ambulation. Code Status: Level 1 - Full Code   Discussion with family: Patient declined call to . Anticipated Length of Stay: Patient will be admitted on an observation basis with an anticipated length of stay of less than 2 midnights secondary to monitoring and workup of chest pain. Total Time Spent on Date of Encounter in care of patient: 65 minutes This time was spent on one or more of the following: performing physical exam; counseling and coordination of care; obtaining or reviewing history; documenting in the medical record; reviewing/ordering tests, medications or procedures; communicating with other healthcare professionals and discussing with patient's family/caregivers.     Chief Complaint: chest pain    History of Present Illness:  Pam Larry is a 49 y.o. male with a PMH of HTN, HLD who presents with a complaint of chest pain. The patient states the pain started on Wednesday. He states the pain is in the middle of his chest and radiates down his left arm. He states that at this time when this occurs his arm feels heavy. The patient describes the pain as sharp and intermittent. Patient states that when the pain occurs he has trouble catching his breath. He states that the chest pain lasts for about 10 seconds. He states the amount of times that it has been occurring has increasing since Wednesday. No alleviating or aggravating factors. He denies any diaphoresis or nausea during this time. The patient has a history of thoracic aneurysm. CAT scan of the chest was negative for dissection. EKG negative for ischemic changes. Troponin's negative so far. Review of Systems:  Review of Systems   Cardiovascular: Positive for chest pain. All other systems reviewed and are negative. Past Medical and Surgical History:   Past Medical History:   Diagnosis Date   • Ascending aortic aneurysm ([address])    • Asthma    • Hyperlipidemia    • Hypertension    • Testicular cancer [facility])        Past Surgical History:   Procedure Laterality Date   • HERNIA REPAIR     • LYMPHADENECTOMY     • ORCHIECTOMY      and LN dissection   • ROTATOR CUFF REPAIR         Meds/Allergies:  Prior to Admission medications    Medication Sig Start Date End Date Taking?  Authorizing Provider   lisinopril (ZESTRIL) 40 mg tablet Take 40 mg by mouth daily 5/31/23  Yes Historical Provider, MD   rosuvastatin (CRESTOR) 40 MG tablet Take 40 mg by mouth daily 5/29/23  Yes Historical Provider, MD   ergocalciferol (VITAMIN D2) 50,000 units Take 50,000 Units by mouth every 14 (fourteen) days 5/29/23   Historical Provider, MD   atorvastatin (LIPITOR) 80 mg tablet   7/23/23  Historical Provider, MD   Cholecalciferol 1.25 MG (69621 UT) TABS  20  Historical Provider, MD   lisinopril (ZESTRIL) 10 mg tablet Take 40 mg by mouth  Patient not taking: Reported on 2023  Historical Provider, MD   meloxicam (MOBIC) 15 mg tablet Take 1 tablet (15 mg total) by mouth daily  Patient not taking: Reported on 2022  Sonu Hendricks MD   pantoprazole (PROTONIX) 20 mg tablet Take 20 mg by mouth daily  Patient not taking: Reported on 2022  Historical Provider, MD     I have reviewed home medications with patient personally. Allergies:    Allergies   Allergen Reactions   • Ibuprofen Other (See Comments) and GI Bleeding     Other reaction(s): severe chest pain  800mg  800mg  Other reaction(s): Free Text  800mg   • Cat Hair Extract Wheezing   • Grass Extracts [Gramineae Pollens] Headache     Eyes water   • Lidocaine Hives     rash   • Pollen Extract Sneezing   • Bee Pollen Rash   • Dust Mite Extract Sneezing   • Mold Extract [Trichophyton] Sneezing       Social History:  Marital Status: /Civil Union   Occupation:    Patient Pre-hospital Living Situation: Home  Patient Pre-hospital Level of Mobility: walks  Patient Pre-hospital Diet Restrictions: none  Substance Use History:   Social History     Substance and Sexual Activity   Alcohol Use Yes    Comment: few beers a or 2 month     Social History     Tobacco Use   Smoking Status Former   • Packs/day: 0.50   • Years: 1.00   • Total pack years: 0.50   • Types: Cigarettes   • Start date: 1992   • Quit date: 10/5/1993   • Years since quittin.8   Smokeless Tobacco Never     Social History     Substance and Sexual Activity   Drug Use Never       Family History:  Family History   Problem Relation Age of Onset   • Cancer Mother    • Hypertension Father    • Thyroid disease Sister    • No Known Problems Daughter        Physical Exam:     Vitals:   Blood Pressure: 140/85 (23 1407)  Pulse: (!) 46 (23 1407)  Temperature: 98.3 °F (36.8 °C) (07/23/23 1010)  Temp Source: Tympanic (07/23/23 1010)  Respirations: 16 (07/23/23 1245)  SpO2: 97 % (07/23/23 1407)    Physical Exam  Vitals and nursing note reviewed. Constitutional:       General: He is awake. Appearance: Normal appearance. HENT:      Head: Normocephalic and atraumatic. Cardiovascular:      Rate and Rhythm: Normal rate and regular rhythm. Heart sounds: Normal heart sounds. Pulmonary:      Effort: Pulmonary effort is normal.      Breath sounds: Normal breath sounds. Abdominal:      Palpations: Abdomen is soft. Tenderness: There is no abdominal tenderness. Skin:     General: Skin is warm and dry. Neurological:      General: No focal deficit present. Mental Status: He is alert and oriented to person, place, and time. Psychiatric:         Attention and Perception: Attention normal.         Mood and Affect: Mood normal.         Speech: Speech normal.         Behavior: Behavior is cooperative.           Additional Data:     Lab Results:  Results from last 7 days   Lab Units 07/23/23  1019   WBC Thousand/uL 6.81   HEMOGLOBIN g/dL 13.6   HEMATOCRIT % 41.1   PLATELETS Thousands/uL 217   NEUTROS PCT % 73   LYMPHS PCT % 20   MONOS PCT % 6   EOS PCT % 1     Results from last 7 days   Lab Units 07/23/23  1019   SODIUM mmol/L 138   POTASSIUM mmol/L 3.9   CHLORIDE mmol/L 102   CO2 mmol/L 27   BUN mg/dL 17   CREATININE mg/dL 0.84   ANION GAP mmol/L 9   CALCIUM mg/dL 9.4   ALBUMIN g/dL 4.6   TOTAL BILIRUBIN mg/dL 0.82   ALK PHOS U/L 47   ALT U/L 21   AST U/L 17   GLUCOSE RANDOM mg/dL 83                       Lines/Drains:  Invasive Devices     Peripheral Intravenous Line  Duration           Peripheral IV 07/23/23 Left Antecubital <1 day    Peripheral IV 07/23/23 Right Antecubital <1 day                    Imaging: Reviewed radiology reports from this admission including: abdominal/pelvic CT  CTA dissection protocol chest/abdomen/pelvis   Final Result by Jewels Rojas Vito Truong MD (07/23 1218)      No aortic dissection or intramural hematoma. Stable fusiform ectasia of the ascending thoracic aorta measuring up to 43 mm. Recommendation is for continued surveillance with follow-up low radiation dose chest CT in one year. No lymphadenopathy or evidence of metastatic disease in the chest, abdomen, and pelvis. I personally discussed this study with PIERRE MARTÍNEZ on 7/23/2023 12:09 PM.            Workstation performed: [id number]         XR chest 1 view portable    (Results Pending)       EKG and Other Studies Reviewed on Admission:   · EKG: Sinus Bradycardia. HR 59.    ** Please Note: This note has been constructed using a voice recognition system.  **

## 2023-07-23 NOTE — ASSESSMENT & PLAN NOTE
· CTA dissection protocol: Stable fusiform ectasia of the ascending thoracic aorta measuring up to 43 mm. Recommendation is for continued surveillance with follow-up low radiation dose chest CT in one year.

## 2023-07-23 NOTE — ASSESSMENT & PLAN NOTE
Moderately controlled in the setting of chest pain   Continue pre-hospital medications   On lisinopril 40 mg daily   Will add BB and monitor

## 2023-07-23 NOTE — EMTALA/ACUTE CARE TRANSFER
[address]  [address] [facility] [address]  Dept: [phone]      EMTALA TRANSFER CONSENT    NAME Tim Hutton                                         1975                              MRN [id number]    I have been informed of my rights regarding examination, treatment, and transfer   by Dr. Genevieve Moore MD    Benefits:      Risks:        Consent for Transfer:  I acknowledge that my medical condition has been evaluated and explained to me by the emergency department physician or other qualified medical person and/or my attending physician, who has recommended that I be transferred to the service of    at  . The above potential benefits of such transfer, the potential risks associated with such transfer, and the probable risks of not being transferred have been explained to me, and I fully understand them. The doctor has explained that, in my case, the benefits of transfer outweigh the risks. I agree to be transferred. I authorize the performance of emergency medical procedures and treatments upon me in both transit and upon arrival at the receiving facility. Additionally, I authorize the release of any and all medical records to the receiving facility and request they be transported with me, if possible. I understand that the safest mode of transportation during a medical emergency is an ambulance and that the Hospital advocates the use of this mode of transport. Risks of traveling to the receiving facility by car, including absence of medical control, life sustaining equipment, such as oxygen, and medical personnel has been explained to me and I fully understand them. (MARITZA CORRECT BOX BELOW)  [  ]  I consent to the stated transfer and to be transported by ambulance/helicopter. [  ]  I consent to the stated transfer, but refuse transportation by ambulance and accept full responsibility for my transportation by car.   I understand the risks of non-ambulance transfers and I exonerate the Hospital and its staff from any deterioration in my condition that results from this refusal.    X___________________________________________    DATE  23  TIME________  Signature of patient or legally responsible individual signing on patient behalf           RELATIONSHIP TO PATIENT_________________________          Provider Certification    NAME Linette Murrell                                        [facility] 1975                              MRN [id number]    A medical screening exam was performed on the above named patient. Based on the examination:    Condition Necessitating Transfer There were no encounter diagnoses. Patient Condition:      Reason for Transfer:      Transfer Requirements: Facility     · Space available and qualified personnel available for treatment as acknowledged by    · Agreed to accept transfer and to provide appropriate medical treatment as acknowledged by          · Appropriate medical records of the examination and treatment of the patient are provided at the time of transfer   [address] _______  · Transfer will be performed by qualified personnel from    and appropriate transfer equipment as required, including the use of necessary and appropriate life support measures.     Provider Certification: I have examined the patient and explained the following risks and benefits of being transferred/refusing transfer to the patient/family:         Based on these reasonable risks and benefits to the patient and/or the unborn child(esperanza), and based upon the information available at the time of the patient’s examination, I certify that the medical benefits reasonably to be expected from the provision of appropriate medical treatments at another medical facility outweigh the increasing risks, if any, to the individual’s medical condition, and in the case of labor to the unborn child, from effecting the transfer.     X____________________________________________ DATE 07/23/23        TIME_______      ORIGINAL - SEND TO MEDICAL RECORDS   COPY - SEND WITH PATIENT DURING TRANSFER

## 2023-07-23 NOTE — ASSESSMENT & PLAN NOTE
Presents with stabbing chest pain last lasts 10 seconds in duration   Intermittent from Wednesday     Radiates into the left arm    Associated with SOB (taking his breath away) and fatigue   Straughn at rest    Not aggravated or alleviated by anything    CTA: no dissecting Aneurysm,  Asc Ao Aneurysm stable at 4.3 cm   HS troponin negative thus far    Continue serial troponin level   EKG is non-ischemic   Continue current medical management   Patient on Lisinopril and statin    Will add BB to regimen

## 2023-07-23 NOTE — ASSESSMENT & PLAN NOTE
· Blood pressure currently controlled  · Continues patient's home regimen of lisinopril  · Lopressor 25 mg po BID added by cardiology  · Continue to monitor blood pressure trends

## 2023-07-23 NOTE — ED PROVIDER NOTES
History  Chief Complaint   Patient presents with   • Chest Pain     Center chest       HPI  D5year-old male with a history of a thoracic aortic aneurysm presented with complaint of chest pain ongoing since Wednesday. He states that he works as a  but has not been doing any much heavy lifting. He states that his chest pain started on Wednesday has been intermittent and felt that was worse today which prompted his ED visit. States that he feels pain now on movement. Rating his pain 8 out of 10 at this time. He also states that he does not have much energy and has been just laying around per his wife. Has some shortness of breath which is associated with chest pain. Otherwise awake alert oriented times GCS 15. Prior to Admission Medications   Prescriptions Last Dose Informant Patient Reported? Taking?    Cholecalciferol 1.25 MG (45405 UT) TABS  Self Yes No   atorvastatin (LIPITOR) 80 mg tablet  Self Yes No   Patient not taking: Reported on 6/5/2023   ergocalciferol (VITAMIN D2) 50,000 units   Yes No   Sig: Take 50,000 Units by mouth   lisinopril (ZESTRIL) 10 mg tablet  Self Yes No   Sig: Take 40 mg by mouth   Patient not taking: Reported on 6/5/2023   lisinopril (ZESTRIL) 40 mg tablet   Yes No   Sig: Take 40 mg by mouth daily   meloxicam (MOBIC) 15 mg tablet  Self No No   Sig: Take 1 tablet (15 mg total) by mouth daily   Patient not taking: Reported on 6/8/2022   pantoprazole (PROTONIX) 20 mg tablet  Self Yes No   Sig: Take 20 mg by mouth daily   Patient not taking: Reported on 6/8/2022   rosuvastatin (CRESTOR) 40 MG tablet   Yes No   Sig: Take 40 mg by mouth daily      Facility-Administered Medications: None       Past Medical History:   Diagnosis Date   • Ascending aortic aneurysm (HCC)    • Asthma    • Hyperlipidemia    • Hypertension    • Testicular cancer [facility])        Past Surgical History:   Procedure Laterality Date   • HERNIA REPAIR     • LYMPHADENECTOMY     • ORCHIECTOMY      and LN dissection • ROTATOR CUFF REPAIR         Family History   Problem Relation Age of Onset   • Cancer Mother    • Hypertension Father    • Thyroid disease Sister    • No Known Problems Daughter      I have reviewed and agree with the history as documented. E-Cigarette/Vaping   • E-Cigarette Use Never User      E-Cigarette/Vaping Substances   • Nicotine No    • THC No    • CBD No    • Flavoring No    • Other No    • Unknown No      Social History     Tobacco Use   • Smoking status: Former     Packs/day: 0.50     Years: 1.00     Total pack years: 0.50     Types: Cigarettes     Start date: 1992     Quit date: 10/5/1993     Years since quittin.8   • Smokeless tobacco: Never   Vaping Use   • Vaping Use: Never used   Substance Use Topics   • Alcohol use: Yes     Comment: few beers a or 2 month   • Drug use: Never       Review of Systems   Constitutional: Positive for fatigue. HENT: Negative. Eyes: Negative. Respiratory: Negative. Cardiovascular: Positive for chest pain. Gastrointestinal: Negative. Endocrine: Negative. Genitourinary: Negative. Musculoskeletal: Negative. Skin: Negative. Allergic/Immunologic: Negative. Neurological: Negative. Hematological: Negative. Psychiatric/Behavioral: Negative. Physical Exam  Physical Exam  Vitals and nursing note reviewed. Constitutional:       General: He is not in acute distress. Appearance: He is well-developed. He is not diaphoretic. HENT:      Head: Normocephalic and atraumatic. Right Ear: External ear normal.      Left Ear: External ear normal.      Nose: Nose normal.   Eyes:      Conjunctiva/sclera: Conjunctivae normal.      Pupils: Pupils are equal, round, and reactive to light. Cardiovascular:      Rate and Rhythm: Normal rate and regular rhythm. Pulmonary:      Effort: Pulmonary effort is normal. No respiratory distress. Breath sounds: Normal breath sounds. No wheezing.    Abdominal:      General: There is no distension. Tenderness: There is no abdominal tenderness. Musculoskeletal:         General: Normal range of motion. Cervical back: Normal range of motion and neck supple. Lymphadenopathy:      Cervical: No cervical adenopathy. Skin:     General: Skin is warm and dry. Capillary Refill: Capillary refill takes less than 2 seconds. Neurological:      General: No focal deficit present. Mental Status: He is alert and oriented to person, place, and time.    Psychiatric:         Behavior: Behavior normal.         Vital Signs  ED Triage Vitals   Temperature Pulse Respirations Blood Pressure SpO2   07/23/23 1010 07/23/23 1010 07/23/23 1017 07/23/23 1017 07/23/23 1010   98.3 °F (36.8 °C) 60 17 136/79 94 %      Temp Source Heart Rate Source Patient Position - Orthostatic VS BP Location FiO2 (%)   07/23/23 1010 -- -- -- --   Tympanic          Pain Score       07/23/23 1010       5           Vitals:    07/23/23 1215 07/23/23 1230 07/23/23 1245 07/23/23 1315   BP: 154/89 154/90 144/81 129/76   Pulse: (!) 54 55 (!) 53 (!) 50         Visual Acuity      ED Medications  Medications   metoprolol tartrate (LOPRESSOR) tablet 25 mg (25 mg Oral Not Given 7/23/23 1325)   isosorbide mononitrate (IMDUR) 24 hr tablet 30 mg (has no administration in time range)   HYDROmorphone (DILAUDID) injection 0.5 mg (0.5 mg Intravenous Given 7/23/23 1104)   iohexol (OMNIPAQUE) 350 MG/ML injection (SINGLE-DOSE) 100 mL (100 mL Intravenous Given 7/23/23 1119)   HYDROmorphone (DILAUDID) injection 1 mg (1 mg Intravenous Given 7/23/23 1159)       Diagnostic Studies  Results Reviewed     Procedure Component Value Units Date/Time    HS Troponin I 2hr [[id number]]  (Normal) Collected: 07/23/23 1222    Lab Status: Final result Specimen: Blood from Arm, Right Updated: 07/23/23 1320     hs TnI 2hr 2 ng/L      Delta 2hr hsTnI 0 ng/L     HS Troponin I 4hr [[id number]]     Lab Status: No result Specimen: Blood     FLU/RSV/COVID - if FLU/RSV clinically relevant [[id number]]  (Normal) Collected: 07/23/23 1104    Lab Status: Final result Specimen: Nares from Nose Updated: 07/23/23 1154     SARS-CoV-2 Negative     INFLUENZA A PCR Negative     INFLUENZA B PCR Negative     RSV PCR Negative    Narrative:      FOR PEDIATRIC PATIENTS - copy/paste COVID Guidelines URL to browser: [email or web address]    SARS-CoV-2 assay is a Nucleic Acid Amplification assay intended for the  qualitative detection of nucleic acid from SARS-CoV-2 in nasopharyngeal  swabs. Results are for the presumptive identification of SARS-CoV-2 RNA. Positive results are indicative of infection with SARS-CoV-2, the virus  causing COVID-19, but do not rule out bacterial infection or co-infection  with other viruses. Laboratories within the [facility] and its  [facility] are required to report all positive results to the appropriate  public health authorities. Negative results do not preclude SARS-CoV-2  infection and should not be used as the sole basis for treatment or other  patient management decisions. Negative results must be combined with  clinical observations, patient history, and epidemiological information. This test has not been FDA cleared or approved. This test has been authorized by FDA under an Emergency Use Authorization  (EUA). This test is only authorized for the duration of time the  declaration that circumstances exist justifying the authorization of the  emergency use of an in vitro diagnostic tests for detection of SARS-CoV-2  virus and/or diagnosis of COVID-19 infection under section 564(b)(1) of  the Act, 21 U. S.C. 350TFG-5(E)(8), unless the authorization is terminated  or revoked sooner. The test has been validated but independent review by FDA  and CLIA is pending. Test performed using [employer] GeneXpert: This RT-PCR assay targets N2,  a region unique to SARS-CoV-2.  A conserved region in the E-gene was chosen  for pan-Sarbecovirus detection which includes SARS-CoV-2. According to CMS-2020-01-R, this platform meets the definition of high-throughput technology.     Comprehensive metabolic panel [[id number]] Collected: 07/23/23 1019    Lab Status: Final result Specimen: Blood from Arm, Left Updated: 07/23/23 1050     Sodium 138 mmol/L      Potassium 3.9 mmol/L      Chloride 102 mmol/L      CO2 27 mmol/L      ANION GAP 9 mmol/L      BUN 17 mg/dL      Creatinine 0.84 mg/dL      Glucose 83 mg/dL      Calcium 9.4 mg/dL      AST 17 U/L      ALT 21 U/L      Alkaline Phosphatase 47 U/L      Total Protein 7.4 g/dL      Albumin 4.6 g/dL      Total Bilirubin 0.82 mg/dL      eGFR 103 ml/min/1.73sq m     Narrative:      National Kidney Disease Foundation guidelines for Chronic Kidney Disease (CKD):   •  Stage 1 with normal or high GFR (GFR > 90 mL/min/1.73 square meters)  •  Stage 2 Mild CKD (GFR = 60-89 mL/min/1.73 square meters)  •  Stage 3A Moderate CKD (GFR = 45-59 mL/min/1.73 square meters)  •  Stage 3B Moderate CKD (GFR = 30-44 mL/min/1.73 square meters)  •  Stage 4 Severe CKD (GFR = 15-29 mL/min/1.73 square meters)  •  Stage 5 End Stage CKD (GFR <15 mL/min/1.73 square meters)  Note: GFR calculation is accurate only with a steady state creatinine    HS Troponin 0hr (reflex protocol) [[id number]]  (Normal) Collected: 07/23/23 1019    Lab Status: Final result Specimen: Blood from Arm, Left Updated: 07/23/23 1050     hs TnI 0hr 2 ng/L     CBC and differential [[id number]] Collected: 07/23/23 1019    Lab Status: Final result Specimen: Blood from Arm, Left Updated: 07/23/23 1029     WBC 6.81 Thousand/uL      RBC 4.70 Million/uL      Hemoglobin 13.6 g/dL      Hematocrit 41.1 %      MCV 87 fL      MCH 28.9 pg      MCHC 33.1 g/dL      RDW 12.0 %      MPV 9.5 fL      Platelets 592 Thousands/uL      nRBC 0 /100 WBCs      Neutrophils Relative 73 %      Immat GRANS % 0 %      Lymphocytes Relative 20 %      Monocytes Relative 6 % Eosinophils Relative 1 %      Basophils Relative 0 %      Neutrophils Absolute 4.93 Thousands/µL      Immature Grans Absolute 0.02 Thousand/uL      Lymphocytes Absolute 1.38 Thousands/µL      Monocytes Absolute 0.39 Thousand/µL      Eosinophils Absolute 0.08 Thousand/µL      Basophils Absolute 0.01 Thousands/µL                  CTA dissection protocol chest/abdomen/pelvis   Final Result by Amanda Gomez MD (07/23 1218)      No aortic dissection or intramural hematoma. Stable fusiform ectasia of the ascending thoracic aorta measuring up to 43 mm. Recommendation is for continued surveillance with follow-up low radiation dose chest CT in one year. No lymphadenopathy or evidence of metastatic disease in the chest, abdomen, and pelvis. I personally discussed this study with PIERRE MARTÍNEZ on 7/23/2023 12:09 PM.            Workstation performed: [id number]         XR chest 1 view portable    (Results Pending)              Procedures  Procedures         ED Course  ED Course as of 07/23/23 1344   Sun Jul 23, 2023   1220 I initially reviewed the CTA appears to be extending dissection , spoke with the cardiothoracic surgery on-call who accepted the transfer to [facility]. [phone] Just spoke with radiology states that it appears to be artifact and not to dissection. Reached back to pack who discussed the case with the cardiothoracic on-call Demond Holder recommending to cancel the transfer at this time. [phone] Patient and family was informed regarding canceling dissection. Cardiology team at bedside. 1233 Pt continue to have worsening chest pain, no relief from dilaudid    1308 Patient is seen by cardiology team   [address] [place] [id number] texted internal medicine team waiting for callback. 36 Francine with internal medicine team accepted the admission for observation.              HEART Risk Score    Flowsheet Row Most Recent Value   Heart Score Risk Calculator    History 1 Filed at: 07/23/2023 1321 ECG 1 Filed at: 07/23/2023 1321   Age 1 Filed at: 07/23/2023 1321   Risk Factors 1 Filed at: 07/23/2023 1321   Troponin 0 Filed at: 07/23/2023 1321   HEART Score 4 Filed at: 07/23/2023 1321                                      Medical Decision Making  This is a 48-year-old male with history of ascending aortic aneurysm patient with history as above presented with chest pain. History obtained from patient and wife was by bedside. States that has been feeling fatigue pain that started Wednesday and has been getting worse which prompted this ED visit. Also had some associated shortness of breath. Patient was nontoxic, stable. Ambulatory. Exam as above.  Concerning for aortic dissection/rupture/MI/pneumothorax/pulmonary embolism. Will obtain labs CBC BMP troponin EKG. EKG reviewed.   Labs reviewed.   Independently reviewed imaging.  Appears to be a sending dissection discussed with the critical care on call also see dissection PAC referral was placed to speak with cardiothoracic surgeon. Please see my documentation in the ED course. After speaking with radiology and cardiothoracic surgeon we are going to cancel transfer at this time since that they both to agree and think that it is artifact and not a true dissection. This was discussed with patient. Patient continued to have chest pain despite on pain medication. Discussed with cardiology patient is seen by cardiology in the ED. Recommending admission to trend troponin. Reviewed external records.     Disposition admitted to internal medicine team      Amount and/or Complexity of Data Reviewed  Labs: ordered. Radiology: ordered. Risk  Prescription drug management. Decision regarding hospitalization.           Disposition  Final diagnoses:   Chest pain, unspecified type     Time reflects when diagnosis was documented in both MDM as applicable and the Disposition within this note     Time User Action Codes Description Comment    7/23/2023  1:00 PM Manfred Woodruff Add [R07.9] Chest pain, unspecified type       ED Disposition     ED Disposition   Admit    Condition   Stable    Date/Time   Sun Jul 23, 2023  1:20 PM    Comment   Will be admitted to [address] [place] [address] Most Recent Value   Patient Condition The patient has been stabilized such that within reasonable medical probability, no material deterioration of the patient condition or the condition of the unborn child(esperanza) is likely to result from the transfer   Reason for Transfer Level of Care needed not available at this facility   Benefits of Transfer Specialized equipment and/or services available at the receiving facility (Include comment)________________________   Risks of Transfer Potential for delay in receiving treatment, Potential deterioration of medical condition, Possible worsening of condition or death during transfer, Increased discomfort during transfer, Loss of IV   Accepting Facility Name, [address] [place] [address]   Sending  [address]      RN Documentation    [address] Name, [address]    None         Patient's Medications   Discharge Prescriptions    No medications on file       No discharge procedures on file.     PDMP Review     None          ED Provider  Electronically Signed by           Manfred Woodruff MD  07/23/23 6004

## 2023-07-24 VITALS
DIASTOLIC BLOOD PRESSURE: 65 MMHG | RESPIRATION RATE: 18 BRPM | HEART RATE: 78 BPM | SYSTOLIC BLOOD PRESSURE: 109 MMHG | HEIGHT: 78 IN | WEIGHT: 293.76 LBS | TEMPERATURE: 97.8 F | OXYGEN SATURATION: 94 % | BODY MASS INDEX: 33.99 KG/M2

## 2023-07-24 PROCEDURE — 99232 SBSQ HOSP IP/OBS MODERATE 35: CPT | Performed by: INTERNAL MEDICINE

## 2023-07-24 PROCEDURE — 99239 HOSP IP/OBS DSCHRG MGMT >30: CPT | Performed by: HOSPITALIST

## 2023-07-24 RX ORDER — ISOSORBIDE MONONITRATE 30 MG/1
60 TABLET, EXTENDED RELEASE ORAL DAILY
Status: DISCONTINUED | OUTPATIENT
Start: 2023-07-24 | End: 2023-07-24 | Stop reason: HOSPADM

## 2023-07-24 RX ORDER — ISOSORBIDE MONONITRATE 60 MG/1
60 TABLET, EXTENDED RELEASE ORAL DAILY
Qty: 30 TABLET | Refills: 0 | Status: SHIPPED | OUTPATIENT
Start: 2023-07-24 | End: 2023-08-23

## 2023-07-24 RX ADMIN — ASPIRIN 81 MG: 81 TABLET, COATED ORAL at 09:56

## 2023-07-24 RX ADMIN — LISINOPRIL 40 MG: 20 TABLET ORAL at 09:56

## 2023-07-24 RX ADMIN — ACETAMINOPHEN 650 MG: 325 TABLET ORAL at 12:17

## 2023-07-24 RX ADMIN — ISOSORBIDE MONONITRATE 60 MG: 30 TABLET, EXTENDED RELEASE ORAL at 09:56

## 2023-07-24 NOTE — PROGRESS NOTES
[address]  Progress Note  Name: Catalina Odom  MRN: [id number]  Unit/Bed#: -01 I Date of Admission: 7/23/2023   Date of Service: 7/24/2023 I Hospital Day: 0    Assessment/Plan   Thoracic aortic aneurysm [facility])  Assessment & Plan  Ascending aorta stable at 4.3 cm per CT  Continue to control BP     Mixed hyperlipidemia  Assessment & Plan  Tolerating statin therapy  Continue atorvastatin 80 mg daily    Essential hypertension  Assessment & Plan  Moderately controlled in the setting of chest pain   Continue pre-hospital medications   On lisinopril 40 mg daily        * Chest pain  Assessment & Plan  Presents with stabbing chest pain last lasts 10 seconds in duration   Intermittent from Wednesday     Radiates into the left arm    Associated with SOB (taking his breath away) and fatigue   [place] at rest    Not aggravated or alleviated by anything    CTA: no dissecting Aneurysm,  Asc Ao Aneurysm stable at 4.3 cm   HS troponin negative  EKG is non-ischemic   Patient on Lisinopril and statin   Did not tolerate BB due to bradycardia  Pain alleviated with Isosorbide    We will arrange OP follow up                 Summary Comments:  Patient is feeling better today. Pain is alleviated with isosorbide. Ok to DC to home on Isosorbide 60 mg daily. We will arrange OP stress testing and follow up. Outpatient Cardiologist: Dr. Alexandra Pham    Subjective:  Patient seen and examined at the bedside. No events overnight. Feeling better. No chest pain dyspnea, or palpitations. Continues to complain of fatigue. Objective:   Vitals: Blood pressure 121/68, pulse 75, temperature 98.1 °F (36.7 °C), resp.  rate 18, height 6' 9" (2.057 m), weight 133 kg (293 lb 12.2 oz), SpO2 97 %.,   Orthostatic Blood Pressures    Flowsheet Row Most Recent Value   Blood Pressure 121/68 filed at 07/24/2023 0735   Patient Position - Orthostatic VS Lying filed at 07/23/2023 3697          Telemetry/ECG/Cardiac Testing:   Echo 5/30/2023: Normal LVEF 55% no WMA aortic root mildly dilated 3.8 cm ascending aorta dilated at 4.3 cm mild AI mild MR     EKG/Telemetry: Normal sinus rhythm     7/23/2023:Sinus bradycardia  Left axis deviation  Possible Inferior infarct , age undetermined    Physical Exam:  Physical Exam  Vitals and nursing note reviewed. Constitutional:       Appearance: He is well-developed. He is obese. HENT:      Head: Normocephalic and atraumatic. Mouth/Throat:      Mouth: Mucous membranes are moist.   Eyes:      General: No scleral icterus. Conjunctiva/sclera: Conjunctivae normal.   Neck:      Thyroid: No thyromegaly. Vascular: No JVD. Cardiovascular:      Rate and Rhythm: Normal rate and regular rhythm. Heart sounds: Normal heart sounds, S1 normal and S2 normal. No murmur heard. No friction rub. No gallop. Pulmonary:      Effort: No respiratory distress. Breath sounds: No wheezing or rales. Abdominal:      General: Bowel sounds are normal. There is no distension. Palpations: Abdomen is soft. Tenderness: There is no abdominal tenderness. Comments: Aorta not palpable   Musculoskeletal:         General: No tenderness or deformity. Normal range of motion. Cervical back: Normal range of motion and neck supple. Right lower leg: No edema. Left lower leg: No edema. Skin:     General: Skin is warm and dry. Neurological:      General: No focal deficit present. Mental Status: He is alert and oriented to person, place, and time.    Psychiatric:         Mood and Affect: Mood normal.         Behavior: Behavior normal.         Judgment: Judgment normal.         Medications:    Current Facility-Administered Medications:   •  acetaminophen (TYLENOL) tablet 650 mg, 650 mg, Oral, Q4H PRN, Ney Schwartz PA-C  •  aluminum-magnesium hydroxide-simethicone (MAALOX) oral suspension 30 mL, 30 mL, Oral, Q6H PRN, Ney Schwartz PA-C  •  aspirin (ECOTRIN LOW STRENGTH) EC tablet 81 mg, 81 mg, Oral, Daily, Jaquelin Urrutia PA-C, 81 mg at 07/24/23 9404  •  atorvastatin (LIPITOR) tablet 80 mg, 80 mg, Oral, Daily With Sheron Velazquez PA-C, 80 mg at 07/23/23 2042  •  isosorbide mononitrate (IMDUR) 24 hr tablet 60 mg, 60 mg, Oral, Daily, Skeeter Duverney, PA-C, 60 mg at 07/24/23 6765  •  lisinopril (ZESTRIL) tablet 40 mg, 40 mg, Oral, Daily, Jaquelin Urrutia PA-C, 40 mg at 07/24/23 6656  •  morphine injection 2 mg, 2 mg, Intravenous, Q4H PRN, Jaquelin Urrutia PA-C, 2 mg at 07/23/23 2135  •  ondansetron (ZOFRAN) injection 4 mg, 4 mg, Intravenous, Q6H PRN, Jaquelin Urrutia PA-C    Labs & Results:      Results from last 7 days   Lab Units 07/23/23  1019   WBC Thousand/uL 6.81   HEMOGLOBIN g/dL 13.6   HEMATOCRIT % 41.1   PLATELETS Thousands/uL 217         Results from last 7 days   Lab Units 07/23/23  1019   POTASSIUM mmol/L 3.9   CHLORIDE mmol/L 102   CO2 mmol/L 27   BUN mg/dL 17   CREATININE mg/dL 0.84

## 2023-07-24 NOTE — PLAN OF CARE
Problem: PAIN - ADULT  Goal: Verbalizes/displays adequate comfort level or baseline comfort level  Description: Interventions:  - Encourage patient to monitor pain and request assistance  - Assess pain using appropriate pain scale  - Administer analgesics based on type and severity of pain and evaluate response  - Implement non-pharmacological measures as appropriate and evaluate response  - Consider cultural and social influences on pain and pain management  - Notify physician/advanced practitioner if interventions unsuccessful or patient reports new pain  Outcome: Progressing     Problem: DISCHARGE PLANNING  Goal: Discharge to home or other facility with appropriate resources  Description: INTERVENTIONS:  - Identify barriers to discharge w/patient and caregiver  - Arrange for needed discharge resources and transportation as appropriate  - Identify discharge learning needs (meds, wound care, etc.)  - Arrange for interpretive services to assist at discharge as needed  - Refer to Case Management Department for coordinating discharge planning if the patient needs post-hospital services based on physician/advanced practitioner order or complex needs related to functional status, cognitive ability, or social support system  Outcome: Progressing     Problem: Knowledge Deficit  Goal: Patient/family/caregiver demonstrates understanding of disease process, treatment plan, medications, and discharge instructions  Description: Complete learning assessment and assess knowledge base.   Interventions:  - Provide teaching at level of understanding  - Provide teaching via preferred learning methods  Outcome: Progressing

## 2023-07-24 NOTE — ASSESSMENT & PLAN NOTE
· Blood pressures well controlled  · Lopressor was initiated, however it was discontinued in view of the heart rate dipping down  · Once again, case reviewed with cardiology will DC home on Imdur  · Cardiology will be following up in the near future in the outpatient setting

## 2023-07-24 NOTE — ASSESSMENT & PLAN NOTE
· Resolved  · ACS has been ruled out  · Atypical- possibly related to esophageal spasm  · Patient has done much better with Imdur initiation  · Case reviewed with cardiology-cleared by cardiology for discharge  · Imdur has been prescribed  · Outpatient follow-up with cardiology

## 2023-07-24 NOTE — CASE MANAGEMENT
Case Management Discharge Planning Note    Patient name James Sawant  Location [address] [facility] [place] 220/-01 MRN [id number]  : 1975 Date 2023       Current Admission Date: 2023  Current Admission Diagnosis:Chest pain   Patient Active Problem List    Diagnosis Date Noted   • Chest pain 2022   • Thoracic aortic aneurysm ([address]) 2022   • Olecranon bursitis of left elbow 2020   • Essential hypertension 2020   • Mixed hyperlipidemia 2020      LOS (days): 0  Geometric Mean LOS (GMLOS) (days):   Days to GMLOS:     OBJECTIVE:            Current admission status: Observation   Preferred Pharmacy:   [place] [address] [address] [facility], [address] [place] [id number]  Phone: [phone] Fax: [phone]    Primary Care Provider: Kirt Varela DO    Primary Insurance: [facility]  Secondary Insurance:     DISCHARGE DETAILS:  Additional Comments: Chart reviewed for discharge planning. Pt IPTA. Pt has no anticipated CM discharge needs. Pt michael return home with family when stable. CM to follow.

## 2023-07-24 NOTE — ASSESSMENT & PLAN NOTE
Presents with stabbing chest pain last lasts 10 seconds in duration   Intermittent from Wednesday     Radiates into the left arm    Associated with SOB (taking his breath away) and fatigue   [place] at rest    Not aggravated or alleviated by anything    CTA: no dissecting Aneurysm,  Asc Ao Aneurysm stable at 4.3 cm   HS troponin negative  EKG is non-ischemic   Patient on Lisinopril and statin   Did not tolerate BB due to bradycardia  Pain alleviated with Isosorbide    We will arrange OP follow up

## 2023-07-24 NOTE — NURSING NOTE
Patient IV's were removed with tip in tact. RN reviewed discharge AVS with patient and family  And all questions were answered in a timely manner. RN escorted patient out to family car via wheel chair.

## 2023-07-24 NOTE — DISCHARGE INSTR - AVS FIRST PAGE
Dear Tim Hutton,     It was our pleasure to care for you here at [facility]. It is our hope that we were always able to exceed the expected standards for your care during your stay. You were hospitalized due to chest pain. You were cared for on the medical/surgical floor by Neftaly Roman MD with the [facility] who covers for your primary care physician (PCP), Justo Graham DO, while you were hospitalized. You were additionally seen by the [facility]-Dr. Lisa Bennett. If you have any questions or concerns related to this hospitalization, you may contact us at [phone]. For follow up as well as any medication refills, we recommend that you follow up with your primary care physician. A registered nurse will reach out to you by phone within a few days after your discharge to answer any additional questions that you may have after going home. However, at this time we provide for you here, the most important instructions / recommendations at discharge:     Notable Medication Adjustments -   New prescription Imdur 60 mg take 1 tablet daily by mouth  Okay to resume all other preadmission medications at the preadmission doses  Testing Required after Discharge -   To be further determined in the outpatient setting by your primary care provider, and/or by cardiology  Important follow up information -   Please follow-up with the providers as outlined in this discharge packet  Other Instructions -   Please monitor your blood pressure closely  Please continue to follow-up with cardiothoracic surgery for continued surveillance of the aneurysm  Please review this entire after visit summary as additional general instructions including medication list, appointments, activity, diet, any pertinent wound care, and other additional recommendations from your care team that may be provided for you.       Sincerely,     Neftaly Roman, MD

## 2023-07-24 NOTE — ASSESSMENT & PLAN NOTE
· CTA dissection protocol: Stable fusiform ectasia of the ascending thoracic aorta measuring up to 43 mm. Recommendation is for continued surveillance with follow-up low radiation dose chest CT in one year.   · Patient will follow-up with cardiothoracic surgery for continued surveillance, he is well aware of an aneurysm being present

## 2023-07-24 NOTE — UTILIZATION REVIEW
Initial Clinical Review    Admission: Date/Time/Statement:   Admission Orders (From admission, onward)     Ordered        07/23/23 1321  Place in Observation  Once                      Orders Placed This Encounter   Procedures   • Place in Observation     Standing Status:   Standing     Number of Occurrences:   1     Order Specific Question:   Level of Care     Answer:   Med Surg [16]     ED Arrival Information     Expected   -    Arrival   7/23/2023 09:53    Acuity   Emergent            Means of arrival   Walk-In    Escorted by   Spouse    Service   Hospitalist    Admission type   Emergency            Arrival complaint   chest pain           Chief Complaint   Patient presents with   • Chest Pain     Center chest         Initial Presentation: 49 y.o. male to the ED from home with complaints of chest pain. H/o thoracic aortic aneurysm, htn, HLD. Admitted under observation for chest pain. EKG without ischemic changes. Continue lisinopril, statin. Cardiology consult. Troponin neg. CT chest neg for dissection. GIven IV dilaudid for pain in the ED. Cardiology consult: Continue with Lisinopril, statin. Add BB.    Date:     Day 2:      ED Triage Vitals   Temperature Pulse Respirations Blood Pressure SpO2   07/23/23 1010 07/23/23 1010 07/23/23 1017 07/23/23 1017 07/23/23 1010   98.3 °F (36.8 °C) 60 17 136/79 94 %      Temp Source Heart Rate Source Patient Position - Orthostatic VS BP Location FiO2 (%)   07/23/23 1010 -- 07/23/23 1514 07/23/23 1514 --   Tympanic  Lying Left arm       Pain Score       07/23/23 1010       5          Wt Readings from Last 1 Encounters:   07/23/23 133 kg (293 lb 12.2 oz)     Additional Vital Signs:   Date/Time Temp Pulse Resp BP MAP (mmHg) SpO2 O2 Device Patient Position - Orthostatic VS   07/24/23 07:35:34 98.1 °F (36.7 °C) 75 18 121/68 86 97 % -- --   07/24/23 02:57:34 97.8 °F (36.6 °C) 54 Abnormal  20 118/64 82 98 % -- --   07/23/23 22:47:01 98 °F (36.7 °C) 57 20 108/64 79 97 % -- -- 07/23/23 20:41:12 -- 47 Abnormal  -- 114/69 84 97 % -- --   07/23/23 19:02:02 97.8 °F (36.6 °C) 50 Abnormal  20 93/63 73 97 % -- --   07/23/23 1519 -- -- -- -- -- -- None (Room air) --   07/23/23 15:14:42 97.8 °F (36.6 °C) 49 Abnormal  17 126/80 95 96 % None (Room air) Lying   07/23/23 14:07:19 97.6 °F (36.4 °C) 46 Abnormal  -- 140/85 103 97 % -- --   07/23/23 1315 -- 50 Abnormal  -- 129/76 100 95 % -- --   07/23/23 1245 -- 53 Abnormal  16 144/81 105 94 % -- --   07/23/23 1230 -- 55 21 154/90 115 98 % -- --   07/23/23 1215 -- 54 Abnormal  17 154/89 115 97 % -- --   07/23/23 1130 -- 51 Abnormal  17 150/70 101 96 % -- --   07/23/23 1017 -- -- 17 136/79 -- -- -- --   07/23/23 1010 98.3 °F (36.8 °C) 60 -- -- -- 94 % None (Room air) --       Pertinent Labs/Diagnostic Test Results:  7/23 EKG:  Normal sinus rhythm  Left axis deviation  Otherwise normal ECG  When compared with ECG of 08-JUN-2022 06:59,  No significant change was found  CTA dissection protocol chest/abdomen/pelvis   Final Result by Jerson Mejia MD (07/23 1218)      No aortic dissection or intramural hematoma. Stable fusiform ectasia of the ascending thoracic aorta measuring up to 43 mm. Recommendation is for continued surveillance with follow-up low radiation dose chest CT in one year. No lymphadenopathy or evidence of metastatic disease in the chest, abdomen, and pelvis. I personally discussed this study with PIERRE MARTÍNEZ on 7/23/2023 12:09 PM.            Workstation performed: [id number]         XR chest 1 view portable   Final Result by Regulo Boone MD (07/23 1918)      No acute cardiopulmonary disease.                   Workstation performed: [id number]           Results from last 7 days   Lab Units 07/23/23  1104   SARS-COV-2  Negative     Results from last 7 days   Lab Units 07/23/23  1019   WBC Thousand/uL 6.81   HEMOGLOBIN g/dL 13.6   HEMATOCRIT % 41.1   PLATELETS Thousands/uL 217   NEUTROS ABS Thousands/µL 4.93 Results from last 7 days   Lab Units 07/23/23  1019   SODIUM mmol/L 138   POTASSIUM mmol/L 3.9   CHLORIDE mmol/L 102   CO2 mmol/L 27   ANION GAP mmol/L 9   BUN mg/dL 17   CREATININE mg/dL 0.84   EGFR ml/min/1.73sq m 103   CALCIUM mg/dL 9.4     Results from last 7 days   Lab Units 07/23/23  1019   AST U/L 17   ALT U/L 21   ALK PHOS U/L 47   TOTAL PROTEIN g/dL 7.4   ALBUMIN g/dL 4.6   TOTAL BILIRUBIN mg/dL 0.82         Results from last 7 days   Lab Units 07/23/23  1019   GLUCOSE RANDOM mg/dL 83       Results from last 7 days   Lab Units 07/23/23  1222 07/23/23  1019   HS TNI 0HR ng/L  --  2   HS TNI 2HR ng/L 2  --    HSTNI D2 ng/L 0  --              Results from last 7 days   Lab Units 07/23/23  1104   INFLUENZA A PCR  Negative   INFLUENZA B PCR  Negative   RSV PCR  Negative           ED Treatment:   Medication Administration from 07/23/2023 1796 to 07/23/2023 1403       Date/Time Order Dose Route Action Comments     07/23/2023 1104 EDT HYDROmorphone (DILAUDID) injection 0.5 mg 0.5 mg Intravenous Given --     07/23/2023 1159 EDT HYDROmorphone (DILAUDID) injection 1 mg 1 mg Intravenous Given --        Past Medical History:   Diagnosis Date   • Ascending aortic aneurysm ([address])    • Asthma    • Hyperlipidemia    • Hypertension    • Testicular cancer [facility])        Admitting Diagnosis: Chest pain [R07.9]  Chest pain, unspecified type [R07.9]  Age/Sex: 49 y.o. male  Admission Orders:  Scheduled Medications:  aspirin, 81 mg, Oral, Daily  atorvastatin, 80 mg, Oral, Daily With Dinner  isosorbide mononitrate, 30 mg, Oral, Daily  lisinopril, 40 mg, Oral, Daily  metoprolol tartrate, 25 mg, Oral, Q12H [address]      Continuous IV Infusions:     PRN Meds:  acetaminophen, 650 mg, Oral, Q4H PRN  aluminum-magnesium hydroxide-simethicone, 30 mL, Oral, Q6H PRN  morphine injection, 2 mg, Intravenous, Q4H PRN  ondansetron, 4 mg, Intravenous, Q6H PRN        IP CONSULT TO CARDIOLOGY    Network Utilization Review Department  ATTENTION: Please call with any questions or concerns to [phone] and carefully listen to the prompts so that you are directed to the right person. All voicemails are confidential.  Dennis Mejia all requests for admission clinical reviews, approved or denied determinations and any other requests to dedicated fax number below belonging to the campus where the patient is receiving treatment.  List of dedicated fax numbers for the Facilities:  [place] DENIALS (Administrative/Medical Necessity) [phone] [address] [facility] (Maternity/NICU/Pediatrics) [phone]   [address] [place] [address] [phone]   [facility] [address] [facility] [phone]   [address] [facility] [address] [facility] [address] [place] [address] [phone]   [address] [address] [phone]

## 2023-07-24 NOTE — DISCHARGE SUMMARY
[address]  Discharge- Lexy Alvarez 1975, 49 y.o. male MRN: [id number]  Unit/Bed#: -[facility] Encounter: [id number]  Primary Care Provider: Rebecca Lo DO   Date and time admitted to hospital: 7/23/2023 10:04 AM    * Chest pain  Assessment & Plan  · Resolved  · ACS has been ruled out  · Atypical- possibly related to esophageal spasm  · Patient has done much better with Imdur initiation  · Case reviewed with cardiology-cleared by cardiology for discharge  · Imdur has been prescribed  · Outpatient follow-up with cardiology    Essential hypertension  Assessment & Plan  · Blood pressures well controlled  · Lopressor was initiated, however it was discontinued in view of the heart rate dipping down  · Once again, case reviewed with cardiology will DC home on Imdur  · Cardiology will be following up in the near future in the outpatient setting    Mixed hyperlipidemia  Assessment & Plan  · Continue statin therapy postdischarge    Thoracic aortic aneurysm ([address])  Assessment & Plan  · CTA dissection protocol: Stable fusiform ectasia of the ascending thoracic aorta measuring up to 43 mm. Recommendation is for continued surveillance with follow-up low radiation dose chest CT in one year. · Patient will follow-up with cardiothoracic surgery for continued surveillance, he is well aware of an aneurysm being present        Discharging Physician / Practitioner: Jewels Atwood MD  PCP: Rebecca Lo DO  Admission Date:   Admission Orders (From admission, onward)     Ordered        07/23/23 1321  Place in Observation  Once                      Discharge Date: 07/24/23    Medical Problems     Resolved Problems  Date Reviewed: 7/24/2023   None         Consultations During Hospital Stay:  · Cardiology    Procedures Performed:   · None    Significant Findings / Test Results:   · CTA dissection protocol chest/abdomen/pelvis-  No aortic dissection or intramural hematoma.  Stable fusiform ectasia of the ascending thoracic aorta measuring up to 43 mm. Recommendation is for continued surveillance with follow-up low radiation dose chest CT in one year. No lymphadenopathy or evidence of metastatic disease in the chest, abdomen, and pelvis. · X-ray chest-no acute cardiopulmonary disease    Incidental Findings:   · None    Test Results Pending at Discharge (will require follow up): · None     Outpatient Tests Requested:  · None    Complications:    • None    Reason for Admission: Chest pain rule out ACS    Hospital Course:     Tim Hutton is a 49 y.o. male patient who originally presented to the hospital on 7/23/2023 due to chest pain. Please refer to the initial history and physical examination completed by Castro Gore PA-C for the initial presenting features and complaints. In brief, the patient is a 54-year-old male, who was admitted to the hospital after he came into the emergency room with chest pain. He was placed on MedSurg observation telemetry. He ultimately ruled out for the possibility of an acute coronary event with negative troponin testing. He was seen by cardiology, initially Lopressor was initiated, however this was discontinued in view of the heart rate dipping down a little. He was started on Imdur. Ultimately it was felt that the possibility of underlying esophageal spasms might be the cause of his chest pain. Imdur was initiated and the patient felt much better. Patient was cleared by cardiology for discharge on 7/24/2023, he will be following up in the near future in the outpatient setting with cardiology, and his PCP for continued medical management. Please refer to the assessment/plan portion of this discharge summary as outlined above for additional details regarding his stay. Please see above list of diagnoses and related plan for additional information.      Condition at Discharge: good     Discharge Day Visit / Exam:     Subjective: Patient seen, looks and feels well, is ready to go home  Vitals: Blood Pressure: 121/68 (07/24/23 0735)  Pulse: 75 (07/24/23 0735)  Temperature: 98.1 °F (36.7 °C) (07/24/23 0735)  Temp Source: Oral (07/23/23 1514)  Respirations: 18 (07/24/23 0735)  Height: 6' 9" (205.7 cm) (07/23/23 1407)  Weight - Scale: 133 kg (293 lb 12.2 oz) (07/23/23 1407)  SpO2: 97 % (07/24/23 0735)  Exam:   Physical Exam  Vitals and nursing note reviewed. Constitutional:       General: He is not in acute distress. Appearance: Normal appearance. He is not ill-appearing. HENT:      Head: Normocephalic and atraumatic. Nose: Nose normal.   Eyes:      Extraocular Movements: Extraocular movements intact. Pupils: Pupils are equal, round, and reactive to light. Cardiovascular:      Rate and Rhythm: Normal rate and regular rhythm. Pulses: Normal pulses. Heart sounds: Normal heart sounds. No murmur heard. No friction rub. No gallop. Pulmonary:      Effort: Pulmonary effort is normal.      Breath sounds: Normal breath sounds. Abdominal:      General: There is no distension. Palpations: Abdomen is soft. There is no mass. Tenderness: There is no abdominal tenderness. There is no guarding or rebound. Musculoskeletal:         General: No swelling or tenderness. Normal range of motion. Cervical back: Normal range of motion and neck supple. No rigidity. No muscular tenderness. Right lower leg: No edema. Left lower leg: No edema. Skin:     General: Skin is warm. Capillary Refill: Capillary refill takes less than 2 seconds. Findings: No erythema or rash. Neurological:      General: No focal deficit present. Mental Status: He is alert and oriented to person, place, and time. Mental status is at baseline.    Psychiatric:         Mood and Affect: Mood normal.         Behavior: Behavior normal.         Discussion with Family: No family members were present at the time of my discharge evaluation, nor did the patient ask me to call anyone    Discharge instructions/Information to patient and family:   See after visit summary for information provided to patient and family. Provisions for Follow-Up Care:  See after visit summary for information related to follow-up care and any pertinent home health orders. Disposition:     Home      Planned Readmission:   • None     Discharge Statement:  I spent 35 minutes discharging the patient. This time was spent on the day of discharge. I had direct contact with the patient on the day of discharge. Greater than 50% of the total time was spent examining patient, answering all patient questions, arranging and discussing plan of care with patient as well as directly providing post-discharge instructions. Additional time then spent on discharge activities. Discharge Medications:  See after visit summary for reconciled discharge medications provided to patient and family.       ** Please Note: This note has been constructed using a voice recognition system **

## 2023-07-24 NOTE — ASSESSMENT & PLAN NOTE
Moderately controlled in the setting of chest pain   Continue pre-hospital medications   On lisinopril 40 mg daily

## 2023-07-31 ENCOUNTER — HOSPITAL ENCOUNTER (OUTPATIENT)
Dept: NON INVASIVE DIAGNOSTICS | Facility: CLINIC | Age: 48
Discharge: HOME | End: 2023-07-31
Payer: COMMERCIAL

## 2023-07-31 VITALS — WEIGHT: 293 LBS | HEIGHT: 78 IN | BODY MASS INDEX: 33.9 KG/M2

## 2023-07-31 DIAGNOSIS — R07.9 CHEST PAIN, UNSPECIFIED TYPE: ICD-10-CM

## 2023-07-31 LAB
CHEST PAIN STATEMENT: NORMAL
MAX DIASTOLIC BP: 66 MMHG
MAX HEART RATE: 142 BPM
MAX HR PERCENT: 82 %
MAX HR: 142 BPM
MAX PREDICTED HEART RATE: 172 BPM
MAX. SYSTOLIC BP: 158 MMHG
PROTOCOL NAME: NORMAL
RATE PRESSURE PRODUCT: NORMAL
REASON FOR TERMINATION: NORMAL
SL CV STRESS RECOVERY BP: NORMAL MMHG
SL CV STRESS RECOVERY HR: 85 BPM
SL CV STRESS RECOVERY O2 SAT: 98 %
SL CV STRESS STAGE REACHED: 3
STRESS ANGINA INDEX: 0
STRESS BASELINE BP: NORMAL MMHG
STRESS BASELINE HR: 75 BPM
STRESS O2 SAT REST: 97 %
STRESS PEAK HR: 142 BPM
STRESS POST ESTIMATED WORKLOAD: 8 METS
STRESS POST EXERCISE DUR MIN: 6 MIN
STRESS POST EXERCISE DUR SEC: 40 SEC
STRESS POST O2 SAT PEAK: 96 %
STRESS POST PEAK BP: 158 MMHG
TARGET HR FORMULA: NORMAL
TIME IN EXERCISE PHASE: NORMAL

## 2023-07-31 PROCEDURE — 93016 CV STRESS TEST SUPVJ ONLY: CPT | Performed by: INTERNAL MEDICINE

## 2023-07-31 PROCEDURE — 93018 CV STRESS TEST I&R ONLY: CPT | Performed by: INTERNAL MEDICINE

## 2023-07-31 PROCEDURE — 93017 CV STRESS TEST TRACING ONLY: CPT

## 2023-08-08 ENCOUNTER — OFFICE VISIT (OUTPATIENT)
Dept: CARDIOLOGY CLINIC | Facility: CLINIC | Age: 48
End: 2023-08-08
Payer: COMMERCIAL

## 2023-08-08 VITALS
DIASTOLIC BLOOD PRESSURE: 88 MMHG | HEART RATE: 64 BPM | SYSTOLIC BLOOD PRESSURE: 132 MMHG | OXYGEN SATURATION: 99 % | RESPIRATION RATE: 16 BRPM | HEIGHT: 78 IN | WEIGHT: 291 LBS | BODY MASS INDEX: 33.67 KG/M2

## 2023-08-08 DIAGNOSIS — I71.21 ANEURYSM OF ASCENDING AORTA WITHOUT RUPTURE (HCC): ICD-10-CM

## 2023-08-08 DIAGNOSIS — I10 ESSENTIAL HYPERTENSION: ICD-10-CM

## 2023-08-08 DIAGNOSIS — R07.9 CHEST PAIN, UNSPECIFIED TYPE: Primary | ICD-10-CM

## 2023-08-08 PROCEDURE — 99214 OFFICE O/P EST MOD 30 MIN: CPT | Performed by: NURSE PRACTITIONER

## 2023-08-08 NOTE — PROGRESS NOTES
Patient ID: Jake Desai is a 49 y.o. male. Plan:      Essential hypertension  Blood pressure well controlled  Continue current medications    Thoracic aortic aneurysm (HCC)  Stable  Follows with CT surgery    Chest pain  No recurrence since discharge from the hospital       Follow up Plan/Summary Comments: The symptoms that Lindsay Tang experienced last year compared to his recent symptoms, are different. He underwent stress test reaching 82% of his maximum predicted heart rate, which is just under his target. He did not experience any symptoms and has not experienced any symptoms at home. Given the lack of recurrent symptoms, will hold off on further testing at this time. Lindsay Tang is scheduled to follow-up with CT surgery for routine surveillance of his aneurysm. He also follows routinely with heart care group and will continue his routine cardiology follow-up in their office. HPI: I had the pleasure of seeing Lindsay Tang in the office today for a hospital follow-up visit. I previously evaluated Quin Fabry in the office in August 2022 with a report of atypical chest pain. At that time, regular stress test was ordered, however never completed. He was evaluated in the emergency room at [address] [place] [address] 7/23/2023 with a report of chest pain. He has a known thoracic aortic aneurysm and from what I gather, there was concern for dissection. Fortunately, after review by CT surgery, it was noted to just be artifact. Lindsay Tang was evaluated by the cardiology team for further evaluation of his chest discomfort. Troponin levels were unremarkable and EKG was without changes. He was discharged home for outpatient follow-up. He called our office regarding an echocardiogram and decided to schedule his stress test that was ordered last August.  He underwent this test on 7/31/2023. His test was read as normal at submaximal stress. Since his discharge from the hospital, Lindsay Tang has been feeling well.   He has been active without experiencing recurrent episodes of chest discomfort. He denies any shortness of breath, palpitations, dizziness, lightheadedness, syncope. Review of Systems   10  point ROS  was otherwise non pertinent or negative except as per HPI or as below. Gait: Normal      Most recent or relevant cardiac/vascular testing:    Echo 5/30/2023  EF 55%  Aortic root is mildly dilated to 3.8 cm, ascending aorta is dilated to 4.3 cm  Mild AI, MR    Exercise stress test 7/31/2023  EKG was negative for ischemia after submaximal exercise without reproduction of symptoms. The patient had a maximal heart rate of 142 bpm (82% of MPHR) and 8 METS. Objective:     /88 (BP Location: Left arm, Patient Position: Sitting, Cuff Size: Large)   Pulse 64   Resp 16   Ht 6' 9" (2.057 m)   Wt 132 kg (291 lb)   SpO2 99%   BMI 31.18 kg/m²     PHYSICAL EXAM:    General:  Normal appearance, no acute distress  Eyes:  Anicteric. Oral mucosa:  Moist.  Neck:  No JVD. Carotid upstrokes are brisk without bruits. No masses. Chest:  Clear to auscultation   Cardiac:  No palpable PMI. Normal S1 and S2.  Murmur noted. Abdomen:  Soft and nontender. No palpable organomegaly or aortic enlargement. Extremities:  No peripheral edema. Musculoskeletal:  Symmetric. Vascular:  Pedal pulses are intact. Neuro:  Grossly symmetric. Psych:  Alert and oriented x3.     Allergies   Allergen Reactions   • Ibuprofen Other (See Comments) and GI Bleeding     Other reaction(s): severe chest pain  800mg  800mg  Other reaction(s): Free Text  800mg   • Cat Hair Extract Wheezing   • Grass Extracts [Gramineae Pollens] Headache     Eyes water   • Lidocaine Hives     rash   • Pollen Extract Sneezing   • Percocet [Oxycodone-Acetaminophen] Vomiting   • Bee Pollen Rash   • Dust Mite Extract Sneezing   • Mold Extract [Trichophyton] Sneezing       Current Outpatient Medications:   •  ergocalciferol (VITAMIN D2) 50,000 units, Take 50,000 Units by mouth every 14 (fourteen) days, Disp: , Rfl:   •  ibuprofen (MOTRIN) 200 mg tablet, Take 200 mg by mouth every 6 (six) hours as needed for mild pain, Disp: , Rfl:   •  isosorbide mononitrate (IMDUR) 60 mg 24 hr tablet, Take 1 tablet (60 mg total) by mouth daily, Disp: 30 tablet, Rfl: 0  •  lisinopril (ZESTRIL) 40 mg tablet, Take 40 mg by mouth daily, Disp: , Rfl:   •  rosuvastatin (CRESTOR) 40 MG tablet, Take 40 mg by mouth daily, Disp: , Rfl:   Past Medical History:   Diagnosis Date   • Ascending aortic aneurysm (HCC)    • Asthma    • Hyperlipidemia    • Hypertension    • Testicular cancer [facility])      Past Surgical History:   Procedure Laterality Date   • HERNIA REPAIR     • LYMPHADENECTOMY     • ORCHIECTOMY      and LN dissection   • ROTATOR CUFF REPAIR         CMP:   Lab Results   Component Value Date    K 3.9 2023     2023    CO2 27 2023    BUN 17 2023    CREATININE 0.84 2023    EGFR 103 2023     Lipid Profile:  No results found for: "CHOL", "TRIG", "HDL", "LDL"      Social History     Tobacco Use   Smoking Status Former   • Packs/day: 0.50   • Years: 1.00   • Total pack years: 0.50   • Types: Cigarettes   • Start date: 1992   • Quit date: 10/5/1993   • Years since quittin.8   Smokeless Tobacco Never

## 2023-08-09 LAB
CHEST PAIN STATEMENT: NORMAL
MAX DIASTOLIC BP: 66 MMHG
MAX HEART RATE: 142 BPM
MAX PREDICTED HEART RATE: 172 BPM
MAX. SYSTOLIC BP: 158 MMHG
PROTOCOL NAME: NORMAL
REASON FOR TERMINATION: NORMAL
TARGET HR FORMULA: NORMAL
TIME IN EXERCISE PHASE: NORMAL

## 2024-05-03 ENCOUNTER — TRANSCRIBE ORDERS (OUTPATIENT)
Dept: CARDIAC SURGERY | Facility: CLINIC | Age: 49
End: 2024-05-03

## 2024-05-03 DIAGNOSIS — I71.21 ANEURYSM OF ASCENDING AORTA WITHOUT RUPTURE (HCC): Primary | ICD-10-CM

## 2024-06-20 ENCOUNTER — OCCMED (OUTPATIENT)
Dept: URGENT CARE | Facility: CLINIC | Age: 49
End: 2024-06-20

## 2024-06-20 DIAGNOSIS — Z02.89 ENCOUNTER FOR PHYSICAL EXAMINATION RELATED TO EMPLOYMENT: Primary | ICD-10-CM

## 2024-07-08 ENCOUNTER — TELEPHONE (OUTPATIENT)
Age: 49
End: 2024-07-08

## 2024-07-08 NOTE — TELEPHONE ENCOUNTER
Patient calling stating he received a letter to schedule one year follow up.  Patient needs testing.  Transferred to Central Scheduling.

## 2024-07-11 ENCOUNTER — HOSPITAL ENCOUNTER (OUTPATIENT)
Dept: CT IMAGING | Facility: HOSPITAL | Age: 49
End: 2024-07-11
Payer: COMMERCIAL

## 2024-07-11 DIAGNOSIS — I71.21 ANEURYSM OF ASCENDING AORTA WITHOUT RUPTURE (HCC): ICD-10-CM

## 2024-07-11 PROCEDURE — 71250 CT THORAX DX C-: CPT

## 2024-08-05 ENCOUNTER — OFFICE VISIT (OUTPATIENT)
Dept: CARDIAC SURGERY | Facility: CLINIC | Age: 49
End: 2024-08-05
Payer: COMMERCIAL

## 2024-08-05 VITALS
DIASTOLIC BLOOD PRESSURE: 83 MMHG | HEART RATE: 74 BPM | OXYGEN SATURATION: 97 % | WEIGHT: 310.4 LBS | HEIGHT: 78 IN | SYSTOLIC BLOOD PRESSURE: 125 MMHG | BODY MASS INDEX: 35.91 KG/M2

## 2024-08-05 DIAGNOSIS — I71.21 ANEURYSM OF ASCENDING AORTA WITHOUT RUPTURE (HCC): Primary | ICD-10-CM

## 2024-08-05 PROCEDURE — 99214 OFFICE O/P EST MOD 30 MIN: CPT | Performed by: THORACIC SURGERY (CARDIOTHORACIC VASCULAR SURGERY)
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
## 2024-08-09 ENCOUNTER — APPOINTMENT (EMERGENCY)
Dept: RADIOLOGY | Facility: HOSPITAL | Age: 49
End: 2024-08-09
Payer: COMMERCIAL

## 2024-08-09 ENCOUNTER — HOSPITAL ENCOUNTER (EMERGENCY)
Facility: HOSPITAL | Age: 49
Discharge: HOME | End: 2024-08-09
Attending: INTERNAL MEDICINE
Payer: COMMERCIAL

## 2024-08-09 VITALS
OXYGEN SATURATION: 98 % | DIASTOLIC BLOOD PRESSURE: 76 MMHG | HEART RATE: 64 BPM | RESPIRATION RATE: 18 BRPM | SYSTOLIC BLOOD PRESSURE: 136 MMHG | TEMPERATURE: 97.4 F

## 2024-08-09 DIAGNOSIS — R07.89 ATYPICAL CHEST PAIN: Primary | ICD-10-CM

## 2024-08-09 LAB
ALBUMIN SERPL BCG-MCNC: 4.4 G/DL (ref 3.5–5)
ALP SERPL-CCNC: 48 U/L (ref 34–104)
ALT SERPL W P-5'-P-CCNC: 25 U/L (ref 7–52)
ANION GAP SERPL CALCULATED.3IONS-SCNC: 8 MMOL/L (ref 4–13)
AST SERPL W P-5'-P-CCNC: 18 U/L (ref 13–39)
BASOPHILS # BLD AUTO: 0.02 THOUSANDS/ÂΜL (ref 0–0.1)
BASOPHILS NFR BLD AUTO: 0 % (ref 0–1)
BILIRUB SERPL-MCNC: 0.51 MG/DL (ref 0.2–1)
BUN SERPL-MCNC: 22 MG/DL (ref 5–25)
CALCIUM SERPL-MCNC: 9.1 MG/DL (ref 8.4–10.2)
CARDIAC TROPONIN I PNL SERPL HS: <2 NG/L
CHLORIDE SERPL-SCNC: 103 MMOL/L (ref 96–108)
CO2 SERPL-SCNC: 25 MMOL/L (ref 21–32)
CREAT SERPL-MCNC: 0.83 MG/DL (ref 0.6–1.3)
D DIMER PPP FEU-MCNC: <0.27 UG/ML FEU
EOSINOPHIL # BLD AUTO: 0.15 THOUSAND/ÂΜL (ref 0–0.61)
EOSINOPHIL NFR BLD AUTO: 1 % (ref 0–6)
ERYTHROCYTE [DISTWIDTH] IN BLOOD BY AUTOMATED COUNT: 11.9 % (ref 11.6–15.1)
GFR SERPL CREATININE-BSD FRML MDRD: 103 ML/MIN/1.73SQ M
GLUCOSE SERPL-MCNC: 115 MG/DL (ref 65–140)
HCT VFR BLD AUTO: 39.5 % (ref 36.5–49.3)
HGB BLD-MCNC: 13.5 G/DL (ref 12–17)
IMM GRANULOCYTES # BLD AUTO: 0.04 THOUSAND/UL (ref 0–0.2)
IMM GRANULOCYTES NFR BLD AUTO: 0 % (ref 0–2)
LYMPHOCYTES # BLD AUTO: 1.92 THOUSANDS/ÂΜL (ref 0.6–4.47)
LYMPHOCYTES NFR BLD AUTO: 17 % (ref 14–44)
MCH RBC QN AUTO: 29.7 PG (ref 26.8–34.3)
MCHC RBC AUTO-ENTMCNC: 34.2 G/DL (ref 31.4–37.4)
MCV RBC AUTO: 87 FL (ref 82–98)
MONOCYTES # BLD AUTO: 0.83 THOUSAND/ÂΜL (ref 0.17–1.22)
MONOCYTES NFR BLD AUTO: 8 % (ref 4–12)
NEUTROPHILS # BLD AUTO: 8.06 THOUSANDS/ÂΜL (ref 1.85–7.62)
NEUTS SEG NFR BLD AUTO: 74 % (ref 43–75)
NRBC BLD AUTO-RTO: 0 /100 WBCS
PLATELET # BLD AUTO: 232 THOUSANDS/UL (ref 149–390)
PMV BLD AUTO: 10 FL (ref 8.9–12.7)
POTASSIUM SERPL-SCNC: 3.8 MMOL/L (ref 3.5–5.3)
PROT SERPL-MCNC: 7.2 G/DL (ref 6.4–8.4)
RBC # BLD AUTO: 4.54 MILLION/UL (ref 3.88–5.62)
SODIUM SERPL-SCNC: 136 MMOL/L (ref 135–147)
WBC # BLD AUTO: 11.02 THOUSAND/UL (ref 4.31–10.16)

## 2024-08-09 PROCEDURE — 85379 FIBRIN DEGRADATION QUANT: CPT | Performed by: INTERNAL MEDICINE

## 2024-08-09 PROCEDURE — 36415 COLL VENOUS BLD VENIPUNCTURE: CPT | Performed by: INTERNAL MEDICINE

## 2024-08-09 PROCEDURE — 71045 X-RAY EXAM CHEST 1 VIEW: CPT

## 2024-08-09 PROCEDURE — 99285 EMERGENCY DEPT VISIT HI MDM: CPT

## 2024-08-09 PROCEDURE — 84484 ASSAY OF TROPONIN QUANT: CPT | Performed by: INTERNAL MEDICINE

## 2024-08-09 PROCEDURE — 99284 EMERGENCY DEPT VISIT MOD MDM: CPT | Performed by: INTERNAL MEDICINE

## 2024-08-09 PROCEDURE — 96374 THER/PROPH/DIAG INJ IV PUSH: CPT

## 2024-08-09 PROCEDURE — 93005 ELECTROCARDIOGRAM TRACING: CPT

## 2024-08-09 PROCEDURE — 80053 COMPREHEN METABOLIC PANEL: CPT | Performed by: INTERNAL MEDICINE

## 2024-08-09 PROCEDURE — 85025 COMPLETE CBC W/AUTO DIFF WBC: CPT | Performed by: INTERNAL MEDICINE

## 2024-08-09 RX ORDER — ACETAMINOPHEN 325 MG/1
650 TABLET ORAL ONCE
Status: COMPLETED | OUTPATIENT
Start: 2024-08-09 | End: 2024-08-09

## 2024-08-09 RX ORDER — ALBUTEROL SULFATE 5 MG/ML
2.5 SOLUTION RESPIRATORY (INHALATION) ONCE
Status: COMPLETED | OUTPATIENT
Start: 2024-08-09 | End: 2024-08-09

## 2024-08-09 RX ORDER — METHYLPREDNISOLONE SODIUM SUCCINATE 125 MG/2ML
125 INJECTION, POWDER, LYOPHILIZED, FOR SOLUTION INTRAMUSCULAR; INTRAVENOUS ONCE
Status: COMPLETED | OUTPATIENT
Start: 2024-08-09 | End: 2024-08-09

## 2024-08-09 RX ORDER — ALBUTEROL SULFATE 90 UG/1
2 AEROSOL, METERED RESPIRATORY (INHALATION) ONCE
Status: DISCONTINUED | OUTPATIENT
Start: 2024-08-09 | End: 2024-08-09 | Stop reason: HOSPADM

## 2024-08-09 RX ORDER — SODIUM CHLORIDE 9 MG/ML
3 INJECTION INTRAVENOUS
Status: DISCONTINUED | OUTPATIENT
Start: 2024-08-09 | End: 2024-08-09 | Stop reason: HOSPADM

## 2024-08-09 RX ORDER — METHYLPREDNISOLONE 4 MG
TABLET, DOSE PACK ORAL
Qty: 21 TABLET | Refills: 0 | Status: SHIPPED | OUTPATIENT
Start: 2024-08-09

## 2024-08-09 RX ADMIN — ACETAMINOPHEN 650 MG: 325 TABLET ORAL at 17:51

## 2024-08-09 RX ADMIN — ALBUTEROL SULFATE 2.5 MG: 2.5 SOLUTION RESPIRATORY (INHALATION) at 17:52

## 2024-08-09 RX ADMIN — METHYLPREDNISOLONE SODIUM SUCCINATE 125 MG: 125 INJECTION, POWDER, FOR SOLUTION INTRAMUSCULAR; INTRAVENOUS at 17:52
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
## 2024-08-10 LAB
ATRIAL RATE: 81 BPM
P AXIS: 50 DEGREES
PR INTERVAL: 192 MS
QRS AXIS: -42 DEGREES
QRSD INTERVAL: 104 MS
QT INTERVAL: 380 MS
QTC INTERVAL: 441 MS
T WAVE AXIS: 20 DEGREES
VENTRICULAR RATE: 81 BPM

## 2024-08-10 PROCEDURE — 93010 ELECTROCARDIOGRAM REPORT: CPT | Performed by: INTERNAL MEDICINE

## 2025-06-26 ENCOUNTER — OCCMED (OUTPATIENT)
Dept: URGENT CARE | Facility: CLINIC | Age: 50
End: 2025-06-26

## 2025-06-26 DIAGNOSIS — Z02.89 ENCOUNTER FOR PHYSICAL EXAMINATION RELATED TO EMPLOYMENT: Primary | ICD-10-CM

## 2025-07-16 ENCOUNTER — HOSPITAL ENCOUNTER (INPATIENT)
Facility: HOSPITAL | Age: 50
LOS: 3 days | Discharge: HOME | DRG: 392 | End: 2025-07-19
Attending: INTERNAL MEDICINE | Admitting: SURGERY
Payer: COMMERCIAL

## 2025-07-16 ENCOUNTER — APPOINTMENT (EMERGENCY)
Dept: CT IMAGING | Facility: HOSPITAL | Age: 50
DRG: 392 | End: 2025-07-16
Payer: COMMERCIAL

## 2025-07-16 DIAGNOSIS — K57.20 DIVERTICULITIS OF LARGE INTESTINE WITH PERFORATION WITHOUT BLEEDING: Primary | ICD-10-CM

## 2025-07-16 LAB
ALBUMIN SERPL BCG-MCNC: 4.2 G/DL (ref 3.5–5)
ALP SERPL-CCNC: 46 U/L (ref 34–104)
ALT SERPL W P-5'-P-CCNC: 21 U/L (ref 7–52)
ANION GAP SERPL CALCULATED.3IONS-SCNC: 7 MMOL/L (ref 4–13)
AST SERPL W P-5'-P-CCNC: 16 U/L (ref 13–39)
BASOPHILS # BLD AUTO: 0.03 THOUSANDS/ÂΜL (ref 0–0.1)
BASOPHILS NFR BLD AUTO: 0 % (ref 0–1)
BILIRUB SERPL-MCNC: 0.71 MG/DL (ref 0.2–1)
BUN SERPL-MCNC: 17 MG/DL (ref 5–25)
CALCIUM SERPL-MCNC: 9.2 MG/DL (ref 8.4–10.2)
CARDIAC TROPONIN I PNL SERPL HS: <2 NG/L (ref ?–50)
CARDIAC TROPONIN I PNL SERPL HS: <2 NG/L (ref ?–50)
CHLORIDE SERPL-SCNC: 98 MMOL/L (ref 96–108)
CO2 SERPL-SCNC: 29 MMOL/L (ref 21–32)
CREAT SERPL-MCNC: 0.97 MG/DL (ref 0.6–1.3)
EOSINOPHIL # BLD AUTO: 0.21 THOUSAND/ÂΜL (ref 0–0.61)
EOSINOPHIL NFR BLD AUTO: 2 % (ref 0–6)
ERYTHROCYTE [DISTWIDTH] IN BLOOD BY AUTOMATED COUNT: 12.1 % (ref 11.6–15.1)
GFR SERPL CREATININE-BSD FRML MDRD: 90 ML/MIN/1.73SQ M
GLUCOSE SERPL-MCNC: 99 MG/DL (ref 65–140)
HCT VFR BLD AUTO: 36.9 % (ref 36.5–49.3)
HGB BLD-MCNC: 12 G/DL (ref 12–17)
IMM GRANULOCYTES # BLD AUTO: 0.05 THOUSAND/UL (ref 0–0.2)
IMM GRANULOCYTES NFR BLD AUTO: 0 % (ref 0–2)
LACTATE SERPL-SCNC: 0.7 MMOL/L (ref 0.5–2)
LIPASE SERPL-CCNC: 25 U/L (ref 11–82)
LYMPHOCYTES # BLD AUTO: 1.76 THOUSANDS/ÂΜL (ref 0.6–4.47)
LYMPHOCYTES NFR BLD AUTO: 13 % (ref 14–44)
MCH RBC QN AUTO: 28.7 PG (ref 26.8–34.3)
MCHC RBC AUTO-ENTMCNC: 32.5 G/DL (ref 31.4–37.4)
MCV RBC AUTO: 88 FL (ref 82–98)
MONOCYTES # BLD AUTO: 1.17 THOUSAND/ÂΜL (ref 0.17–1.22)
MONOCYTES NFR BLD AUTO: 8 % (ref 4–12)
NEUTROPHILS # BLD AUTO: 10.63 THOUSANDS/ÂΜL (ref 1.85–7.62)
NEUTS SEG NFR BLD AUTO: 77 % (ref 43–75)
NRBC BLD AUTO-RTO: 0 /100 WBCS
PLATELET # BLD AUTO: 224 THOUSANDS/UL (ref 149–390)
PMV BLD AUTO: 9.8 FL (ref 8.9–12.7)
POTASSIUM SERPL-SCNC: 4 MMOL/L (ref 3.5–5.3)
PROT SERPL-MCNC: 7.3 G/DL (ref 6.4–8.4)
RBC # BLD AUTO: 4.18 MILLION/UL (ref 3.88–5.62)
SODIUM SERPL-SCNC: 134 MMOL/L (ref 135–147)
WBC # BLD AUTO: 13.85 THOUSAND/UL (ref 4.31–10.16)

## 2025-07-16 PROCEDURE — 96361 HYDRATE IV INFUSION ADD-ON: CPT

## 2025-07-16 PROCEDURE — 83605 ASSAY OF LACTIC ACID: CPT | Performed by: INTERNAL MEDICINE

## 2025-07-16 PROCEDURE — 87040 BLOOD CULTURE FOR BACTERIA: CPT | Performed by: INTERNAL MEDICINE

## 2025-07-16 PROCEDURE — 83690 ASSAY OF LIPASE: CPT | Performed by: INTERNAL MEDICINE

## 2025-07-16 PROCEDURE — 71275 CT ANGIOGRAPHY CHEST: CPT

## 2025-07-16 PROCEDURE — 99285 EMERGENCY DEPT VISIT HI MDM: CPT

## 2025-07-16 PROCEDURE — 74174 CTA ABD&PLVS W/CONTRAST: CPT

## 2025-07-16 PROCEDURE — 99223 1ST HOSP IP/OBS HIGH 75: CPT | Performed by: SURGERY

## 2025-07-16 PROCEDURE — 84484 ASSAY OF TROPONIN QUANT: CPT | Performed by: INTERNAL MEDICINE

## 2025-07-16 PROCEDURE — 99285 EMERGENCY DEPT VISIT HI MDM: CPT | Performed by: INTERNAL MEDICINE

## 2025-07-16 PROCEDURE — 36415 COLL VENOUS BLD VENIPUNCTURE: CPT | Performed by: INTERNAL MEDICINE

## 2025-07-16 PROCEDURE — 85025 COMPLETE CBC W/AUTO DIFF WBC: CPT | Performed by: INTERNAL MEDICINE

## 2025-07-16 PROCEDURE — 96375 TX/PRO/DX INJ NEW DRUG ADDON: CPT

## 2025-07-16 PROCEDURE — 96376 TX/PRO/DX INJ SAME DRUG ADON: CPT

## 2025-07-16 PROCEDURE — 96374 THER/PROPH/DIAG INJ IV PUSH: CPT

## 2025-07-16 PROCEDURE — 80053 COMPREHEN METABOLIC PANEL: CPT | Performed by: INTERNAL MEDICINE

## 2025-07-16 PROCEDURE — 87040 BLOOD CULTURE FOR BACTERIA: CPT | Performed by: PHYSICIAN ASSISTANT

## 2025-07-16 PROCEDURE — 93005 ELECTROCARDIOGRAM TRACING: CPT

## 2025-07-16 RX ORDER — PROMETHAZINE HYDROCHLORIDE 25 MG/ML
25 INJECTION, SOLUTION INTRAMUSCULAR; INTRAVENOUS EVERY 4 HOURS PRN
Status: DISCONTINUED | OUTPATIENT
Start: 2025-07-16 | End: 2025-07-19 | Stop reason: HOSPADM

## 2025-07-16 RX ORDER — HYDROMORPHONE HCL/PF 1 MG/ML
0.5 SYRINGE (ML) INJECTION EVERY 4 HOURS PRN
Status: DISCONTINUED | OUTPATIENT
Start: 2025-07-16 | End: 2025-07-18

## 2025-07-16 RX ORDER — ONDANSETRON 2 MG/ML
4 INJECTION INTRAMUSCULAR; INTRAVENOUS ONCE
Status: DISCONTINUED | OUTPATIENT
Start: 2025-07-16 | End: 2025-07-16

## 2025-07-16 RX ORDER — HYDROMORPHONE HCL/PF 1 MG/ML
1 SYRINGE (ML) INJECTION ONCE
Status: COMPLETED | OUTPATIENT
Start: 2025-07-16 | End: 2025-07-16

## 2025-07-16 RX ORDER — KETOROLAC TROMETHAMINE 30 MG/ML
30 INJECTION, SOLUTION INTRAMUSCULAR; INTRAVENOUS ONCE
Status: DISCONTINUED | OUTPATIENT
Start: 2025-07-16 | End: 2025-07-16

## 2025-07-16 RX ORDER — ACETAMINOPHEN 10 MG/ML
1000 INJECTION, SOLUTION INTRAVENOUS EVERY 6 HOURS SCHEDULED
Status: DISPENSED | OUTPATIENT
Start: 2025-07-16 | End: 2025-07-18

## 2025-07-16 RX ORDER — SODIUM CHLORIDE, SODIUM LACTATE, POTASSIUM CHLORIDE, CALCIUM CHLORIDE 600; 310; 30; 20 MG/100ML; MG/100ML; MG/100ML; MG/100ML
125 INJECTION, SOLUTION INTRAVENOUS CONTINUOUS
Status: DISCONTINUED | OUTPATIENT
Start: 2025-07-16 | End: 2025-07-19

## 2025-07-16 RX ORDER — HYDROMORPHONE HCL IN WATER/PF 6 MG/30 ML
0.2 PATIENT CONTROLLED ANALGESIA SYRINGE INTRAVENOUS EVERY 4 HOURS PRN
Status: DISCONTINUED | OUTPATIENT
Start: 2025-07-16 | End: 2025-07-18

## 2025-07-16 RX ORDER — ONDANSETRON 2 MG/ML
4 INJECTION INTRAMUSCULAR; INTRAVENOUS EVERY 6 HOURS PRN
Status: DISCONTINUED | OUTPATIENT
Start: 2025-07-16 | End: 2025-07-19 | Stop reason: HOSPADM

## 2025-07-16 RX ORDER — ONDANSETRON 2 MG/ML
4 INJECTION INTRAMUSCULAR; INTRAVENOUS ONCE
Status: COMPLETED | OUTPATIENT
Start: 2025-07-16 | End: 2025-07-16

## 2025-07-16 RX ORDER — HEPARIN SODIUM 5000 [USP'U]/ML
5000 INJECTION, SOLUTION INTRAVENOUS; SUBCUTANEOUS EVERY 8 HOURS SCHEDULED
Status: DISCONTINUED | OUTPATIENT
Start: 2025-07-16 | End: 2025-07-19 | Stop reason: HOSPADM

## 2025-07-16 RX ORDER — HYDROMORPHONE HCL/PF 1 MG/ML
0.5 SYRINGE (ML) INJECTION EVERY 4 HOURS PRN
Status: DISCONTINUED | OUTPATIENT
Start: 2025-07-16 | End: 2025-07-19 | Stop reason: HOSPADM

## 2025-07-16 RX ORDER — FENTANYL CITRATE 50 UG/ML
75 INJECTION, SOLUTION INTRAMUSCULAR; INTRAVENOUS ONCE
Refills: 0 | Status: COMPLETED | OUTPATIENT
Start: 2025-07-16 | End: 2025-07-16

## 2025-07-16 RX ADMIN — FENTANYL CITRATE 75 MCG: 50 INJECTION INTRAMUSCULAR; INTRAVENOUS at 14:37

## 2025-07-16 RX ADMIN — PIPERACILLIN AND TAZOBACTAM 4.5 G: 4; .5 INJECTION, POWDER, FOR SOLUTION INTRAVENOUS at 19:54

## 2025-07-16 RX ADMIN — HEPARIN SODIUM 5000 UNITS: 5000 INJECTION INTRAVENOUS; SUBCUTANEOUS at 21:01

## 2025-07-16 RX ADMIN — AMPICILLIN SODIUM AND SULBACTAM SODIUM 3 G: 2; 1 INJECTION, POWDER, FOR SOLUTION INTRAMUSCULAR; INTRAVENOUS at 15:19

## 2025-07-16 RX ADMIN — PROMETHAZINE HYDROCHLORIDE 25 MG: 25 INJECTION INTRAMUSCULAR; INTRAVENOUS at 19:21

## 2025-07-16 RX ADMIN — HYDROMORPHONE HYDROCHLORIDE 1 MG: 1 INJECTION, SOLUTION INTRAMUSCULAR; INTRAVENOUS; SUBCUTANEOUS at 11:27

## 2025-07-16 RX ADMIN — ACETAMINOPHEN 1000 MG: 10 INJECTION INTRAVENOUS at 14:32

## 2025-07-16 RX ADMIN — SODIUM CHLORIDE 1000 ML: 0.9 INJECTION, SOLUTION INTRAVENOUS at 19:23

## 2025-07-16 RX ADMIN — SODIUM CHLORIDE 1000 ML: 0.9 INJECTION, SOLUTION INTRAVENOUS at 11:27

## 2025-07-16 RX ADMIN — ONDANSETRON 4 MG: 2 INJECTION INTRAMUSCULAR; INTRAVENOUS at 14:34

## 2025-07-16 RX ADMIN — ACETAMINOPHEN 1000 MG: 10 INJECTION INTRAVENOUS at 21:00

## 2025-07-16 RX ADMIN — HYDROMORPHONE HYDROCHLORIDE 0.5 MG: 1 INJECTION, SOLUTION INTRAMUSCULAR; INTRAVENOUS; SUBCUTANEOUS at 17:52

## 2025-07-16 RX ADMIN — HYDROMORPHONE HYDROCHLORIDE 1 MG: 1 INJECTION, SOLUTION INTRAMUSCULAR; INTRAVENOUS; SUBCUTANEOUS at 12:17

## 2025-07-16 RX ADMIN — HEPARIN SODIUM 5000 UNITS: 5000 INJECTION INTRAVENOUS; SUBCUTANEOUS at 15:19

## 2025-07-16 RX ADMIN — IOHEXOL 100 ML: 350 INJECTION, SOLUTION INTRAVENOUS at 12:05

## 2025-07-16 RX ADMIN — ONDANSETRON 4 MG: 2 INJECTION INTRAMUSCULAR; INTRAVENOUS at 11:22

## 2025-07-16 RX ADMIN — SODIUM CHLORIDE, SODIUM LACTATE, POTASSIUM CHLORIDE, AND CALCIUM CHLORIDE 125 ML/HR: .6; .31; .03; .02 INJECTION, SOLUTION INTRAVENOUS at 20:27

## 2025-07-16 NOTE — H&P
"H&P - Surgery-General   Name: Jean Pierre Amos 49 y.o. male I MRN: [id number]  Unit/Bed#: ED 14 I Date of Admission: 7/16/2025   Date of Service: 7/16/2025 I Hospital Day: 0     Assessment & Plan  Diverticulitis of large intestine with perforation  49-year-old male with PMH of hypertension, hyperlipidemia, remote history of testicular cancer status post lymph node dissection, orchiectomy, and radiation therapy presenting with abdominal pain x 3 days.  Found to have acute diverticulitis of descending colon w perforation     Tmax 100.2, remainder VSS on room air  Leukocytosis, WBC 13.8  Hemoglobin stable at 12  Metabolic panel essentially unremarkable, renal function WNL    7/16 CT CAP \"Acute perforated descending colonic diverticulitis. No abscess.\"     Past surgical history significant for testicular cancer requiring lymph node dissection and orchiectomy over 10 years ago, followed by reoperation 2 years after after initial procedure to \"remove scar tissue \". History also significant for open left inguinal hernia repair.    Plan:  - Admit to surgical service for trial of conservative management of acute diverticulitis.  Discussed that this includes nothing to eat or drink, IV antibiotics, IV hydration and close monitoring with serial exams and lab work. Discussed disease process. Patient made aware that if he clinically worsens or fails to improve he may require operation and bowel resection for definitive management.  - NPO  - IV antibiotics   - IVF hydration   - follow up lactic and blood cx   - trend AM labs, monitor vitals   - PRN analgesia/antiemetics   -DVT prophylaxis: Subcu heparin and SCDs  Essential hypertension      History of Present Illness   Jean Pierre Amos is a 49 y.o. male with PMH of hypertension, hyperlipidemia, testicular cancer status post orchiectomy, lymph node dissection, radiation therapy who presents with left lower quadrant abdominal pain x 3 days.  He reports he initially noted pain Monday evening " and since this time his pain has been worsening.  He was sent home from work today and presented to the ED for further evaluation.  Endorses episodes of emesis while in ED.  Last BM this morning, normal in character.  Denies fevers but does report chills on and off over the past few days.  Surgical history significant for orchiectomy, lymph node dissection, open left inguinal hernia repair    Review of Systems   Constitutional:  Positive for chills. Negative for fever.   HENT:  Negative for congestion.    Respiratory:  Negative for chest tightness and shortness of breath.    Cardiovascular:  Negative for chest pain.   Gastrointestinal:  Positive for abdominal pain and vomiting. Negative for nausea.   Genitourinary:  Negative for difficulty urinating.   Neurological:  Negative for dizziness and light-headedness.   Psychiatric/Behavioral:  Negative for confusion.      Medical History Review: I have reviewed the patient's PMH, PSH, Social History, Family History, Meds, and Allergies     Objective :  Temp:  [100.2 °F (37.9 °C)] 100.2 °F (37.9 °C)  HR:  [67-79] 67  BP: (105-124)/(60-69) 124/60  Resp:  [18-20] 18  SpO2:  [98 %-99 %] 98 %  O2 Device: None (Room air)      Physical Exam  Constitutional:       Appearance: He is not ill-appearing, toxic-appearing or diaphoretic.   HENT:      Head: Normocephalic and atraumatic.     Cardiovascular:      Rate and Rhythm: Normal rate.   Pulmonary:      Effort: No respiratory distress.   Abdominal:      General: There is no distension.      Palpations: Abdomen is soft.      Tenderness: There is abdominal tenderness. There is guarding and rebound (LLQ).     Skin:     General: Skin is warm and dry.     Neurological:      General: No focal deficit present.      Mental Status: He is alert and oriented to person, place, and time.     Psychiatric:         Mood and Affect: Mood normal.         Behavior: Behavior normal.         Lab Results: I have reviewed the following results:  Recent  Labs     07/16/25  1122 07/16/25  1125   WBC 13.85*  --    HGB 12.0  --    HCT 36.9  --      --    SODIUM 134*  --    K 4.0  --    CL 98  --    CO2 29  --    BUN 17  --    CREATININE 0.97  --    GLUC 99  --    AST 16  --    ALT 21  --    ALB 4.2  --    TBILI 0.71  --    ALKPHOS 46  --    HSTNI0  --  <2       Imaging Results Review: No pertinent imaging studies reviewed.  Other Study Results Review: No additional pertinent studies reviewed.    VTE Pharmacologic Prophylaxis: VTE covered by:    None     VTE Mechanical Prophylaxis: sequential compression device      Corina Ross, PAC  07/16/25

## 2025-07-16 NOTE — ED TRIAGE NOTES
"Ambulatory w/complaint of sudden onset abdominal pain which started Monday night; significant abdominal history of surgeries w/last being x10 years ago; states inability to walk d/t pain; taking tylenol PM w/no relief; admits to nausea & chills; denies vomiting, chills and diarrhea; further admits to chest pain which started yesterday; denies SOB; states \"I went to work today and was only able to stay 4 hours\"   "

## 2025-07-16 NOTE — ASSESSMENT & PLAN NOTE
"49-year-old male with PMH of hypertension, hyperlipidemia, remote history of testicular cancer status post lymph node dissection, orchiectomy, and radiation therapy presenting with abdominal pain x 3 days.  Found to have acute diverticulitis of descending colon w perforation     Tmax 100.2, remainder VSS on room air  Leukocytosis, WBC 13.8  Hemoglobin stable at 12  Metabolic panel essentially unremarkable, renal function WNL    7/16 CT CAP \"Acute perforated descending colonic diverticulitis. No abscess.\"     Past surgical history significant for testicular cancer requiring lymph node dissection and orchiectomy over 10 years ago, followed by reoperation 2 years after after initial procedure to \"remove scar tissue \". History also significant for open left inguinal hernia repair.    Plan:  - Admit to surgical service for trial of conservative management of acute diverticulitis.  Discussed that this includes nothing to eat or drink, IV antibiotics, IV hydration and close monitoring with serial exams and lab work. Discussed disease process. Patient made aware that if he clinically worsens or fails to improve he may require operation and bowel resection for definitive management.  - NPO  - IV antibiotics   - IVF hydration   - follow up lactic and blood cx   - trend AM labs, monitor vitals   - PRN analgesia/antiemetics   -DVT prophylaxis: Subcu heparin and SCDs  "

## 2025-07-16 NOTE — ED PROVIDER NOTES
Time reflects when diagnosis was documented in both MDM as applicable and the Disposition within this note       Time User Action Codes Description Comment    7/16/2025  1:58 PM Leandro Gallegos Add [K57.20] Diverticulitis of large intestine with perforation without bleeding           ED Disposition       ED Disposition   Admit    Condition   Stable    Date/Time   Wed Jul 16, 2025  1:58 PM    Comment   Case was discussed with Dr Edmondson and the patient's admission status was agreed to be Admission Status: inpatient status to the service of Dr. Edmondson .               Assessment & Plan       Medical Decision Making  This is a 49-year-old male with known history of testicular cancer dating back greater than 10 years now, history of abdominal surgeries with recurrent hernias, status post appendectomy presents with abdominal pain for the last 3 days.  Just not been feeling well.  Has been drinking Hal D a lot over the last several days.  He had some diarrhea initially, then had 3 bowel movements which were normal yesterday.  Just feels backed up, bloated, and not feeling well.  The pain is described as sharp and stabbing in the abdomen, without radiation.  He did describe some chest discomfort in the past.  He is discontinued off isosorbide in the past when he was discharged from the hospital with chest pain and ruled out.  Occurred about a year ago.    Patient has a known abdominal aneurysm measuring about 43 mm.  Again denies any back pain associated with that it has been 3 days in onset.  He is a longstanding hypertensive, hyperlipidemic both of which been well-controlled.  Non-smoker nondrinker.    Differential includes obstruction related to prior history of surgeries and radiation for his testicular cancer, and lymph node dissection prior to the radiation.  He also be primary diverticulitis, gallbladder disease, ischemic related to his abdominal aneurysm or even abdominal aneurysm expansion.    Amount and/or  Complexity of Data Reviewed  Labs: ordered.     Details: WBC count of 13,000.  Radiology: ordered.     Details: CT revealing diverticular perforation and diverticulitis.  ECG/medicine tests: ordered and independent interpretation performed.     Details: Normal sinus rhythm at 69 bpm, no acute abnormalities.  Normal axis normal intervals    Risk  Prescription drug management.  Decision regarding hospitalization.  Risk Details: Discussed case with general surgery.  Being admitted for IV antibiotics.  Started Unasyn within the emergency room.  No evidence of aortic dissection.        ED Course as of 07/16/25 1414   Wed Jul 16, 2025   1413 Patient's pain was improved.  IV antibiotics started due to acute findings on CAT scan.  Discussed case with general surgery who will admit patient to the floor.       Medications   ketorolac (TORADOL) injection 30 mg (0 mg Intravenous Hold 7/16/25 1300)   ampicillin-sulbactam (UNASYN) 3 g in sodium chloride 0.9 % 100 mL IVPB (has no administration in time range)   fentaNYL injection 75 mcg (has no administration in time range)   ondansetron (ZOFRAN) injection 4 mg (4 mg Intravenous Given 7/16/25 1122)   HYDROmorphone (DILAUDID) injection 1 mg (1 mg Intravenous Given 7/16/25 1127)   sodium chloride 0.9 % bolus 1,000 mL (0 mL Intravenous Stopped 7/16/25 1227)   HYDROmorphone (DILAUDID) injection 1 mg (1 mg Intravenous Given 7/16/25 1217)   iohexol (OMNIPAQUE) 350 MG/ML injection (MULTI-DOSE) 100 mL (100 mL Intravenous Given 7/16/25 1205)       ED Risk Strat Scores   HEART Risk Score      Flowsheet Row Most Recent Value   Heart Score Risk Calculator    History 0 Filed at: 07/16/2025 1414   ECG 0 Filed at: 07/16/2025 1414   Age 1 Filed at: 07/16/2025 1414   Risk Factors 1 Filed at: 07/16/2025 1414   Troponin 0 Filed at: 07/16/2025 1414   HEART Score 2 Filed at: 07/16/2025 1414          HEART Risk Score      Flowsheet Row Most Recent Value   Heart Score Risk Calculator    History 0 Filed  "at: 07/16/2025 1414   ECG 0 Filed at: 07/16/2025 1414   Age 1 Filed at: 07/16/2025 1414   Risk Factors 1 Filed at: 07/16/2025 1414   Troponin 0 Filed at: 07/16/2025 1414   HEART Score 2 Filed at: 07/16/2025 1414                      No data recorded        SBIRT 20yo+      Flowsheet Row Most Recent Value   Initial Alcohol Screen: US AUDIT-C     1. How often do you have a drink containing alcohol? 0 Filed at: 07/16/2025 1102   2. How many drinks containing alcohol do you have on a typical day you are drinking?  0 Filed at: 07/16/2025 1102   3a. Male UNDER 65: How often do you have five or more drinks on one occasion? 0 Filed at: 07/16/2025 1102   Audit-C Score 0 Filed at: 07/16/2025 1102   HENOK: How many times in the past year have you...    Used an illegal drug or used a prescription medication for non-medical reasons? Never Filed at: 07/16/2025 1102                            History of Present Illness       Chief Complaint   Patient presents with    Abdominal Pain     Ambulatory w/complaint of sudden onset abdominal pain which started Monday night; significant abdominal history of surgeries w/last being x10 years ago; states inability to walk d/t pain; taking tylenol PM w/no relief; admits to nausea & chills; denies vomiting, chills and diarrhea; further admits to chest pain which started yesterday; denies SOB; states \"I went to work today and was only able to stay 4 hours\"     Chest Pain       Past Medical History[1]   Past Surgical History[2]   Family History[3]   Social History[4]   E-Cigarette/Vaping    E-Cigarette Use Never User       E-Cigarette/Vaping Substances    Nicotine No     THC No     CBD No     Flavoring No     Other No     Unknown No       I have reviewed and agree with the history as documented.     This is a 49-year-old male with known history of testicular cancer dating back greater than 10 years now, history of abdominal surgeries with recurrent hernias, status post appendectomy presents with " abdominal pain for the last 3 days.  Just not been feeling well.  Has been drinking Hal D a lot over the last several days.  He had some diarrhea initially, then had 3 bowel movements which were normal yesterday.  Just feels backed up, bloated, and not feeling well.  The pain is described as sharp and stabbing in the abdomen, without radiation.  He did describe some chest discomfort in the past.  He is discontinued off isosorbide in the past when he was discharged from the hospital with chest pain and ruled out.  Occurred about a year ago.        Review of Systems   Constitutional:  Negative for chills and fever.   HENT:  Negative for ear pain and sore throat.    Eyes:  Negative for visual disturbance.   Respiratory:  Negative for cough and shortness of breath.    Cardiovascular:  Negative for chest pain and palpitations.   Gastrointestinal:  Positive for abdominal distention, abdominal pain and nausea. Negative for vomiting.        Change in bowel habits, 1 day of diarrhea and then 3 bowel movements yesterday which were normal but more than he usually has.   Genitourinary:  Negative for dysuria and hematuria.   Musculoskeletal:  Negative for arthralgias and back pain.   Skin:  Negative for color change and rash.   Neurological:  Negative for seizures and syncope.   All other systems reviewed and are negative.          Objective       ED Triage Vitals   Temperature Pulse Blood Pressure Respirations SpO2 Patient Position - Orthostatic VS   07/16/25 1344 07/16/25 1100 07/16/25 1100 07/16/25 1100 07/16/25 1100 07/16/25 1100   100.2 °F (37.9 °C) 79 105/69 20 99 % Sitting      Temp Source Heart Rate Source BP Location FiO2 (%) Pain Score    07/16/25 1344 07/16/25 1100 07/16/25 1100 -- 07/16/25 1100    Temporal Monitor Left arm  10 - Worst Possible Pain      Vitals      Date and Time Temp Pulse SpO2 Resp BP Pain Score FACES Pain Rating User   07/16/25 1344 100.2 °F (37.9 °C) -- -- -- -- -- --    07/16/25 1300 -- 67 98  % 18 124/60 -- --    07/16/25 1217 -- -- -- -- -- 9 after ct scan --    07/16/25 1127 -- -- -- -- -- 9 --    07/16/25 1100 -- 79 99 % 20 105/69 10 - Worst Possible Pain -- TB            Physical Exam  Vitals and nursing note reviewed.   Constitutional:       General: He is not in acute distress.     Appearance: He is well-developed.   HENT:      Head: Normocephalic and atraumatic.     Eyes:      Conjunctiva/sclera: Conjunctivae normal.       Cardiovascular:      Rate and Rhythm: Normal rate and regular rhythm.      Heart sounds: Normal heart sounds. No murmur heard.  Pulmonary:      Effort: Pulmonary effort is normal. No respiratory distress.      Breath sounds: Normal breath sounds.   Abdominal:      General: Bowel sounds are decreased.      Palpations: Abdomen is soft.      Tenderness: There is generalized abdominal tenderness. There is guarding and rebound.     Musculoskeletal:         General: No swelling.      Cervical back: Neck supple.     Skin:     General: Skin is warm and dry.      Capillary Refill: Capillary refill takes less than 2 seconds.     Neurological:      General: No focal deficit present.      Mental Status: He is alert. He is disoriented.     Psychiatric:         Mood and Affect: Mood normal.         Results Reviewed       Procedure Component Value Units Date/Time    Lactic acid, plasma (w/reflex if result > 2.0) [[id number]] Collected: 07/16/25 1413    Lab Status: No result Specimen: Blood     Blood culture #2 [[id number]] Collected: 07/16/25 1413    Lab Status: No result Specimen: Blood from Arm, Left     HS Troponin I 4hr [[id number]]     Lab Status: No result Specimen: Blood     HS Troponin 0hr (reflex protocol) [[id number]]  (Normal) Collected: 07/16/25 1125    Lab Status: Final result Specimen: Blood from Arm, Left Updated: 07/16/25 1153     hs TnI 0hr <2 ng/L     HS Troponin I 2hr [[id number]]     Lab Status: No result Specimen: Blood     CMP [[id number]]  (Abnormal) Collected: 07/16/25  1122    Lab Status: Final result Specimen: Blood from Arm, Left Updated: 07/16/25 1149     Sodium 134 mmol/L      Potassium 4.0 mmol/L      Chloride 98 mmol/L      CO2 29 mmol/L      ANION GAP 7 mmol/L      BUN 17 mg/dL      Creatinine 0.97 mg/dL      Glucose 99 mg/dL      Calcium 9.2 mg/dL      AST 16 U/L      ALT 21 U/L      Alkaline Phosphatase 46 U/L      Total Protein 7.3 g/dL      Albumin 4.2 g/dL      Total Bilirubin 0.71 mg/dL      eGFR 90 ml/min/1.73sq m     Narrative:      [employer] guidelines for Chronic Kidney Disease (CKD):     Stage 1 with normal or high GFR (GFR > 90 mL/min/1.73 square meters)    Stage 2 Mild CKD (GFR = 60-89 mL/min/1.73 square meters)    Stage 3A Moderate CKD (GFR = 45-59 mL/min/1.73 square meters)    Stage 3B Moderate CKD (GFR = 30-44 mL/min/1.73 square meters)    Stage 4 Severe CKD (GFR = 15-29 mL/min/1.73 square meters)    Stage 5 End Stage CKD (GFR <15 mL/min/1.73 square meters)  Note: GFR calculation is accurate only with a steady state creatinine    Lipase [[id number]]  (Normal) Collected: 07/16/25 1122    Lab Status: Final result Specimen: Blood from Arm, Left Updated: 07/16/25 1149     Lipase 25 u/L     CBC and differential [[id number]]  (Abnormal) Collected: 07/16/25 1122    Lab Status: Final result Specimen: Blood from Arm, Left Updated: 07/16/25 1131     WBC 13.85 Thousand/uL      RBC 4.18 Million/uL      Hemoglobin 12.0 g/dL      Hematocrit 36.9 %      MCV 88 fL      MCH 28.7 pg      MCHC 32.5 g/dL      RDW 12.1 %      MPV 9.8 fL      Platelets 224 Thousands/uL      nRBC 0 /100 WBCs      Segmented % 77 %      Immature Grans % 0 %      Lymphocytes % 13 %      Monocytes % 8 %      Eosinophils Relative 2 %      Basophils Relative 0 %      Absolute Neutrophils 10.63 Thousands/µL      Absolute Immature Grans 0.05 Thousand/uL      Absolute Lymphocytes 1.76 Thousands/µL      Absolute Monocytes 1.17 Thousand/µL      Eosinophils Absolute 0.21 Thousand/µL       Basophils Absolute 0.03 Thousands/µL     UA (URINE) with reflex to Scope [[id number]]     Lab Status: No result Specimen: Urine             CTA dissection protocol chest abdomen pelvis w wo contrast   Final Interpretation by Tamir Soto MD (07/16 1343)      Acute perforated descending colonic diverticulitis. No abscess.      No aortic dissection or intramural hematoma.         I personally discussed this study with ANASTACIO SPEARS on 7/16/2025 1:19 PM.            Resident: Florencia Méndez I, the attending radiologist, have reviewed the images and agree with the final report above.      Workstation performed: [id number]             Procedures    ED Medication and Procedure Management   Prior to Admission Medications   Prescriptions Last Dose Informant Patient Reported? Taking?   ergocalciferol (VITAMIN D2) 50,000 units  Self Yes No   Sig: Take 50,000 Units by mouth every 14 (fourteen) days   ibuprofen (MOTRIN) 200 mg tablet  Self Yes No   Sig: Take 200 mg by mouth every 6 (six) hours as needed for mild pain As needed   isosorbide mononitrate (IMDUR) 60 mg 24 hr tablet   No No   Sig: Take 1 tablet (60 mg total) by mouth daily   Patient not taking: Reported on 8/5/2024   lisinopril (ZESTRIL) 40 mg tablet  Self Yes No   Sig: Take 40 mg by mouth daily   methylPREDNISolone 4 MG tablet therapy pack   No No   Sig: Use as directed on package   rosuvastatin (CRESTOR) 40 MG tablet  Self Yes No   Sig: Take 40 mg by mouth daily      Facility-Administered Medications: None     Current Discharge Medication List        CONTINUE these medications which have NOT CHANGED    Details   ergocalciferol (VITAMIN D2) 50,000 units Take 50,000 Units by mouth every 14 (fourteen) days      isosorbide mononitrate (IMDUR) 60 mg 24 hr tablet Take 1 tablet (60 mg total) by mouth daily  Qty: 30 tablet, Refills: 0    Associated Diagnoses: Chest pain, unspecified type; Essential hypertension      lisinopril (ZESTRIL) 40 mg tablet  Take 40 mg by mouth daily      rosuvastatin (CRESTOR) 40 MG tablet Take 40 mg by mouth daily           STOP taking these medications       ibuprofen (MOTRIN) 200 mg tablet Comments:   Reason for Stopping:         methylPREDNISolone 4 MG tablet therapy pack Comments:   Reason for Stopping:             No discharge procedures on file.  ED SEPSIS DOCUMENTATION   Time reflects when diagnosis was documented in both MDM as applicable and the Disposition within this note       Time User Action Codes Description Comment    2025  1:58 PM Leandro Gallegos Add [K57.20] Diverticulitis of large intestine with perforation without bleeding                    [1]   Past Medical History:  Diagnosis Date    Ascending aortic aneurysm (HCC)     Asthma     Hyperlipidemia     Hypertension     Testicular cancer (HCC)    [2]   Past Surgical History:  Procedure Laterality Date    HERNIA REPAIR      LYMPHADENECTOMY      ORCHIECTOMY      and LN dissection    ROTATOR CUFF REPAIR     [3]   Family History  Problem Relation Name Age of Onset    Cancer Mother      Hypertension Father      Thyroid disease Sister      No Known Problems Daughter     [4]   Social History  Tobacco Use    Smoking status: Former     Current packs/day: 0.00     Average packs/day: 0.5 packs/day for 1.2 years (0.6 ttl pk-yrs)     Types: Cigarettes     Start date: 1992     Quit date: 10/5/1993     Years since quittin.8    Smokeless tobacco: Never   Vaping Use    Vaping status: Never Used   Substance Use Topics    Alcohol use: Yes     Alcohol/week: 1.0 - 2.0 standard drink of alcohol     Types: 1 - 2 Cans of beer per week    Drug use: Never        Leandro Gallegos DO  25 1414

## 2025-07-16 NOTE — PLAN OF CARE
Problem: PAIN - ADULT  Goal: Verbalizes/displays adequate comfort level or baseline comfort level  Description: Interventions:  - Encourage patient to monitor pain and request assistance  - Assess pain using appropriate pain scale  - Administer analgesics as ordered based on type and severity of pain and evaluate response  - Implement non-pharmacological measures as appropriate and evaluate response  - Consider cultural and social influences on pain and pain management  - Notify physician/advanced practitioner if interventions unsuccessful or patient reports new pain  - Educate patient/family on pain management process including their role and importance of  reporting pain   - Provide non-pharmacologic/complimentary pain relief interventions  Outcome: Progressing     Problem: INFECTION - ADULT  Goal: Absence or prevention of progression during hospitalization  Description: INTERVENTIONS:  - Assess and monitor for signs and symptoms of infection  - Monitor lab/diagnostic results  - Monitor all insertion sites, i.e. indwelling lines, tubes, and drains  - Monitor endotracheal if appropriate and nasal secretions for changes in amount and color  - Burns appropriate cooling/warming therapies per order  - Administer medications as ordered  - Instruct and encourage patient and family to use good hand hygiene technique  - Identify and instruct in appropriate isolation precautions for identified infection/condition  Outcome: Progressing     Problem: SAFETY ADULT  Goal: Maintain or return to baseline ADL function  Description: INTERVENTIONS:  -  Assess patient's ability to carry out ADLs; assess patient's baseline for ADL function and identify physical deficits which impact ability to perform ADLs (bathing, care of mouth/teeth, toileting, grooming, dressing, etc.)  - Assess/evaluate cause of self-care deficits   - Assess range of motion  - Assess patient's mobility; develop plan if impaired  - Assess patient's need for  assistive devices and provide as appropriate  - Encourage maximum independence but intervene and supervise when necessary  - Involve family in performance of ADLs  - Assess for home care needs following discharge   - Consider OT consult to assist with ADL evaluation and planning for discharge  - Provide patient education as appropriate  - Monitor functional capacity and physical performance, use of AM PAC & JH-HLM   - Monitor gait, balance and fatigue with ambulation    Outcome: Progressing     Problem: Knowledge Deficit  Goal: Patient/family/caregiver demonstrates understanding of disease process, treatment plan, medications, and discharge instructions  Description: Complete learning assessment and assess knowledge base.  Interventions:  - Provide teaching at level of understanding  - Provide teaching via preferred learning methods  Outcome: Progressing     Problem: DISCHARGE PLANNING  Goal: Discharge to home or other facility with appropriate resources  Description: INTERVENTIONS:  - Identify barriers to discharge w/patient and caregiver  - Arrange for needed discharge resources and transportation as appropriate  - Identify discharge learning needs (meds, wound care, etc.)  - Arrange for interpretive services to assist at discharge as needed  - Refer to Case Management Department for coordinating discharge planning if the patient needs post-hospital services based on physician/advanced practitioner order or complex needs related to functional status, cognitive ability, or social support system  Outcome: Progressing

## 2025-07-17 LAB
ANION GAP SERPL CALCULATED.3IONS-SCNC: 7 MMOL/L (ref 4–13)
ATRIAL RATE: 69 BPM
ATRIAL RATE: 69 BPM
BASOPHILS # BLD AUTO: 0.02 THOUSANDS/ÂΜL (ref 0–0.1)
BASOPHILS NFR BLD AUTO: 0 % (ref 0–1)
BUN SERPL-MCNC: 13 MG/DL (ref 5–25)
CALCIUM SERPL-MCNC: 8.3 MG/DL (ref 8.4–10.2)
CHLORIDE SERPL-SCNC: 101 MMOL/L (ref 96–108)
CO2 SERPL-SCNC: 26 MMOL/L (ref 21–32)
CREAT SERPL-MCNC: 0.88 MG/DL (ref 0.6–1.3)
EOSINOPHIL # BLD AUTO: 0.08 THOUSAND/ÂΜL (ref 0–0.61)
EOSINOPHIL NFR BLD AUTO: 1 % (ref 0–6)
ERYTHROCYTE [DISTWIDTH] IN BLOOD BY AUTOMATED COUNT: 12.3 % (ref 11.6–15.1)
GFR SERPL CREATININE-BSD FRML MDRD: 100 ML/MIN/1.73SQ M
GLUCOSE SERPL-MCNC: 109 MG/DL (ref 65–140)
HCT VFR BLD AUTO: 33 % (ref 36.5–49.3)
HGB BLD-MCNC: 10.8 G/DL (ref 12–17)
IMM GRANULOCYTES # BLD AUTO: 0.11 THOUSAND/UL (ref 0–0.2)
IMM GRANULOCYTES NFR BLD AUTO: 1 % (ref 0–2)
LYMPHOCYTES # BLD AUTO: 1.28 THOUSANDS/ÂΜL (ref 0.6–4.47)
LYMPHOCYTES NFR BLD AUTO: 9 % (ref 14–44)
MAGNESIUM SERPL-MCNC: 1.6 MG/DL (ref 1.9–2.7)
MCH RBC QN AUTO: 29 PG (ref 26.8–34.3)
MCHC RBC AUTO-ENTMCNC: 32.7 G/DL (ref 31.4–37.4)
MCV RBC AUTO: 89 FL (ref 82–98)
MONOCYTES # BLD AUTO: 1.23 THOUSAND/ÂΜL (ref 0.17–1.22)
MONOCYTES NFR BLD AUTO: 9 % (ref 4–12)
NEUTROPHILS # BLD AUTO: 10.99 THOUSANDS/ÂΜL (ref 1.85–7.62)
NEUTS SEG NFR BLD AUTO: 80 % (ref 43–75)
NRBC BLD AUTO-RTO: 0 /100 WBCS
P AXIS: 50 DEGREES
P AXIS: 62 DEGREES
PHOSPHATE SERPL-MCNC: 3.3 MG/DL (ref 2.7–4.5)
PLATELET # BLD AUTO: 191 THOUSANDS/UL (ref 149–390)
PMV BLD AUTO: 9.8 FL (ref 8.9–12.7)
POTASSIUM SERPL-SCNC: 3.7 MMOL/L (ref 3.5–5.3)
PR INTERVAL: 188 MS
PR INTERVAL: 190 MS
QRS AXIS: -21 DEGREES
QRS AXIS: -8 DEGREES
QRSD INTERVAL: 100 MS
QRSD INTERVAL: 100 MS
QT INTERVAL: 376 MS
QT INTERVAL: 386 MS
QTC INTERVAL: 402 MS
QTC INTERVAL: 413 MS
RBC # BLD AUTO: 3.72 MILLION/UL (ref 3.88–5.62)
SODIUM SERPL-SCNC: 134 MMOL/L (ref 135–147)
T WAVE AXIS: 13 DEGREES
T WAVE AXIS: 13 DEGREES
VENTRICULAR RATE: 69 BPM
VENTRICULAR RATE: 69 BPM
WBC # BLD AUTO: 13.71 THOUSAND/UL (ref 4.31–10.16)

## 2025-07-17 PROCEDURE — 83735 ASSAY OF MAGNESIUM: CPT | Performed by: PHYSICIAN ASSISTANT

## 2025-07-17 PROCEDURE — 80048 BASIC METABOLIC PNL TOTAL CA: CPT | Performed by: PHYSICIAN ASSISTANT

## 2025-07-17 PROCEDURE — 99232 SBSQ HOSP IP/OBS MODERATE 35: CPT | Performed by: SURGERY

## 2025-07-17 PROCEDURE — 93010 ELECTROCARDIOGRAM REPORT: CPT | Performed by: INTERNAL MEDICINE

## 2025-07-17 PROCEDURE — 85025 COMPLETE CBC W/AUTO DIFF WBC: CPT | Performed by: PHYSICIAN ASSISTANT

## 2025-07-17 PROCEDURE — 84100 ASSAY OF PHOSPHORUS: CPT | Performed by: PHYSICIAN ASSISTANT

## 2025-07-17 RX ORDER — MAGNESIUM SULFATE HEPTAHYDRATE 40 MG/ML
2 INJECTION, SOLUTION INTRAVENOUS ONCE
Status: COMPLETED | OUTPATIENT
Start: 2025-07-17 | End: 2025-07-17

## 2025-07-17 RX ORDER — ZOLPIDEM TARTRATE 5 MG/1
5 TABLET ORAL
Status: DISCONTINUED | OUTPATIENT
Start: 2025-07-17 | End: 2025-07-19 | Stop reason: HOSPADM

## 2025-07-17 RX ADMIN — PIPERACILLIN AND TAZOBACTAM 4.5 G: 4; .5 INJECTION, POWDER, FOR SOLUTION INTRAVENOUS at 00:16

## 2025-07-17 RX ADMIN — HYDROMORPHONE HYDROCHLORIDE 0.5 MG: 1 INJECTION, SOLUTION INTRAMUSCULAR; INTRAVENOUS; SUBCUTANEOUS at 20:53

## 2025-07-17 RX ADMIN — HEPARIN SODIUM 5000 UNITS: 5000 INJECTION INTRAVENOUS; SUBCUTANEOUS at 14:54

## 2025-07-17 RX ADMIN — HEPARIN SODIUM 5000 UNITS: 5000 INJECTION INTRAVENOUS; SUBCUTANEOUS at 22:45

## 2025-07-17 RX ADMIN — HEPARIN SODIUM 5000 UNITS: 5000 INJECTION INTRAVENOUS; SUBCUTANEOUS at 05:11

## 2025-07-17 RX ADMIN — ACETAMINOPHEN 1000 MG: 10 INJECTION INTRAVENOUS at 10:22

## 2025-07-17 RX ADMIN — HYDROMORPHONE HYDROCHLORIDE 0.5 MG: 1 INJECTION, SOLUTION INTRAMUSCULAR; INTRAVENOUS; SUBCUTANEOUS at 14:54

## 2025-07-17 RX ADMIN — ACETAMINOPHEN 1000 MG: 10 INJECTION INTRAVENOUS at 16:47

## 2025-07-17 RX ADMIN — PIPERACILLIN AND TAZOBACTAM 4.5 G: 4; .5 INJECTION, POWDER, FOR SOLUTION INTRAVENOUS at 08:13

## 2025-07-17 RX ADMIN — PIPERACILLIN AND TAZOBACTAM 4.5 G: 4; .5 INJECTION, POWDER, FOR SOLUTION INTRAVENOUS at 16:47

## 2025-07-17 RX ADMIN — ACETAMINOPHEN 1000 MG: 10 INJECTION INTRAVENOUS at 22:45

## 2025-07-17 RX ADMIN — SODIUM CHLORIDE, SODIUM LACTATE, POTASSIUM CHLORIDE, AND CALCIUM CHLORIDE 125 ML/HR: .6; .31; .03; .02 INJECTION, SOLUTION INTRAVENOUS at 14:54

## 2025-07-17 RX ADMIN — SODIUM CHLORIDE, SODIUM LACTATE, POTASSIUM CHLORIDE, AND CALCIUM CHLORIDE 125 ML/HR: .6; .31; .03; .02 INJECTION, SOLUTION INTRAVENOUS at 05:15

## 2025-07-17 RX ADMIN — ACETAMINOPHEN 1000 MG: 10 INJECTION INTRAVENOUS at 03:00

## 2025-07-17 RX ADMIN — MAGNESIUM SULFATE HEPTAHYDRATE 2 G: 40 INJECTION, SOLUTION INTRAVENOUS at 08:13

## 2025-07-17 RX ADMIN — ZOLPIDEM TARTRATE 5 MG: 5 TABLET, FILM COATED ORAL at 22:45

## 2025-07-17 NOTE — UTILIZATION REVIEW
"Initial Clinical Review    Admission: Date/Time/Statement:   Admission Orders (From admission, onward)       Ordered        07/16/25 1359  INPATIENT ADMISSION  Once                          Orders Placed This Encounter   Procedures    INPATIENT ADMISSION     Standing Status:   Standing     Number of Occurrences:   1     Level of Care:   Med Surg [16]     Estimated length of stay:   More than 2 Midnights     Certification:   I certify that inpatient services are medically necessary for this patient for a duration of greater than two midnights. See H&P and MD Progress Notes for additional information about the patient's course of treatment.     ED Arrival Information       Expected   -    Arrival   7/16/2025 10:51    Acuity   Urgent              Means of arrival   Walk-In    Escorted by   Spouse    Service   Surgery-General    Admission type   Emergency              Arrival complaint   abd pain             Chief Complaint   Patient presents with    Abdominal Pain     Ambulatory w/complaint of sudden onset abdominal pain which started Monday night; significant abdominal history of surgeries w/last being x10 years ago; states inability to walk d/t pain; taking tylenol PM w/no relief; admits to nausea & chills; denies vomiting, chills and diarrhea; further admits to chest pain which started yesterday; denies SOB; states \"I went to work today and was only able to stay 4 hours\"     Chest Pain       Initial Presentation: 49 y.o. male presents to ED from home with left lower quadrant abdominal pain  for 3 days.  Pain  worsened over the past  few days.   Sent home from work the day of admission.  Had episodes of emesis in  ED.  Last  B the day of admission.  Has intermittent chills, no fever.   PMH is  HTN, testicular cancer, S/P orchiectomy, lymph node dissection and RT.   Ct abdomen shows acute perforated descending colonic  diverticulitis, no abscess. Temp max  100.2.  Labs show  WBC  13.8, hemoglobin   12.  Admit  IP with "  Diverticulitis  of large intestine with perforation  and plan is  NPO, JODIE, IVF, pain control, antiemetics as needed, monitor labs  and blood cultures.     Date:   7/17   Day 2:   Temp max   102.5  last  evening.   WBC  now  13.71.  Continue conservative  management  with  JODIE, NPO, IVF  and F/U blood cultures.   Feels improved.   - flatus    - BM    ED Treatment-Medication Administration from 07/16/2025 1049 to 07/16/2025 1511         Date/Time Order Dose Route Action     07/16/2025 1122 ondansetron (ZOFRAN) injection 4 mg 4 mg Intravenous Given     07/16/2025 1127 HYDROmorphone (DILAUDID) injection 1 mg 1 mg Intravenous Given     07/16/2025 1127 sodium chloride 0.9 % bolus 1,000 mL 1,000 mL Intravenous New Bag     07/16/2025 1217 HYDROmorphone (DILAUDID) injection 1 mg 1 mg Intravenous Given     07/16/2025 1205 iohexol (OMNIPAQUE) 350 MG/ML injection (MULTI-DOSE) 100 mL 100 mL Intravenous Given     07/16/2025 1437 fentaNYL injection 75 mcg 75 mcg Intravenous Given     07/16/2025 1432 acetaminophen (Ofirmev) injection 1,000 mg 1,000 mg Intravenous New Bag     07/16/2025 1434 ondansetron (ZOFRAN) injection 4 mg 4 mg Intravenous Given            Scheduled Medications:  acetaminophen, 1,000 mg, Intravenous, Q6H DESTINI  heparin (porcine), 5,000 Units, Subcutaneous, Q8H DESTINI  piperacillin-tazobactam, 4.5 g, Intravenous, Q8H      Continuous IV Infusions:  lactated ringers, 125 mL/hr, Intravenous, Continuous      PRN Meds:  HYDROmorphone, 0.2 mg, Intravenous, Q4H PRN   Or  HYDROmorphone, 0.5 mg, Intravenous, Q4H PRN  HYDROmorphone, 0.5 mg, Intravenous, Q4H PRN  ondansetron, 4 mg, Intravenous, Q6H PRN  promethazine, 25 mg, Intravenous, Q4H PRN      ED Triage Vitals   Temperature Pulse Respirations Blood Pressure SpO2 Pain Score   07/16/25 1344 07/16/25 1100 07/16/25 1100 07/16/25 1100 07/16/25 1100 07/16/25 1100   100.2 °F (37.9 °C) 79 20 105/69 99 % 10 - Worst Possible Pain     Weight (last 2 days)       Date/Time Weight     07/16/25 1100 140 (308)            Vital Signs (last 3 days)       Date/Time Temp Pulse Resp BP MAP (mmHg) SpO2 O2 Device Patient Position - Orthostatic VS Pain    07/17/25 0818 -- -- -- -- -- -- -- -- 4    07/17/25 07:12:28 98.4 °F (36.9 °C) 65 18 114/66 82 99 % None (Room air) Lying --    07/17/25 05:10:48 99.4 °F (37.4 °C) 65 18 -- -- 95 % -- -- --    07/17/25 03:07:17 100.1 °F (37.8 °C) 84 18 131/71 91 96 % -- -- --    07/16/25 21:58:35 98.3 °F (36.8 °C) 81 18 115/57 76 95 % -- -- --    07/16/25 21:20:23 102.5 °F (39.2 °C) 89 18 131/70 90 95 % -- Lying --    07/16/25 1925 -- -- -- -- -- -- -- -- 4 07/16/25 1752 -- -- -- -- -- -- -- -- 10 - Worst Possible Pain    07/16/25 1532 -- -- -- -- -- -- -- -- 6    07/16/25 15:13:56 99.4 °F (37.4 °C) 75 -- 113/68 83 95 % -- -- --    07/16/25 1445 -- 80 18 123/68 89 96 % None (Room air) Lying --    07/16/25 1437 -- -- -- -- -- -- -- -- 8    07/16/25 1344 100.2 °F (37.9 °C) -- -- -- -- -- -- -- --    07/16/25 1300 -- 67 18 124/60 85 98 % None (Room air) Lying --    07/16/25 1217 -- -- -- -- -- -- -- -- 9    07/16/25 1127 -- -- -- -- -- -- -- -- 9    07/16/25 1100 -- 79 20 105/69 85 99 % None (Room air) Sitting 10 - Worst Possible Pain              Pertinent Labs/Diagnostic Test Results:   Radiology:  CTA dissection protocol chest abdomen pelvis w wo contrast   Final Interpretation by Tamir Soto MD (07/16 1343)      Acute perforated descending colonic diverticulitis. No abscess.      No aortic dissection or intramural hematoma.         I personally discussed this study with ANASTACIO SPEARS on 7/16/2025 1:19 PM.            Resident: Florencia Méndez      I, the attending radiologist, have reviewed the images and agree with the final report above.      Workstation performed: [id number]           Cardiology:  ECG 12 lead    by Interface, Ris Results In (07/16 1346)      ECG 12 lead   Final Result by Gagan Pitt MD (07/17 1028)   Normal sinus rhythm   Slow R  wave progression   When compared with ECG of 09-Aug-2024 16:25,   No significant change was found      Confirmed by Gagan Pitt ([id number]) on 7/17/2025 10:28:05 AM        GI:          Results from last 7 days   Lab Units 07/17/25  0448 07/16/25  1122   WBC Thousand/uL 13.71* 13.85*   HEMOGLOBIN g/dL 10.8* 12.0   HEMATOCRIT % 33.0* 36.9   PLATELETS Thousands/uL 191 224   TOTAL NEUT ABS Thousands/µL 10.99* 10.63*         Results from last 7 days   Lab Units 07/17/25  0448 07/16/25  1122   SODIUM mmol/L 134* 134*   POTASSIUM mmol/L 3.7 4.0   CHLORIDE mmol/L 101 98   CO2 mmol/L 26 29   ANION GAP mmol/L 7 7   BUN mg/dL 13 17   CREATININE mg/dL 0.88 0.97   EGFR ml/min/1.73sq m 100 90   CALCIUM mg/dL 8.3* 9.2   MAGNESIUM mg/dL 1.6*  --    PHOSPHORUS mg/dL 3.3  --      Results from last 7 days   Lab Units 07/16/25  1122   AST U/L 16   ALT U/L 21   ALK PHOS U/L 46   TOTAL PROTEIN g/dL 7.3   ALBUMIN g/dL 4.2   TOTAL BILIRUBIN mg/dL 0.71         Results from last 7 days   Lab Units 07/17/25  0448 07/16/25  1122   GLUCOSE RANDOM mg/dL 109 99           Results from last 7 days   Lab Units 07/16/25  1413 07/16/25  1125   HS TNI 0HR ng/L  --  <2   HS TNI 2HR ng/L <2  --                      Results from last 7 days   Lab Units 07/16/25  1413   LACTIC ACID mmol/L 0.7                   Results from last 7 days   Lab Units 07/16/25  1122   LIPASE u/L 25               Results from last 7 days   Lab Units 07/16/25  1417 07/16/25  1413   BLOOD CULTURE  Received in Microbiology Lab. Culture in Progress. Received in Microbiology Lab. Culture in Progress.               Admitting Diagnosis: Abdominal pain [R10.9]  Diverticulitis of large intestine with perforation without bleeding [K57.20]  Age/Sex: 49 y.o. male    Network Utilization Review Department  ATTENTION: Please call with any questions or concerns to [phone] and carefully listen to the prompts so that you are directed to the right person. All voicemails are confidential.    For Discharge needs, contact Care Management DC Support Team at [phone] opt. [phone]  Send all requests for admission clinical reviews, approved or denied determinations and any other requests to dedicated fax number below belonging to the campus where the patient is receiving treatment. List of dedicated fax numbers for the Facilities:  FACILITY NAME UR FAX NUMBER   ADMISSION DENIALS (Administrative/Medical Necessity) [phone]   DISCHARGE SUPPORT TEAM (NETWORK) [phone]   PARENT CHILD HEALTH (Maternity/NICU/Pediatrics) [phone]   [facility] [phone]   [facility] [phone]   [facility] [phone]   [facility] [phone]   [facility] [phone]   [facility] [phone]   [facility] [phone]   [facility] [phone]   [facility] [phone]   [facility] [phone]   [facility] [phone]   [facility] [phone]

## 2025-07-17 NOTE — PLAN OF CARE
Problem: PAIN - ADULT  Goal: Verbalizes/displays adequate comfort level or baseline comfort level  Description: Interventions:  - Encourage patient to monitor pain and request assistance  - Assess pain using appropriate pain scale  - Administer analgesics as ordered based on type and severity of pain and evaluate response  - Implement non-pharmacological measures as appropriate and evaluate response  - Consider cultural and social influences on pain and pain management  - Notify physician/advanced practitioner if interventions unsuccessful or patient reports new pain  - Educate patient/family on pain management process including their role and importance of  reporting pain   - Provide non-pharmacologic/complimentary pain relief interventions  Outcome: Progressing     Problem: INFECTION - ADULT  Goal: Absence or prevention of progression during hospitalization  Description: INTERVENTIONS:  - Assess and monitor for signs and symptoms of infection  - Monitor lab/diagnostic results  - Monitor all insertion sites, i.e. indwelling lines, tubes, and drains  - Monitor endotracheal if appropriate and nasal secretions for changes in amount and color  - [place] appropriate cooling/warming therapies per order  - Administer medications as ordered  - Instruct and encourage patient and family to use good hand hygiene technique  - Identify and instruct in appropriate isolation precautions for identified infection/condition  Outcome: Progressing     Problem: SAFETY ADULT  Goal: Patient will remain free of falls  Description: INTERVENTIONS:  - Educate patient/family on patient safety including physical limitations  - Instruct patient to call for assistance with activity   - Consider consulting OT/PT to assist with strengthening/mobility based on AM PAC & JH-HLM score  - Consult OT/PT to assist with strengthening/mobility   - Keep Call bell within reach  - Keep bed low and locked with side rails adjusted as appropriate  - Keep  care items and personal belongings within reach  - Initiate and maintain comfort rounds  - Make Fall Risk Sign visible to staff  - Offer Toileting in advance of need  - Initiate/Maintain bed alarm  - Obtain necessary fall risk management equipment:   - Apply yellow socks and bracelet for high fall risk patients  - Consider moving patient to room near nurses station  Outcome: Progressing     Problem: DISCHARGE PLANNING  Goal: Discharge to home or other facility with appropriate resources  Description: INTERVENTIONS:  - Identify barriers to discharge w/patient and caregiver  - Arrange for needed discharge resources and transportation as appropriate  - Identify discharge learning needs (meds, wound care, etc.)  - Refer to Case Management Department for coordinating discharge planning if the patient needs post-hospital services based on physician/advanced practitioner order or complex needs related to functional status, cognitive ability, or social support system  Outcome: Progressing     Problem: Knowledge Deficit  Goal: Patient/family/caregiver demonstrates understanding of disease process, treatment plan, medications, and discharge instructions  Description: Complete learning assessment and assess knowledge base.  Interventions:  - Provide teaching at level of understanding  - Provide teaching via preferred learning methods  Outcome: Progressing     Problem: GASTROINTESTINAL - ADULT  Goal: Minimal or absence of nausea and/or vomiting  Description: INTERVENTIONS:  - Administer IV fluids if ordered to ensure adequate hydration  - Maintain NPO status until nausea and vomiting are resolved  - Nasogastric tube if ordered  - Administer ordered antiemetic medications as needed  - Provide nonpharmacologic comfort measures as appropriate  - Advance diet as tolerated, if ordered  - Consider nutrition services referral to assist patient with adequate nutrition and appropriate food choices  Outcome: Progressing  Goal: Maintains  or returns to baseline bowel function  Description: INTERVENTIONS:  - Assess bowel function  - Encourage oral fluids to ensure adequate hydration  - Administer IV fluids if ordered to ensure adequate hydration  - Administer ordered medications as needed  - Encourage mobilization and activity  - Consider nutritional services referral to assist patient with adequate nutrition and appropriate food choices  Outcome: Progressing  Goal: Maintains adequate nutritional intake  Description: INTERVENTIONS:  - Monitor percentage of each meal consumed  - Identify factors contributing to decreased intake, treat as appropriate  - Assist with meals as needed  - Monitor I&O, weight, and lab values if indicated  - Obtain nutrition services referral as needed  Outcome: Progressing

## 2025-07-17 NOTE — PLAN OF CARE
Problem: PAIN - ADULT  Goal: Verbalizes/displays adequate comfort level or baseline comfort level  Description: Interventions:  - Encourage patient to monitor pain and request assistance  - Assess pain using appropriate pain scale  - Administer analgesics as ordered based on type and severity of pain and evaluate response  - Implement non-pharmacological measures as appropriate and evaluate response  - Consider cultural and social influences on pain and pain management  - Notify physician/advanced practitioner if interventions unsuccessful or patient reports new pain  - Educate patient/family on pain management process including their role and importance of  reporting pain   - Provide non-pharmacologic/complimentary pain relief interventions  Outcome: Progressing     Problem: INFECTION - ADULT  Goal: Absence or prevention of progression during hospitalization  Description: INTERVENTIONS:  - Assess and monitor for signs and symptoms of infection  - Monitor lab/diagnostic results  - Monitor all insertion sites, i.e. indwelling lines, tubes, and drains  - Monitor endotracheal if appropriate and nasal secretions for changes in amount and color  - [place] appropriate cooling/warming therapies per order  - Administer medications as ordered  - Instruct and encourage patient and family to use good hand hygiene technique  - Identify and instruct in appropriate isolation precautions for identified infection/condition  Outcome: Progressing     Problem: SAFETY ADULT  Goal: Patient will remain free of falls  Description: INTERVENTIONS:  - Educate patient/family on patient safety including physical limitations  - Instruct patient to call for assistance with activity   - Consider consulting OT/PT to assist with strengthening/mobility based on AM PAC & JH-HLM score  - Consult OT/PT to assist with strengthening/mobility   - Keep Call bell within reach  - Keep bed low and locked with side rails adjusted as appropriate  - Keep  care items and personal belongings within reach  - Initiate and maintain comfort rounds  - Make Fall Risk Sign visible to staff  - Offer Toileting in advance of need  - Initiate/Maintain bed alarm  - Obtain necessary fall risk management equipment:   - Apply yellow socks and bracelet for high fall risk patients  - Consider moving patient to room near nurses station  Outcome: Progressing     Problem: DISCHARGE PLANNING  Goal: Discharge to home or other facility with appropriate resources  Description: INTERVENTIONS:  - Identify barriers to discharge w/patient and caregiver  - Arrange for needed discharge resources and transportation as appropriate  - Identify discharge learning needs (meds, wound care, etc.)  - Refer to Case Management Department for coordinating discharge planning if the patient needs post-hospital services based on physician/advanced practitioner order or complex needs related to functional status, cognitive ability, or social support system  Outcome: Progressing     Problem: GASTROINTESTINAL - ADULT  Goal: Minimal or absence of nausea and/or vomiting  Description: INTERVENTIONS:  - Administer IV fluids if ordered to ensure adequate hydration  - Maintain NPO status until nausea and vomiting are resolved  - Nasogastric tube if ordered  - Administer ordered antiemetic medications as needed  - Provide nonpharmacologic comfort measures as appropriate  - Advance diet as tolerated, if ordered  - Consider nutrition services referral to assist patient with adequate nutrition and appropriate food choices  Outcome: Progressing

## 2025-07-17 NOTE — ASSESSMENT & PLAN NOTE
"49-year-old male with PMH of hypertension, hyperlipidemia, remote history of testicular cancer status post lymph node dissection, orchiectomy, and radiation therapy presenting with abdominal pain x 3 days.  Found to have acute diverticulitis of descending colon w perforation      Tmax 102.5 yesterday evening, remainder VSS on room air  Leukocytosis, WBC 13.71 (13.8)  Hemoglobin stable at 10.8  Magnesium 1.6  Clinically stable, abdomen soft, generalized TTP     7/16 CT CAP \"Acute perforated descending colonic diverticulitis. No abscess.\"      Past surgical history significant for testicular cancer requiring lymph node dissection and orchiectomy over 10 years ago, followed by reoperation 2 years after after initial procedure to \"remove scar tissue \". History also significant for open left inguinal hernia repair.    Plan:  Continue conservative management of acute diverticulitis.   NPO with ice chips  Continue IV antibiotics   Replete magnesium  IV fluids   Follow up blood cultures  Trend AM labs   Analgesia and antiemetics prn   Discussed with Dr. Edmondson   "

## 2025-07-17 NOTE — PROGRESS NOTES
"Progress Note - Surgery-General   Name: Jean Pierre Amos 49 y.o. male I MRN: [id number]  Unit/Bed#: -01 I Date of Admission: 7/16/2025   Date of Service: 7/17/2025 I Hospital Day: 1    Assessment & Plan  Diverticulitis of colon with perforation  49-year-old male with PMH of hypertension, hyperlipidemia, remote history of testicular cancer status post lymph node dissection, orchiectomy, and radiation therapy presenting with abdominal pain x 3 days.  Found to have acute diverticulitis of descending colon w perforation      Tmax 102.5 yesterday evening, remainder VSS on room air  Leukocytosis, WBC 13.71 (13.8)  Hemoglobin stable at 10.8  Magnesium 1.6  Clinically stable, abdomen soft, generalized TTP     7/16 CT CAP \"Acute perforated descending colonic diverticulitis. No abscess.\"      Past surgical history significant for testicular cancer requiring lymph node dissection and orchiectomy over 10 years ago, followed by reoperation 2 years after after initial procedure to \"remove scar tissue \". History also significant for open left inguinal hernia repair.    Plan:  Continue conservative management of acute diverticulitis.   NPO with ice chips  Continue IV antibiotics   Replete magnesium  IV fluids   Follow up blood cultures  Trend AM labs   Analgesia and antiemetics prn   Discussed with Dr. Edmondson   Essential hypertension  Stable    Subjective   Patient reports overall feeling better, less abdominal pain and nausea. No further vomiting. Urinating frequently. No flatus or BM. Fever last night of 102.5    Objective :  Temp:  [98.3 °F (36.8 °C)-102.5 °F (39.2 °C)] 98.4 °F (36.9 °C)  HR:  [65-89] 65  BP: (105-131)/(57-71) 114/66  Resp:  [18-20] 18  SpO2:  [95 %-99 %] 99 %  O2 Device: None (Room air)    I/O         07/15 0701 07/16 0700 07/16 0701 07/17 0700 07/17 0701 07/18 0700    I.V. (mL/kg)  2193.8 (15.7)     IV Piggyback  1500     Total Intake(mL/kg)  3693.8 (26.4)     Urine (mL/kg/hr)  850 325 (1.3)    Total " Output  850 325    Net  +2843.8 -325           Unmeasured Urine Occurrence  2 x             Physical Exam  Vitals reviewed.   Constitutional:       General: He is not in acute distress.     Appearance: He is obese. He is not ill-appearing.   HENT:      Head: Normocephalic and atraumatic.     Cardiovascular:      Rate and Rhythm: Normal rate.   Pulmonary:      Effort: Pulmonary effort is normal. No respiratory distress.   Abdominal:      General: There is no distension.      Palpations: Abdomen is soft.      Tenderness: There is abdominal tenderness. There is no guarding.     Musculoskeletal:      Right lower leg: No edema.      Left lower leg: No edema.     Skin:     General: Skin is warm and dry.     Neurological:      General: No focal deficit present.      Mental Status: He is alert. Mental status is at baseline.     Psychiatric:         Mood and Affect: Mood normal.         Behavior: Behavior normal.         Lab Results: I have reviewed the following results:  Recent Labs     07/16/25  1122 07/16/25  1125 07/16/25  1413 07/17/25  0448   WBC 13.85*  --   --  13.71*   HGB 12.0  --   --  10.8*   HCT 36.9  --   --  33.0*     --   --  191   SODIUM 134*  --   --  134*   K 4.0  --   --  3.7   CL 98  --   --  101   CO2 29  --   --  26   BUN 17  --   --  13   CREATININE 0.97  --   --  0.88   GLUC 99  --   --  109   MG  --   --   --  1.6*   PHOS  --   --   --  3.3   AST 16  --   --   --    ALT 21  --   --   --    ALB 4.2  --   --   --    TBILI 0.71  --   --   --    ALKPHOS 46  --   --   --    HSTNI0  --  <2  --   --    HSTNI2  --   --  <2  --    LACTICACID  --   --  0.7  --        Imaging Results Review: I reviewed radiology reports from this admission including: CT abdomen/pelvis.  Other Study Results Review: No additional pertinent studies reviewed.    VTE Pharmacologic Prophylaxis: VTE covered by:  heparin (porcine), Subcutaneous, 5,000 Units at 07/17/25 0511     VTE Mechanical Prophylaxis: sequential  compression device    Lily Bertrand PA-C

## 2025-07-18 PROBLEM — A41.9 SEPSIS DUE TO UNDETERMINED ORGANISM (HCC): Status: ACTIVE | Noted: 2025-07-18

## 2025-07-18 LAB
ANION GAP SERPL CALCULATED.3IONS-SCNC: 8 MMOL/L (ref 4–13)
BASOPHILS # BLD AUTO: 0.02 THOUSANDS/ÂΜL (ref 0–0.1)
BASOPHILS NFR BLD AUTO: 0 % (ref 0–1)
BUN SERPL-MCNC: 11 MG/DL (ref 5–25)
CALCIUM SERPL-MCNC: 8.2 MG/DL (ref 8.4–10.2)
CHLORIDE SERPL-SCNC: 101 MMOL/L (ref 96–108)
CO2 SERPL-SCNC: 26 MMOL/L (ref 21–32)
CREAT SERPL-MCNC: 0.72 MG/DL (ref 0.6–1.3)
EOSINOPHIL # BLD AUTO: 0.22 THOUSAND/ÂΜL (ref 0–0.61)
EOSINOPHIL NFR BLD AUTO: 2 % (ref 0–6)
ERYTHROCYTE [DISTWIDTH] IN BLOOD BY AUTOMATED COUNT: 12 % (ref 11.6–15.1)
GFR SERPL CREATININE-BSD FRML MDRD: 108 ML/MIN/1.73SQ M
GLUCOSE SERPL-MCNC: 93 MG/DL (ref 65–140)
HCT VFR BLD AUTO: 29.6 % (ref 36.5–49.3)
HGB BLD-MCNC: 9.9 G/DL (ref 12–17)
IMM GRANULOCYTES # BLD AUTO: 0.07 THOUSAND/UL (ref 0–0.2)
IMM GRANULOCYTES NFR BLD AUTO: 1 % (ref 0–2)
LYMPHOCYTES # BLD AUTO: 0.94 THOUSANDS/ÂΜL (ref 0.6–4.47)
LYMPHOCYTES NFR BLD AUTO: 10 % (ref 14–44)
MAGNESIUM SERPL-MCNC: 2 MG/DL (ref 1.9–2.7)
MCH RBC QN AUTO: 29.2 PG (ref 26.8–34.3)
MCHC RBC AUTO-ENTMCNC: 33.4 G/DL (ref 31.4–37.4)
MCV RBC AUTO: 87 FL (ref 82–98)
MONOCYTES # BLD AUTO: 0.99 THOUSAND/ÂΜL (ref 0.17–1.22)
MONOCYTES NFR BLD AUTO: 10 % (ref 4–12)
NEUTROPHILS # BLD AUTO: 7.56 THOUSANDS/ÂΜL (ref 1.85–7.62)
NEUTS SEG NFR BLD AUTO: 77 % (ref 43–75)
NRBC BLD AUTO-RTO: 0 /100 WBCS
PLATELET # BLD AUTO: 168 THOUSANDS/UL (ref 149–390)
PMV BLD AUTO: 9.6 FL (ref 8.9–12.7)
POTASSIUM SERPL-SCNC: 3.5 MMOL/L (ref 3.5–5.3)
RBC # BLD AUTO: 3.39 MILLION/UL (ref 3.88–5.62)
SODIUM SERPL-SCNC: 135 MMOL/L (ref 135–147)
WBC # BLD AUTO: 9.8 THOUSAND/UL (ref 4.31–10.16)

## 2025-07-18 PROCEDURE — 83735 ASSAY OF MAGNESIUM: CPT

## 2025-07-18 PROCEDURE — 81001 URINALYSIS AUTO W/SCOPE: CPT | Performed by: INTERNAL MEDICINE

## 2025-07-18 PROCEDURE — 81003 URINALYSIS AUTO W/O SCOPE: CPT | Performed by: INTERNAL MEDICINE

## 2025-07-18 PROCEDURE — 99232 SBSQ HOSP IP/OBS MODERATE 35: CPT | Performed by: SURGERY

## 2025-07-18 PROCEDURE — 80048 BASIC METABOLIC PNL TOTAL CA: CPT

## 2025-07-18 PROCEDURE — 85025 COMPLETE CBC W/AUTO DIFF WBC: CPT

## 2025-07-18 RX ORDER — OXYCODONE HYDROCHLORIDE 10 MG/1
10 TABLET ORAL EVERY 4 HOURS PRN
Refills: 0 | Status: DISCONTINUED | OUTPATIENT
Start: 2025-07-18 | End: 2025-07-19 | Stop reason: HOSPADM

## 2025-07-18 RX ORDER — OXYCODONE HYDROCHLORIDE 5 MG/1
5 TABLET ORAL EVERY 4 HOURS PRN
Refills: 0 | Status: DISCONTINUED | OUTPATIENT
Start: 2025-07-18 | End: 2025-07-19 | Stop reason: HOSPADM

## 2025-07-18 RX ADMIN — SODIUM CHLORIDE, SODIUM LACTATE, POTASSIUM CHLORIDE, AND CALCIUM CHLORIDE 125 ML/HR: .6; .31; .03; .02 INJECTION, SOLUTION INTRAVENOUS at 06:06

## 2025-07-18 RX ADMIN — ACETAMINOPHEN 1000 MG: 10 INJECTION INTRAVENOUS at 03:27

## 2025-07-18 RX ADMIN — OXYCODONE HYDROCHLORIDE 10 MG: 10 TABLET ORAL at 18:45

## 2025-07-18 RX ADMIN — ZOLPIDEM TARTRATE 5 MG: 5 TABLET, FILM COATED ORAL at 21:42

## 2025-07-18 RX ADMIN — PIPERACILLIN AND TAZOBACTAM 4.5 G: 4; .5 INJECTION, POWDER, FOR SOLUTION INTRAVENOUS at 08:54

## 2025-07-18 RX ADMIN — HYDROMORPHONE HYDROCHLORIDE 0.2 MG: 0.2 INJECTION, SOLUTION INTRAMUSCULAR; INTRAVENOUS; SUBCUTANEOUS at 13:09

## 2025-07-18 RX ADMIN — HEPARIN SODIUM 5000 UNITS: 5000 INJECTION INTRAVENOUS; SUBCUTANEOUS at 21:13

## 2025-07-18 RX ADMIN — PIPERACILLIN AND TAZOBACTAM 4.5 G: 4; .5 INJECTION, POWDER, FOR SOLUTION INTRAVENOUS at 23:50

## 2025-07-18 RX ADMIN — HEPARIN SODIUM 5000 UNITS: 5000 INJECTION INTRAVENOUS; SUBCUTANEOUS at 06:01

## 2025-07-18 RX ADMIN — SODIUM CHLORIDE, SODIUM LACTATE, POTASSIUM CHLORIDE, AND CALCIUM CHLORIDE 125 ML/HR: .6; .31; .03; .02 INJECTION, SOLUTION INTRAVENOUS at 23:48

## 2025-07-18 RX ADMIN — PIPERACILLIN AND TAZOBACTAM 4.5 G: 4; .5 INJECTION, POWDER, FOR SOLUTION INTRAVENOUS at 16:34

## 2025-07-18 RX ADMIN — PIPERACILLIN AND TAZOBACTAM 4.5 G: 4; .5 INJECTION, POWDER, FOR SOLUTION INTRAVENOUS at 00:11

## 2025-07-18 NOTE — ASSESSMENT & PLAN NOTE
"49-year-old male with PMH of hypertension, hyperlipidemia, remote history of testicular cancer status post lymph node dissection, orchiectomy, and radiation therapy presenting with abdominal pain x 3 days.    HD#2 for conservative management of acute diverticulitis of descending colon w perforation      Afebrile, vital signs stable  Leukocytosis, WBC 13.71 (13.8)  Hemoglobin stable at 9.9 from 10.8  Clinically stable, abdomen soft, nondistended, mild TTP to LLQ     7/16 CT CAP \"Acute perforated descending colonic diverticulitis. No abscess.\"      Past surgical history significant for testicular cancer requiring lymph node dissection and orchiectomy over 10 years ago, followed by reoperation 2 years after after initial procedure to \"remove scar tissue \". History also significant for open left inguinal hernia repair.    Plan:  Continue conservative management of acute diverticulitis.   Advance to clear liquids  Continue IV antibiotics   Check electrolytes, replete as needed  IV fluids   Follow up blood cultures  Trend AM labs   Analgesia and antiemetics prn   DVT prophylaxis, subq heparin  Out of bed ambulation  Discussed with Dr. Edmondson   " Goal Outcome Evaluation:       Afebrile. Retana catheter draining clear yellow urine. Sleeping most of this shift, disoriented to place, time and situation. Repositioning every 2 hours.

## 2025-07-18 NOTE — NUTRITION
07/18/25 1236   Biochemical Data,Medical Tests, and Procedures   Biochemical Data/Medical Tests/Procedures Lab values reviewed;Meds reviewed   Labs (Comment) 7/18/2025 calcium 8.2   Meds (Comment) Heparin, lactated Ringer's infusion, Dilaudid, Phenergan, Zofran   Nutrition-Focused Physical Exam   Nutrition-Focused Physical Exam Findings RN skin assessment reviewed;No edema documented;No skin issues documented   Medical-Related Concerns hypertension, mixed hyperlipidemia   Adequacy of Intake   Nutrition Modality PO   Feeding Route   PO Independent   Current PO Intake   Current Diet Order Clear liquid diet   Current Meal Intake Inadequate   Estimated calorie intake compared to estimated need Nutrient needs are not met at present   PES Statement   Problem Intake   Energy Balance (1) Inadequate energy intake NI-1.2   Related to Other (Comment)  (Acute illness (diverticulitis of colon with perforation))   As evidenced by: NPO/CL > 3 days   Recommendations/Interventions   Malnutrition/BMI Present No   Summary N.p.o./clear liquids.  Presents with abdominal pain and chest pain.  Found to have diverticulitis of colon with perforation.  Responding favorably to nonoperative treatment per general surgery notes.  Past medical history significant for hypertension, mixed hyperlipidemia.  Weight history reviewed.  No significant changes.  No edema.  No pressure areas.  Prescribed clear liquid diet.  Patient reports having a decreased appetite.  Tolerating clear liquids at present.  Usually has 2 meals daily.  Breakfast-fruit and cereal.  Lunch-sandwich.  Dinner-often skips in the summer.  Does not consume alcohol or soda.  Follows a heart healthy diet, low in sodium and fat.  NKFA.  Denies dysphagia.  Reports increase in stress.  Reports some weight loss, however normal for summer months.  RD discussed diet as prescribed.  Discussed likely advancement to low fiber/low residue.  Patient reports familiarity with low fiber diet.   Encouraged slow reintroduction of fiber along with adequate fluids over time.  He verbalizes understanding and offers no additional questions.  RD to follow.   Interventions/Recommendations Monitor I & O's;Diet to advance   Education Assessment   Education Patient declined nutrition education   Patient Nutrition Goals   Goal Tolerate PO diet;Meet PO needs   Goal Status Initiated   Timeframe to complete goal by next f/u   Nutrition Complexity Risk   Nutrition complexity level Low risk   Follow up date 07/25/25

## 2025-07-18 NOTE — PLAN OF CARE
Problem: PAIN - ADULT  Goal: Verbalizes/displays adequate comfort level or baseline comfort level  Description: Interventions:  - Encourage patient to monitor pain and request assistance  - Assess pain using appropriate pain scale  - Administer analgesics as ordered based on type and severity of pain and evaluate response  - Implement non-pharmacological measures as appropriate and evaluate response  - Consider cultural and social influences on pain and pain management  - Notify physician/advanced practitioner if interventions unsuccessful or patient reports new pain  - Educate patient/family on pain management process including their role and importance of  reporting pain   - Provide non-pharmacologic/complimentary pain relief interventions  Outcome: Progressing     Problem: INFECTION - ADULT  Goal: Absence or prevention of progression during hospitalization  Description: INTERVENTIONS:  - Assess and monitor for signs and symptoms of infection  - Monitor lab/diagnostic results  - Monitor all insertion sites, i.e. indwelling lines, tubes, and drains  - Monitor endotracheal if appropriate and nasal secretions for changes in amount and color  - [place] appropriate cooling/warming therapies per order  - Administer medications as ordered  - Instruct and encourage patient and family to use good hand hygiene technique  - Identify and instruct in appropriate isolation precautions for identified infection/condition  Outcome: Progressing     Problem: SAFETY ADULT  Goal: Patient will remain free of falls  Description: INTERVENTIONS:  - Educate patient/family on patient safety including physical limitations  - Instruct patient to call for assistance with activity   - Consider consulting OT/PT to assist with strengthening/mobility based on AM PAC & JH-HLM score  - Consult OT/PT to assist with strengthening/mobility   - Keep Call bell within reach  - Keep bed low and locked with side rails adjusted as appropriate  - Keep  care items and personal belongings within reach  - Initiate and maintain comfort rounds  - Make Fall Risk Sign visible to staff  - Offer Toileting in advance of need  - Initiate/Maintain bed alarm  - Obtain necessary fall risk management equipment:   - Apply yellow socks and bracelet for high fall risk patients  - Consider moving patient to room near nurses station  Outcome: Progressing     Problem: DISCHARGE PLANNING  Goal: Discharge to home or other facility with appropriate resources  Description: INTERVENTIONS:  - Identify barriers to discharge w/patient and caregiver  - Arrange for needed discharge resources and transportation as appropriate  - Identify discharge learning needs (meds, wound care, etc.)  - Refer to Case Management Department for coordinating discharge planning if the patient needs post-hospital services based on physician/advanced practitioner order or complex needs related to functional status, cognitive ability, or social support system  Outcome: Progressing     Problem: Knowledge Deficit  Goal: Patient/family/caregiver demonstrates understanding of disease process, treatment plan, medications, and discharge instructions  Description: Complete learning assessment and assess knowledge base.  Interventions:  - Provide teaching at level of understanding  - Provide teaching via preferred learning methods  Outcome: Progressing     Problem: GASTROINTESTINAL - ADULT  Goal: Minimal or absence of nausea and/or vomiting  Description: INTERVENTIONS:  - Administer IV fluids if ordered to ensure adequate hydration  - Maintain NPO status until nausea and vomiting are resolved  - Nasogastric tube if ordered  - Administer ordered antiemetic medications as needed  - Provide nonpharmacologic comfort measures as appropriate  - Advance diet as tolerated, if ordered  - Consider nutrition services referral to assist patient with adequate nutrition and appropriate food choices  Outcome: Progressing  Goal: Maintains  or returns to baseline bowel function  Description: INTERVENTIONS:  - Assess bowel function  - Encourage oral fluids to ensure adequate hydration  - Administer IV fluids if ordered to ensure adequate hydration  - Administer ordered medications as needed  - Encourage mobilization and activity  - Consider nutritional services referral to assist patient with adequate nutrition and appropriate food choices  Outcome: Progressing  Goal: Maintains adequate nutritional intake  Description: INTERVENTIONS:  - Monitor percentage of each meal consumed  - Identify factors contributing to decreased intake, treat as appropriate  - Assist with meals as needed  - Monitor I&O, weight, and lab values if indicated  - Obtain nutrition services referral as needed  Outcome: Progressing

## 2025-07-18 NOTE — CASE MANAGEMENT
Case Management Discharge Planning Note    Patient name Jean Pierre Amos  Location /-01 MRN [id number]  : 1975 Date 2025       Current Admission Date: 2025  Current Admission Diagnosis:Diverticulitis of colon with perforation   Patient Active Problem List    Diagnosis Date Noted    Diverticulitis of colon with perforation 2025    Chest pain 2022    Thoracic aortic aneurysm (HCC) 2022    Olecranon bursitis of left elbow 2020    Essential hypertension 2020    Mixed hyperlipidemia 2020      LOS (days): 2  Geometric Mean LOS (GMLOS) (days):   Days to GMLOS:     OBJECTIVE:  Risk of Unplanned Readmission Score: 11.57         Current admission status: Inpatient   Preferred Pharmacy:   [id number] [address]  [address] [place] [id number]  Phone: [phone] Fax: [phone]    Primary Care Provider: No primary care provider on file.    Primary Insurance: [facility] BLUE SHIELD  Secondary Insurance:     DISCHARGE DETAILS:    Additional Comments: Chart reviewed for discharge planning. Pt completely independent with ADLs prior to admission. Pt has no idenitfied CM discharge needs at this time. Pt will return home with family on discharge. CM to follow as needed.

## 2025-07-18 NOTE — PROGRESS NOTES
"Progress Note - Surgery-General   Name: Jean Pierre Amos 49 y.o. male I MRN: [id number]  Unit/Bed#: -01 I Date of Admission: 7/16/2025   Date of Service: 7/18/2025 I Hospital Day: 2    Assessment & Plan  Diverticulitis of colon with perforation  49-year-old male with PMH of hypertension, hyperlipidemia, remote history of testicular cancer status post lymph node dissection, orchiectomy, and radiation therapy presenting with abdominal pain x 3 days.    HD#2 for conservative management of acute diverticulitis of descending colon w perforation      Afebrile, vital signs stable  Leukocytosis, WBC 13.71 (13.8)  Hemoglobin stable at 9.9 from 10.8  Clinically stable, abdomen soft, nondistended, mild TTP to LLQ     7/16 CT CAP \"Acute perforated descending colonic diverticulitis. No abscess.\"      Past surgical history significant for testicular cancer requiring lymph node dissection and orchiectomy over 10 years ago, followed by reoperation 2 years after after initial procedure to \"remove scar tissue \". History also significant for open left inguinal hernia repair.    Plan:  Continue conservative management of acute diverticulitis.   Advance to clear liquids  Continue IV antibiotics   Check electrolytes, replete as needed  IV fluids   Follow up blood cultures  Trend AM labs   Analgesia and antiemetics prn   DVT prophylaxis, subq heparin  Out of bed ambulation  Discussed with Dr. Edmondson   Essential hypertension  Stable  Start oral home antihypertensives once tolerating oral diet    Subjective   Patient feeling better today overall compared to last 2 days.  No nausea vomiting.  Has not had anything to eat or drink.  Voiding without issue.  Bowel movement a few days ago.  Passing flatus.  Bed ambulating.    Objective :  Temp:  [97.4 °F (36.3 °C)-98.2 °F (36.8 °C)] 98.2 °F (36.8 °C)  HR:  [71-83] 72  BP: (125-138)/(65-83) 138/83  Resp:  [16-18] 16  SpO2:  [95 %-98 %] 95 %  O2 Device: None (Room air)    I/O         07/16 " Sometimes managing your health at home requires assistance.  [facility] team has recognized your preference to use [facility].  They can be reached by phone at [phone].  The fax number for your reference is [phone].  A representative from the home health agency will contact you or your family to schedule your first visit.     0701 07/17 0700 07/17 0701 07/18 0700 07/18 0701 07/19 0700    I.V. (mL/kg) 2193.8 (15.7) 2000 (14.3)     IV Piggyback 1500 500     Total Intake(mL/kg) 3693.8 (26.4) 2500 (17.9)     Urine (mL/kg/hr) 850 1600 (0.5)     Total Output 850 1600     Net +2843.8 +900            Unmeasured Urine Occurrence 2 x              Physical Exam  Vitals reviewed.   Constitutional:       General: He is not in acute distress.     Appearance: He is not toxic-appearing.   HENT:      Head: Normocephalic and atraumatic.     Cardiovascular:      Rate and Rhythm: Normal rate.   Pulmonary:      Effort: Pulmonary effort is normal.   Abdominal:      General: There is no distension.      Palpations: Abdomen is soft.      Tenderness: There is abdominal tenderness (Mild with deep palpation to the left lower quadrant towards the back). There is no guarding or rebound.     Neurological:      Mental Status: He is alert.           Lab Results: I have reviewed the following results:  Recent Labs     07/16/25  1122 07/16/25  1122 07/16/25  1125 07/16/25  1413 07/17/25  0448 07/18/25  0509   WBC 13.85*  --   --   --  13.71* 9.80   HGB 12.0  --   --   --  10.8* 9.9*   HCT 36.9  --   --   --  33.0* 29.6*     --   --   --  191 168   SODIUM 134*  --   --   --  134* 135   K 4.0  --   --   --  3.7 3.5   CL 98  --   --   --  101 101   CO2 29  --   --   --  26 26   BUN 17  --   --   --  13 11   CREATININE 0.97  --   --   --  0.88 0.72   GLUC 99  --   --   --  109 93   MG  --    < >  --   --  1.6* 2.0   PHOS  --   --   --   --  3.3  --    AST 16  --   --   --   --   --    ALT 21  --   --   --   --   --    ALB 4.2  --   --   --   --   --    TBILI 0.71  --   --   --   --   --    ALKPHOS 46  --   --   --   --   --    HSTNI0  --   --  <2  --   --   --    HSTNI2  --   --   --  <2  --   --    LACTICACID  --   --   --  0.7  --   --     < > = values in this interval not displayed.     VTE Pharmacologic Prophylaxis: VTE covered by:  heparin (porcine),  Subcutaneous, 5,000 Units at 07/18/25 0601     VTE Mechanical Prophylaxis: sequential compression device

## 2025-07-18 NOTE — PLAN OF CARE
Problem: PAIN - ADULT  Goal: Verbalizes/displays adequate comfort level or baseline comfort level  Description: Interventions:  - Encourage patient to monitor pain and request assistance  - Assess pain using appropriate pain scale  - Administer analgesics as ordered based on type and severity of pain and evaluate response  - Implement non-pharmacological measures as appropriate and evaluate response  - Consider cultural and social influences on pain and pain management  - Notify physician/advanced practitioner if interventions unsuccessful or patient reports new pain  - Educate patient/family on pain management process including their role and importance of  reporting pain   - Provide non-pharmacologic/complimentary pain relief interventions  Outcome: Progressing   Pt reports soreness

## 2025-07-19 VITALS
HEART RATE: 57 BPM | OXYGEN SATURATION: 96 % | DIASTOLIC BLOOD PRESSURE: 68 MMHG | WEIGHT: 308 LBS | TEMPERATURE: 98.4 F | RESPIRATION RATE: 16 BRPM | HEIGHT: 78 IN | SYSTOLIC BLOOD PRESSURE: 124 MMHG | BODY MASS INDEX: 35.64 KG/M2

## 2025-07-19 LAB
ANION GAP SERPL CALCULATED.3IONS-SCNC: 6 MMOL/L (ref 4–13)
BACTERIA UR QL AUTO: ABNORMAL /HPF
BASOPHILS # BLD AUTO: 0.02 THOUSANDS/ÂΜL (ref 0–0.1)
BASOPHILS NFR BLD AUTO: 0 % (ref 0–1)
BILIRUB UR QL STRIP: ABNORMAL
BUN SERPL-MCNC: 9 MG/DL (ref 5–25)
CALCIUM SERPL-MCNC: 8.6 MG/DL (ref 8.4–10.2)
CHLORIDE SERPL-SCNC: 101 MMOL/L (ref 96–108)
CLARITY UR: CLEAR
CO2 SERPL-SCNC: 29 MMOL/L (ref 21–32)
COLOR UR: YELLOW
CREAT SERPL-MCNC: 0.76 MG/DL (ref 0.6–1.3)
EOSINOPHIL # BLD AUTO: 0.24 THOUSAND/ÂΜL (ref 0–0.61)
EOSINOPHIL NFR BLD AUTO: 3 % (ref 0–6)
ERYTHROCYTE [DISTWIDTH] IN BLOOD BY AUTOMATED COUNT: 11.9 % (ref 11.6–15.1)
GFR SERPL CREATININE-BSD FRML MDRD: 106 ML/MIN/1.73SQ M
GLUCOSE SERPL-MCNC: 98 MG/DL (ref 65–140)
GLUCOSE UR STRIP-MCNC: NEGATIVE MG/DL
HCT VFR BLD AUTO: 33.1 % (ref 36.5–49.3)
HGB BLD-MCNC: 10.9 G/DL (ref 12–17)
HGB UR QL STRIP.AUTO: ABNORMAL
IMM GRANULOCYTES # BLD AUTO: 0.04 THOUSAND/UL (ref 0–0.2)
IMM GRANULOCYTES NFR BLD AUTO: 1 % (ref 0–2)
KETONES UR STRIP-MCNC: ABNORMAL MG/DL
LEUKOCYTE ESTERASE UR QL STRIP: NEGATIVE
LYMPHOCYTES # BLD AUTO: 1.11 THOUSANDS/ÂΜL (ref 0.6–4.47)
LYMPHOCYTES NFR BLD AUTO: 14 % (ref 14–44)
MAGNESIUM SERPL-MCNC: 2 MG/DL (ref 1.9–2.7)
MCH RBC QN AUTO: 29.2 PG (ref 26.8–34.3)
MCHC RBC AUTO-ENTMCNC: 32.9 G/DL (ref 31.4–37.4)
MCV RBC AUTO: 89 FL (ref 82–98)
MONOCYTES # BLD AUTO: 0.76 THOUSAND/ÂΜL (ref 0.17–1.22)
MONOCYTES NFR BLD AUTO: 10 % (ref 4–12)
NEUTROPHILS # BLD AUTO: 5.67 THOUSANDS/ÂΜL (ref 1.85–7.62)
NEUTS SEG NFR BLD AUTO: 72 % (ref 43–75)
NITRITE UR QL STRIP: NEGATIVE
NON-SQ EPI CELLS URNS QL MICRO: ABNORMAL /HPF
NRBC BLD AUTO-RTO: 0 /100 WBCS
PH UR STRIP.AUTO: 6.5 [PH]
PLATELET # BLD AUTO: 209 THOUSANDS/UL (ref 149–390)
PMV BLD AUTO: 10.2 FL (ref 8.9–12.7)
POTASSIUM SERPL-SCNC: 3.6 MMOL/L (ref 3.5–5.3)
PROT UR STRIP-MCNC: NEGATIVE MG/DL
RBC # BLD AUTO: 3.73 MILLION/UL (ref 3.88–5.62)
RBC #/AREA URNS AUTO: ABNORMAL /HPF
SODIUM SERPL-SCNC: 136 MMOL/L (ref 135–147)
SP GR UR STRIP.AUTO: 1.01
UROBILINOGEN UR QL STRIP.AUTO: 0.2 E.U./DL
WBC # BLD AUTO: 7.84 THOUSAND/UL (ref 4.31–10.16)
WBC #/AREA URNS AUTO: ABNORMAL /HPF

## 2025-07-19 PROCEDURE — NC001 PR NO CHARGE: Performed by: PHYSICIAN ASSISTANT

## 2025-07-19 PROCEDURE — 85025 COMPLETE CBC W/AUTO DIFF WBC: CPT

## 2025-07-19 PROCEDURE — 99238 HOSP IP/OBS DSCHRG MGMT 30/<: CPT | Performed by: PHYSICIAN ASSISTANT

## 2025-07-19 PROCEDURE — 83735 ASSAY OF MAGNESIUM: CPT | Performed by: PHYSICIAN ASSISTANT

## 2025-07-19 PROCEDURE — 80048 BASIC METABOLIC PNL TOTAL CA: CPT

## 2025-07-19 RX ORDER — ATORVASTATIN CALCIUM 80 MG/1
80 TABLET, FILM COATED ORAL
Status: DISCONTINUED | OUTPATIENT
Start: 2025-07-19 | End: 2025-07-19 | Stop reason: HOSPADM

## 2025-07-19 RX ORDER — ACETAMINOPHEN 325 MG/1
650 TABLET ORAL EVERY 6 HOURS PRN
Status: DISCONTINUED | OUTPATIENT
Start: 2025-07-19 | End: 2025-07-19 | Stop reason: HOSPADM

## 2025-07-19 RX ORDER — LISINOPRIL 20 MG/1
40 TABLET ORAL DAILY
Status: DISCONTINUED | OUTPATIENT
Start: 2025-07-19 | End: 2025-07-19 | Stop reason: HOSPADM

## 2025-07-19 RX ADMIN — HEPARIN SODIUM 5000 UNITS: 5000 INJECTION INTRAVENOUS; SUBCUTANEOUS at 13:50

## 2025-07-19 RX ADMIN — LISINOPRIL 40 MG: 20 TABLET ORAL at 11:11

## 2025-07-19 RX ADMIN — PIPERACILLIN AND TAZOBACTAM 4.5 G: 4; .5 INJECTION, POWDER, FOR SOLUTION INTRAVENOUS at 09:10

## 2025-07-19 RX ADMIN — HEPARIN SODIUM 5000 UNITS: 5000 INJECTION INTRAVENOUS; SUBCUTANEOUS at 05:09

## 2025-07-19 RX ADMIN — ACETAMINOPHEN 650 MG: 325 TABLET ORAL at 15:08

## 2025-07-19 RX ADMIN — PIPERACILLIN AND TAZOBACTAM 4.5 G: 4; .5 INJECTION, POWDER, FOR SOLUTION INTRAVENOUS at 16:49

## 2025-07-19 RX ADMIN — ATORVASTATIN CALCIUM 80 MG: 80 TABLET, FILM COATED ORAL at 16:49

## 2025-07-19 NOTE — PROGRESS NOTES
"Progress Note - Surgery-General   Name: Jean Pierre Amos 49 y.o. male I MRN: [id number]  Unit/Bed#: -01 I Date of Admission: 7/16/2025   Date of Service: 7/19/2025 I Hospital Day: 3    Assessment & Plan  Diverticulitis of colon with perforation  49-year-old male with PMH of hypertension, hyperlipidemia, remote history of testicular cancer status post lymph node dissection, orchiectomy, and radiation therapy presenting with abdominal pain x 3 days.    HD#3 for conservative management of acute diverticulitis of descending colon w perforation      Afebrile, vital signs stable  Leukocytosis, resolved- WBC 7.84  Hemoglobin stable at 10.9 from 9.9  Clinically stable, abdomen soft, nondistended, no TTP      7/16 CT CAP \"Acute perforated descending colonic diverticulitis. No abscess.\"      Past surgical history significant for testicular cancer requiring lymph node dissection and orchiectomy over 10 years ago, followed by reoperation 2 years after after initial procedure to \"remove scar tissue \". History also significant for open left inguinal hernia repair.    Plan:  Continue conservative management of acute diverticulitis.   Advance to surgical soft diet, likely today if he tolerates diet change  Continue IV antibiotics, transition to po upon DC  DC IV fluids   Follow up blood cultures, no growth in 48 hours  Analgesia and antiemetics prn   DVT prophylaxis, subq heparin  Out of bed ambulation  Low fiber x 2 weeks upon discharge, then transition to maintain high-fiber diet  Over-the-counter ibuprofen/Tylenol as needed for pain control upon discharge  Outpatient follow-up as needed with general surgery  Outpatient follow-up with GI plan for colonoscopy in 6 to 8 weeks  Discussed with Dr. Busch   Essential hypertension  Stable  Restart oral home antihypertensives      Subjective   Jean Pierre is a 49 year old male with a PMH of hypertension, hyperlipidemia, remote history of testicular cancer status post lymph node " dissection, orchiectomy, and radiation therapy. He is HD#3 for conservative management of acute diverticulitis of descending colon with perforation. Patient states he has no issues today. He denies chest pain, shortness of breath, abdominal pain, nausea, vomiting, and leg pain. He would like to advance his diet today. Patient has been ambulating and using the bathroom without issues. Patient is interested in going home.     Objective :  Temp:  [97.9 °F (36.6 °C)-98.9 °F (37.2 °C)] 98.9 °F (37.2 °C)  HR:  [74-79] 74  BP: (124-148)/(74-80) 133/75  Resp:  [16-19] 16  SpO2:  [94 %-96 %] 96 %  O2 Device: None (Room air)    I/O         07/17 0701 07/18 0700 07/18 0701 07/19 0700 07/19 0701 07/20 0700    P.O.  340 0    I.V. (mL/kg) 2000 (14.3) 2050 (14.6)     IV Piggyback 500 200     Total Intake(mL/kg) 2500 (17.9) 2590 (18.5) 0 (0)    Urine (mL/kg/hr) 1600 (0.5) 325 (0.1)     Total Output 1600 325     Net +900 +2265 0                   Physical Exam  Constitutional:       Appearance: Normal appearance.     Cardiovascular:      Rate and Rhythm: Normal rate and regular rhythm.      Heart sounds: No murmur heard.     No friction rub. No gallop.   Pulmonary:      Effort: Pulmonary effort is normal.      Breath sounds: Normal breath sounds. No wheezing, rhonchi or rales.   Abdominal:      General: Abdomen is flat. There is no distension.      Palpations: Abdomen is soft.      Tenderness: There is no abdominal tenderness. There is no guarding.     Skin:     General: Skin is warm and dry.     Neurological:      General: No focal deficit present.      Mental Status: He is alert.     Psychiatric:         Mood and Affect: Mood normal.         Behavior: Behavior normal.           Lab Results: I have reviewed the following results:  Recent Labs     07/16/25  1413 07/17/25  0448 07/18/25  0509 07/19/25  0513   WBC  --  13.71*   < > 7.84   HGB  --  10.8*   < > 10.9*   HCT  --  33.0*   < > 33.1*   PLT  --  191   < > 209   SODIUM  --   134*   < > 136   K  --  3.7   < > 3.6   CL  --  101   < > 101   CO2  --  26   < > 29   BUN  --  13   < > 9   CREATININE  --  0.88   < > 0.76   GLUC  --  109   < > 98   MG  --  1.6*   < > 2.0   PHOS  --  3.3  --   --    HSTNI2 <2  --   --   --    LACTICACID 0.7  --   --   --     < > = values in this interval not displayed.       VTE Pharmacologic Prophylaxis: Heparin  VTE Mechanical Prophylaxis: sequential compression device

## 2025-07-19 NOTE — DISCHARGE INSTR - AVS FIRST PAGE
Recommend you follow up with your PCP, if you do not have a PCP recommend you establish one,    Your antibiotic was sent to your pharmacy. Take full course.     DIVERTICULITIS EDUCATION:    After you are discharged from the hospital:  Please stick to a low fiber/low residue diet for the next 2 weeks as you recover. This means avoiding whole grains, raw vegetables, and legumes/beans. You will then transition and maintain a high-fiber/residue diet for prevention of further progression of diverticulosis formation/new diverticulitis episodes. **See detailed diet information below.**  You can continue to take over the counter pain medication as needed. NSAIDs (ibuprofen, Advil, Motrin) work best as they help with inflammation and this is an inflammatory disease. You can take 600 mg ibuprofen every 6 hours. Take with food. Do not take if your doctor has instructed you not and if you have a history of any of the following: H.Pylori, stomach/duodenal ulcers, kidney disease, heart failure, cirrhotic liver disease, or if you are already taking other anticoagulants (blood thinners). Do not take this medication long term; this should only be for a few days until your symptoms have resolved. If your pain persists, returns, or worsens please go to your PCP, urgent care, or ER to be re-evaluated.   Take the full course of your antibiotic. Take with food.  Follow up with a gastroenterologist or general surgery for a colonoscopy if you have not had one within the past year. This should be done 6-8 weeks after your acute episode of diverticulitis once the inflammation has resolved.   Follow up with your primary care provider in the next 1-2 weeks to discuss your hospitalization and see how you are doing if you had any additional problems while in the hospital.  Return to the ED if you begin to develop: fever/chills >100.4, severe worsening abdominal pain, N/V, signs of dehydration, blood in the stool, not passing stool or gas, or any  other significant changes to your overall health.     Diverticulitis prevention: Overall, live a healthy lifestyle.   Exercise regularly, stay active--exercise promotes bowel function/regular bowel movements  Weight loss--Being obese or overweight is a risk factor for diverticulitis   Eat a diet rich in high-fiber foods (green leafy vegetables/fruits/whole grains/beans) or take a fiber supplement as needed (such as Psyllium/Metamucil)--Fiber helps to soften/bulk up the stool to  make bowel movements regular and to help to prevent straining/constipation which contribute to development of more diverticula  It is okay to eat nuts/seeds/corn, this is a medical myth and studies show that consuming these foods is NOT associated with causing diverticulitis  Drink plenty of fluids (mostly water) (as able--do not over-consume if on fluid restriction for heart/kidneys)--Fiber works by absorbing water so it is important to be well hydrated especially if you are taking a fiber supplement   Smoking cessation (cigarettes and vape)--research studies have shown smoking leads to an increased risk of acute episodes/flare ups and an increased risk of development of complications  Avoid or abstain from alcohol--heavy alcohol use is a risk factor for diverticulitis     Low fiber/Low residue Diet:  Why do I need to eat this diet? This diet reduces the size and frequency of stool, allowing for less pressure against the bowels, giving them a chance to rest.  Fiber adds bulk to your stool, makes you feel full, and promotes regular bowel movements. This is appropriate and actually preferred as part of a nutritious diet, but after certain surgeries/medical issues and in the acute phase of inflammation of your colon, it is better for you to eat low-fiber/low-residue for a few weeks.   After bowel surgery or inflammatory conditions affecting the bowels, this diet is recommended to allow your intestines for some time to heal.     What is  "Diverticulitis vs Diverticulosis?  DiverticuLOSIS = the pathology of having diverticula form along different segments of the colon/large intestine. Diverticula are small sac-like protrusions or outpouchings of the intestine at weakened area of the muscular wall of the colon. These are typically asymptomatic and you may not know you have them until seen incidentally on a colonoscopy or if you develop symptoms and get diverticulitis.   DiverticuLITIS = inflammation/infection of the diverticula. This is a \"flare up\" or acute episode that needs to be treated with antibiotics or more if there are complications. Symptoms include: left lower abdominal pain, fever, nausea/vomiting.    What are complications related to diverticulitis?  The following are complications related to the pathology of diverticulitis and should be treated in the hospital.  Rectal bleeding/blood in the stool  Microperforation (contained perforation a small amount of air bubbles outside of the colon)  Abscess (infected fluid collection inside the abdomen)  Obstruction (blockage of the colon)  Fistula (abnormal connection of one organ to another usually from chronic inflammation)   Paramjit perforation (full open hole of the colon with contents going into the abdominal cavity)- life threatening emergency, requires acute surgical intervention          "

## 2025-07-19 NOTE — NURSING NOTE
Went over discharge info with patient and with patients wife. All questions answered. PCA took patient down to main lobby via wheel chair.

## 2025-07-19 NOTE — PLAN OF CARE
Problem: PAIN - ADULT  Goal: Verbalizes/displays adequate comfort level or baseline comfort level  Description: Interventions:  - Encourage patient to monitor pain and request assistance  - Assess pain using appropriate pain scale  - Administer analgesics as ordered based on type and severity of pain and evaluate response  - Implement non-pharmacological measures as appropriate and evaluate response  - Consider cultural and social influences on pain and pain management  - Notify physician/advanced practitioner if interventions unsuccessful or patient reports new pain  - Educate patient/family on pain management process including their role and importance of  reporting pain   - Provide non-pharmacologic/complimentary pain relief interventions  Outcome: Progressing     Problem: INFECTION - ADULT  Goal: Absence or prevention of progression during hospitalization  Description: INTERVENTIONS:  - Assess and monitor for signs and symptoms of infection  - Monitor lab/diagnostic results  - Monitor all insertion sites, i.e. indwelling lines, tubes, and drains  - Monitor endotracheal if appropriate and nasal secretions for changes in amount and color  - [place] appropriate cooling/warming therapies per order  - Administer medications as ordered  - Instruct and encourage patient and family to use good hand hygiene technique  - Identify and instruct in appropriate isolation precautions for identified infection/condition  Outcome: Progressing     Problem: SAFETY ADULT  Goal: Patient will remain free of falls  Description: INTERVENTIONS:  - Educate patient/family on patient safety including physical limitations  - Instruct patient to call for assistance with activity   - Consider consulting OT/PT to assist with strengthening/mobility based on AM PAC & JH-HLM score  - Consult OT/PT to assist with strengthening/mobility   - Keep Call bell within reach  - Keep bed low and locked with side rails adjusted as appropriate  - Keep  care items and personal belongings within reach  - Initiate and maintain comfort rounds  - Make Fall Risk Sign visible to staff  - Offer Toileting in advance of need  - Initiate/Maintain bed alarm  - Obtain necessary fall risk management equipment:   - Apply yellow socks and bracelet for high fall risk patients  - Consider moving patient to room near nurses station  Outcome: Progressing     Problem: DISCHARGE PLANNING  Goal: Discharge to home or other facility with appropriate resources  Description: INTERVENTIONS:  - Identify barriers to discharge w/patient and caregiver  - Arrange for needed discharge resources and transportation as appropriate  - Identify discharge learning needs (meds, wound care, etc.)  - Refer to Case Management Department for coordinating discharge planning if the patient needs post-hospital services based on physician/advanced practitioner order or complex needs related to functional status, cognitive ability, or social support system  Outcome: Progressing     Problem: Knowledge Deficit  Goal: Patient/family/caregiver demonstrates understanding of disease process, treatment plan, medications, and discharge instructions  Description: Complete learning assessment and assess knowledge base.  Interventions:  - Provide teaching at level of understanding  - Provide teaching via preferred learning methods  Outcome: Progressing     Problem: GASTROINTESTINAL - ADULT  Goal: Minimal or absence of nausea and/or vomiting  Description: INTERVENTIONS:  - Administer IV fluids if ordered to ensure adequate hydration  - Maintain NPO status until nausea and vomiting are resolved  - Nasogastric tube if ordered  - Administer ordered antiemetic medications as needed  - Provide nonpharmacologic comfort measures as appropriate  - Advance diet as tolerated, if ordered  - Consider nutrition services referral to assist patient with adequate nutrition and appropriate food choices  Outcome: Progressing  Goal: Maintains  or returns to baseline bowel function  Description: INTERVENTIONS:  - Assess bowel function  - Encourage oral fluids to ensure adequate hydration  - Administer IV fluids if ordered to ensure adequate hydration  - Administer ordered medications as needed  - Encourage mobilization and activity  - Consider nutritional services referral to assist patient with adequate nutrition and appropriate food choices  Outcome: Progressing  Goal: Maintains adequate nutritional intake  Description: INTERVENTIONS:  - Monitor percentage of each meal consumed  - Identify factors contributing to decreased intake, treat as appropriate  - Assist with meals as needed  - Monitor I&O, weight, and lab values if indicated  - Obtain nutrition services referral as needed  Outcome: Progressing

## 2025-07-19 NOTE — ASSESSMENT & PLAN NOTE
"49-year-old male with PMH of hypertension, hyperlipidemia, remote history of testicular cancer status post lymph node dissection, orchiectomy, and radiation therapy presenting with abdominal pain x 3 days.    HD#3 for conservative management of acute diverticulitis of descending colon w perforation      Afebrile, vital signs stable  Leukocytosis, resolved- WBC 7.84  Hemoglobin stable at 10.9 from 9.9  Clinically stable, abdomen soft, nondistended, no TTP      7/16 CT CAP \"Acute perforated descending colonic diverticulitis. No abscess.\"      Past surgical history significant for testicular cancer requiring lymph node dissection and orchiectomy over 10 years ago, followed by reoperation 2 years after after initial procedure to \"remove scar tissue \". History also significant for open left inguinal hernia repair.    Plan:  Continue conservative management of acute diverticulitis.   Advance to surgical soft diet, likely today if he tolerates diet change  Continue IV antibiotics, transition to po upon DC  DC IV fluids   Follow up blood cultures, no growth in 48 hours  Analgesia and antiemetics prn   DVT prophylaxis, subq heparin  Out of bed ambulation  Low fiber x 2 weeks upon discharge, then transition to maintain high-fiber diet  Over-the-counter ibuprofen/Tylenol as needed for pain control upon discharge  Outpatient follow-up as needed with general surgery  Outpatient follow-up with GI plan for colonoscopy in 6 to 8 weeks  Discussed with Dr. Busch   "

## 2025-07-20 NOTE — DISCHARGE SUMMARY
Discharge Summary - Surgery-General   Name: Jean Pierre Amos 49 y.o. male I MRN: [id number]  Unit/Bed#: -01 I Date of Admission: 7/16/2025   Date of Service: 7/20/2025 I Hospital Day: 3    Admission Date: 7/16/2025 1104  Discharge Date: 07/20/25  Admitting Diagnosis: Abdominal pain [R10.9]  Diverticulitis of large intestine with perforation without bleeding [K57.20]  Discharge Diagnosis:   Medical Problems       Resolved Problems  Date Reviewed: 7/16/2025          Resolved    Diverticulitis of large intestine with perforation 7/16/2025     Resolved by  Edwige Edmondson MD          HPI: 50 y/o M with PMH of hypertension, hyperlipidemia, remote history of testicular cancer status post lymph node dissection, orchiectomy, and radiation therapy who presented with abdominal pain x 3 days. Found to have acute diverticulitis of the descending colon with perforation into the retroperitoneum, without evidence of diffuse free air or free fluid.     Procedures Performed: None    Summary of Hospital Course: Admitted to the general surgery service for conservative non-operative management of diverticulitis with bowel rest, IVF, IV abx, prn analgesia. His diet was slowly advanced. Hospital day 4, he was tolerating a low residue diet, pain had improved, he was having bowel function, was voiding, and was OOB ambulating. He was deemed stable for discharge home with transition to oral abx. Augmentin was sent to his pharmacy, for a total 10 day course of abx. He was told to follow up with GI for a colonoscopy in 6-8 weeks. He can follow up with general surgery as needed.     Complications: None    Condition at Discharge: good       Discharge instructions/Information to patient and family:   See After Visit Summary (AVS) for information provided to patient and family.      Provisions for Follow-Up Care:  See after visit summary for information related to follow-up care and any pertinent home health orders.      PCP: No primary care  provider on file.    Disposition: Home    Planned Readmission: No     Discharge Medications:  See after visit summary for reconciled discharge medications provided to patient and family.      Discharge Statement:  I have spent a total time of 20 minutes in caring for this patient on the day of the visit/encounter. .

## 2025-07-21 LAB
BACTERIA BLD CULT: NORMAL
BACTERIA BLD CULT: NORMAL

## 2025-07-24 ENCOUNTER — TELEPHONE (OUTPATIENT)
Dept: SURGERY | Facility: CLINIC | Age: 50
End: 2025-07-24

## 2025-07-24 NOTE — TELEPHONE ENCOUNTER
Our first opening for GI in [place] which is where he requested is 10/3, however he is on the wait list so if we have a cancellation it will let him know.    GOKULI as I know you wanted 4-6 weeks out.

## 2025-07-24 NOTE — TELEPHONE ENCOUNTER
Placed call to patient and left a message to return our call.    Need to confirm hospital follow up visit with general surgery and schedule him next available new patient GI for colonoscopy coordination and diverticulitis follow up.

## 2025-08-06 ENCOUNTER — OCCMED (OUTPATIENT)
Dept: URGENT CARE | Facility: CLINIC | Age: 50
End: 2025-08-06

## 2025-08-06 DIAGNOSIS — Z00.00 ENCOUNTER FOR ANNUAL PHYSICAL EXAM: Primary | ICD-10-CM

## 2025-08-17 PROBLEM — A41.9 SEPSIS DUE TO UNDETERMINED ORGANISM (HCC): Status: RESOLVED | Noted: 2025-07-18 | Resolved: 2025-08-17

## 2025-08-22 ENCOUNTER — OFFICE VISIT (OUTPATIENT)
Dept: SURGERY | Facility: CLINIC | Age: 50
End: 2025-08-22
Payer: COMMERCIAL

## 2025-08-22 VITALS
HEART RATE: 72 BPM | WEIGHT: 297 LBS | RESPIRATION RATE: 18 BRPM | SYSTOLIC BLOOD PRESSURE: 122 MMHG | DIASTOLIC BLOOD PRESSURE: 70 MMHG | TEMPERATURE: 97.9 F | OXYGEN SATURATION: 95 % | BODY MASS INDEX: 34.36 KG/M2 | HEIGHT: 78 IN

## 2025-08-22 DIAGNOSIS — Z09 FOLLOW-UP EXAM: Primary | ICD-10-CM

## 2025-08-22 DIAGNOSIS — K57.20 DIVERTICULITIS OF COLON WITH PERFORATION: ICD-10-CM

## 2025-08-22 PROCEDURE — 99213 OFFICE O/P EST LOW 20 MIN: CPT | Performed by: SURGERY
